# Patient Record
Sex: FEMALE | Race: BLACK OR AFRICAN AMERICAN | NOT HISPANIC OR LATINO | Employment: OTHER | URBAN - METROPOLITAN AREA
[De-identification: names, ages, dates, MRNs, and addresses within clinical notes are randomized per-mention and may not be internally consistent; named-entity substitution may affect disease eponyms.]

---

## 2020-11-18 ENCOUNTER — APPOINTMENT (OUTPATIENT)
Dept: GENERAL RADIOLOGY | Facility: HOSPITAL | Age: 85
End: 2020-11-18

## 2020-11-18 ENCOUNTER — HOSPITAL ENCOUNTER (INPATIENT)
Facility: HOSPITAL | Age: 85
LOS: 6 days | Discharge: NURSING, REHAB OR LONG-TERM CARE | End: 2020-11-24
Attending: EMERGENCY MEDICINE | Admitting: INTERNAL MEDICINE

## 2020-11-18 DIAGNOSIS — G93.40 ACUTE ENCEPHALOPATHY: ICD-10-CM

## 2020-11-18 DIAGNOSIS — E11.10 DIABETIC KETOACIDOSIS WITHOUT COMA ASSOCIATED WITH TYPE 2 DIABETES MELLITUS (HCC): Primary | ICD-10-CM

## 2020-11-18 DIAGNOSIS — R09.02 HYPOXIA: ICD-10-CM

## 2020-11-18 DIAGNOSIS — N17.9 ACUTE KIDNEY INJURY (HCC): ICD-10-CM

## 2020-11-18 DIAGNOSIS — U07.1 COVID-19 VIRUS INFECTION: ICD-10-CM

## 2020-11-18 DIAGNOSIS — E87.5 HYPERKALEMIA: ICD-10-CM

## 2020-11-18 LAB
ALBUMIN SERPL-MCNC: 4 G/DL (ref 3.5–5.2)
ALBUMIN/GLOB SERPL: 0.9 G/DL
ALP SERPL-CCNC: 85 U/L (ref 39–117)
ALT SERPL W P-5'-P-CCNC: 16 U/L (ref 1–33)
ANION GAP SERPL CALCULATED.3IONS-SCNC: 20.3 MMOL/L (ref 5–15)
ARTERIAL PATENCY WRIST A: POSITIVE
AST SERPL-CCNC: 15 U/L (ref 1–32)
ATMOSPHERIC PRESS: 763 MMHG
B PARAPERT DNA SPEC QL NAA+PROBE: NOT DETECTED
B PERT DNA SPEC QL NAA+PROBE: NOT DETECTED
B-OH-BUTYR SERPL-SCNC: 2.07 MMOL/L (ref 0.02–0.27)
BASE EXCESS BLDA CALC-SCNC: -8 MMOL/L (ref 0–2)
BASOPHILS # BLD AUTO: 0.02 10*3/MM3 (ref 0–0.2)
BASOPHILS NFR BLD AUTO: 0.2 % (ref 0–1.5)
BDY SITE: ABNORMAL
BILIRUB SERPL-MCNC: 0.5 MG/DL (ref 0–1.2)
BUN SERPL-MCNC: 83 MG/DL (ref 8–23)
BUN/CREAT SERPL: 50.6 (ref 7–25)
C PNEUM DNA NPH QL NAA+NON-PROBE: NOT DETECTED
CALCIUM SPEC-SCNC: 10.1 MG/DL (ref 8.6–10.5)
CHLORIDE SERPL-SCNC: 109 MMOL/L (ref 98–107)
CO2 SERPL-SCNC: 13.7 MMOL/L (ref 22–29)
CREAT SERPL-MCNC: 1.64 MG/DL (ref 0.57–1)
CRP SERPL-MCNC: 1.95 MG/DL (ref 0–0.5)
D DIMER PPP FEU-MCNC: 2.36 MCGFEU/ML (ref 0–0.49)
D-LACTATE SERPL-SCNC: 1.7 MMOL/L (ref 0.5–2)
D-LACTATE SERPL-SCNC: 2.6 MMOL/L (ref 0.5–2)
DEPRECATED RDW RBC AUTO: 45.3 FL (ref 37–54)
EOSINOPHIL # BLD AUTO: 0 10*3/MM3 (ref 0–0.4)
EOSINOPHIL NFR BLD AUTO: 0 % (ref 0.3–6.2)
ERYTHROCYTE [DISTWIDTH] IN BLOOD BY AUTOMATED COUNT: 13.4 % (ref 12.3–15.4)
FERRITIN SERPL-MCNC: 706 NG/ML (ref 13–150)
FLUAV SUBTYP SPEC NAA+PROBE: NOT DETECTED
FLUBV RNA ISLT QL NAA+PROBE: NOT DETECTED
GAS FLOW AIRWAY: 3 LPM
GFR SERPL CREATININE-BSD FRML MDRD: 36 ML/MIN/1.73
GLOBULIN UR ELPH-MCNC: 4.7 GM/DL
GLUCOSE BLDC GLUCOMTR-MCNC: 488 MG/DL (ref 70–130)
GLUCOSE BLDC GLUCOMTR-MCNC: >599 MG/DL (ref 70–130)
GLUCOSE SERPL-MCNC: 601 MG/DL (ref 65–99)
HADV DNA SPEC NAA+PROBE: NOT DETECTED
HBA1C MFR BLD: 7.4 % (ref 4.8–5.6)
HCO3 BLDA-SCNC: 14.8 MMOL/L (ref 22–28)
HCOV 229E RNA SPEC QL NAA+PROBE: NOT DETECTED
HCOV HKU1 RNA SPEC QL NAA+PROBE: NOT DETECTED
HCOV NL63 RNA SPEC QL NAA+PROBE: NOT DETECTED
HCOV OC43 RNA SPEC QL NAA+PROBE: NOT DETECTED
HCT VFR BLD AUTO: 42.1 % (ref 34–46.6)
HGB BLD-MCNC: 13.6 G/DL (ref 12–15.9)
HMPV RNA NPH QL NAA+NON-PROBE: NOT DETECTED
HPIV1 RNA SPEC QL NAA+PROBE: NOT DETECTED
HPIV2 RNA SPEC QL NAA+PROBE: NOT DETECTED
HPIV3 RNA NPH QL NAA+PROBE: NOT DETECTED
HPIV4 P GENE NPH QL NAA+PROBE: NOT DETECTED
IMM GRANULOCYTES # BLD AUTO: 0.07 10*3/MM3 (ref 0–0.05)
IMM GRANULOCYTES NFR BLD AUTO: 0.5 % (ref 0–0.5)
LACTATE HOLD SPECIMEN: NORMAL
LDH SERPL-CCNC: 217 U/L (ref 135–214)
LYMPHOCYTES # BLD AUTO: 1.18 10*3/MM3 (ref 0.7–3.1)
LYMPHOCYTES NFR BLD AUTO: 9.1 % (ref 19.6–45.3)
M PNEUMO IGG SER IA-ACNC: NOT DETECTED
MAGNESIUM SERPL-MCNC: 1.1 MG/DL (ref 1.6–2.4)
MCH RBC QN AUTO: 30.4 PG (ref 26.6–33)
MCHC RBC AUTO-ENTMCNC: 32.3 G/DL (ref 31.5–35.7)
MCV RBC AUTO: 94 FL (ref 79–97)
MODALITY: ABNORMAL
MONOCYTES # BLD AUTO: 1.39 10*3/MM3 (ref 0.1–0.9)
MONOCYTES NFR BLD AUTO: 10.7 % (ref 5–12)
NEUTROPHILS NFR BLD AUTO: 10.28 10*3/MM3 (ref 1.7–7)
NEUTROPHILS NFR BLD AUTO: 79.5 % (ref 42.7–76)
NRBC BLD AUTO-RTO: 0.1 /100 WBC (ref 0–0.2)
OSMOLALITY SERPL: 305 MOSM/KG (ref 280–301)
PCO2 BLDA: 23.7 MM HG (ref 35–45)
PH BLDA: 7.4 PH UNITS (ref 7.35–7.45)
PHOSPHATE SERPL-MCNC: 4.7 MG/DL (ref 2.5–4.5)
PLATELET # BLD AUTO: 610 10*3/MM3 (ref 140–450)
PMV BLD AUTO: 10.1 FL (ref 6–12)
PO2 BLDA: 79.7 MM HG (ref 80–100)
POTASSIUM SERPL-SCNC: 6.8 MMOL/L (ref 3.5–5.2)
PROCALCITONIN SERPL-MCNC: 0.11 NG/ML (ref 0–0.25)
PROT SERPL-MCNC: 8.7 G/DL (ref 6–8.5)
RBC # BLD AUTO: 4.48 10*6/MM3 (ref 3.77–5.28)
RHINOVIRUS RNA SPEC NAA+PROBE: NOT DETECTED
RSV RNA NPH QL NAA+NON-PROBE: NOT DETECTED
SAO2 % BLDCOA: 96.1 % (ref 92–99)
SARS-COV-2 RNA NPH QL NAA+NON-PROBE: DETECTED
SET MECH RESP RATE: 28
SODIUM SERPL-SCNC: 143 MMOL/L (ref 136–145)
TROPONIN T SERPL-MCNC: 0.17 NG/ML (ref 0–0.03)
WBC # BLD AUTO: 12.94 10*3/MM3 (ref 3.4–10.8)

## 2020-11-18 PROCEDURE — 82962 GLUCOSE BLOOD TEST: CPT

## 2020-11-18 PROCEDURE — 0202U NFCT DS 22 TRGT SARS-COV-2: CPT | Performed by: EMERGENCY MEDICINE

## 2020-11-18 PROCEDURE — 99285 EMERGENCY DEPT VISIT HI MDM: CPT

## 2020-11-18 PROCEDURE — 80053 COMPREHEN METABOLIC PANEL: CPT | Performed by: EMERGENCY MEDICINE

## 2020-11-18 PROCEDURE — 85379 FIBRIN DEGRADATION QUANT: CPT | Performed by: EMERGENCY MEDICINE

## 2020-11-18 PROCEDURE — 25010000002 ENOXAPARIN PER 10 MG: Performed by: INTERNAL MEDICINE

## 2020-11-18 PROCEDURE — 82010 KETONE BODYS QUAN: CPT | Performed by: EMERGENCY MEDICINE

## 2020-11-18 PROCEDURE — 83930 ASSAY OF BLOOD OSMOLALITY: CPT | Performed by: EMERGENCY MEDICINE

## 2020-11-18 PROCEDURE — 84484 ASSAY OF TROPONIN QUANT: CPT | Performed by: EMERGENCY MEDICINE

## 2020-11-18 PROCEDURE — 25010000002 CALCIUM GLUCONATE PER 10 ML: Performed by: EMERGENCY MEDICINE

## 2020-11-18 PROCEDURE — 85025 COMPLETE CBC W/AUTO DIFF WBC: CPT | Performed by: EMERGENCY MEDICINE

## 2020-11-18 PROCEDURE — 82803 BLOOD GASES ANY COMBINATION: CPT

## 2020-11-18 PROCEDURE — 84145 PROCALCITONIN (PCT): CPT | Performed by: EMERGENCY MEDICINE

## 2020-11-18 PROCEDURE — 71045 X-RAY EXAM CHEST 1 VIEW: CPT

## 2020-11-18 PROCEDURE — 93005 ELECTROCARDIOGRAM TRACING: CPT | Performed by: EMERGENCY MEDICINE

## 2020-11-18 PROCEDURE — 25010000002 MAGNESIUM SULFATE 2 GM/50ML SOLUTION: Performed by: INTERNAL MEDICINE

## 2020-11-18 PROCEDURE — 82728 ASSAY OF FERRITIN: CPT | Performed by: EMERGENCY MEDICINE

## 2020-11-18 PROCEDURE — 84100 ASSAY OF PHOSPHORUS: CPT | Performed by: EMERGENCY MEDICINE

## 2020-11-18 PROCEDURE — 36600 WITHDRAWAL OF ARTERIAL BLOOD: CPT

## 2020-11-18 PROCEDURE — 83735 ASSAY OF MAGNESIUM: CPT | Performed by: EMERGENCY MEDICINE

## 2020-11-18 PROCEDURE — 83605 ASSAY OF LACTIC ACID: CPT | Performed by: EMERGENCY MEDICINE

## 2020-11-18 PROCEDURE — 25010000002 CEFTRIAXONE PER 250 MG: Performed by: EMERGENCY MEDICINE

## 2020-11-18 PROCEDURE — 25010000003 INSULIN REGULAR HUMAN PER 5 UNITS: Performed by: EMERGENCY MEDICINE

## 2020-11-18 PROCEDURE — 93010 ELECTROCARDIOGRAM REPORT: CPT | Performed by: INTERNAL MEDICINE

## 2020-11-18 PROCEDURE — 86140 C-REACTIVE PROTEIN: CPT | Performed by: EMERGENCY MEDICINE

## 2020-11-18 PROCEDURE — 83615 LACTATE (LD) (LDH) ENZYME: CPT | Performed by: EMERGENCY MEDICINE

## 2020-11-18 PROCEDURE — 83036 HEMOGLOBIN GLYCOSYLATED A1C: CPT | Performed by: EMERGENCY MEDICINE

## 2020-11-18 RX ORDER — SODIUM CHLORIDE 450 MG/100ML
250 INJECTION, SOLUTION INTRAVENOUS CONTINUOUS PRN
Status: DISCONTINUED | OUTPATIENT
Start: 2020-11-18 | End: 2020-11-20

## 2020-11-18 RX ORDER — CEFTRIAXONE SODIUM 1 G/50ML
1 INJECTION, SOLUTION INTRAVENOUS ONCE
Status: COMPLETED | OUTPATIENT
Start: 2020-11-18 | End: 2020-11-18

## 2020-11-18 RX ORDER — PRAVASTATIN SODIUM 40 MG
40 TABLET ORAL DAILY
COMMUNITY
End: 2022-01-01 | Stop reason: HOSPADM

## 2020-11-18 RX ORDER — VALSARTAN AND HYDROCHLOROTHIAZIDE 320; 12.5 MG/1; MG/1
1 TABLET, FILM COATED ORAL DAILY
COMMUNITY
End: 2022-01-01 | Stop reason: HOSPADM

## 2020-11-18 RX ORDER — SODIUM CHLORIDE 0.9 % (FLUSH) 0.9 %
10 SYRINGE (ML) INJECTION AS NEEDED
Status: DISCONTINUED | OUTPATIENT
Start: 2020-11-18 | End: 2020-11-24 | Stop reason: HOSPADM

## 2020-11-18 RX ORDER — POTASSIUM CHLORIDE 750 MG/1
40 TABLET, FILM COATED, EXTENDED RELEASE ORAL AS NEEDED
Status: DISCONTINUED | OUTPATIENT
Start: 2020-11-18 | End: 2020-11-24 | Stop reason: HOSPADM

## 2020-11-18 RX ORDER — SODIUM CHLORIDE 0.9 % (FLUSH) 0.9 %
10 SYRINGE (ML) INJECTION ONCE AS NEEDED
Status: DISCONTINUED | OUTPATIENT
Start: 2020-11-18 | End: 2020-11-24 | Stop reason: HOSPADM

## 2020-11-18 RX ORDER — SODIUM CHLORIDE AND POTASSIUM CHLORIDE 300; 900 MG/100ML; MG/100ML
250 INJECTION, SOLUTION INTRAVENOUS CONTINUOUS PRN
Status: DISCONTINUED | OUTPATIENT
Start: 2020-11-18 | End: 2020-11-20

## 2020-11-18 RX ORDER — DEXTROSE AND SODIUM CHLORIDE 5; .45 G/100ML; G/100ML
150 INJECTION, SOLUTION INTRAVENOUS CONTINUOUS PRN
Status: DISCONTINUED | OUTPATIENT
Start: 2020-11-18 | End: 2020-11-20

## 2020-11-18 RX ORDER — MAGNESIUM SULFATE HEPTAHYDRATE 40 MG/ML
2 INJECTION, SOLUTION INTRAVENOUS AS NEEDED
Status: DISCONTINUED | OUTPATIENT
Start: 2020-11-18 | End: 2020-11-24 | Stop reason: HOSPADM

## 2020-11-18 RX ORDER — DEXTROSE MONOHYDRATE 25 G/50ML
50 INJECTION, SOLUTION INTRAVENOUS ONCE
Status: COMPLETED | OUTPATIENT
Start: 2020-11-18 | End: 2020-11-18

## 2020-11-18 RX ORDER — DEXTROSE, SODIUM CHLORIDE, AND POTASSIUM CHLORIDE 5; .45; .15 G/100ML; G/100ML; G/100ML
150 INJECTION INTRAVENOUS CONTINUOUS PRN
Status: DISCONTINUED | OUTPATIENT
Start: 2020-11-18 | End: 2020-11-20

## 2020-11-18 RX ORDER — GLIPIZIDE 5 MG/1
5 TABLET ORAL DAILY
COMMUNITY
End: 2020-11-24 | Stop reason: HOSPADM

## 2020-11-18 RX ORDER — GABAPENTIN 100 MG/1
100 CAPSULE ORAL NIGHTLY
COMMUNITY

## 2020-11-18 RX ORDER — DEXTROSE, SODIUM CHLORIDE, AND POTASSIUM CHLORIDE 5; .9; .15 G/100ML; G/100ML; G/100ML
150 INJECTION INTRAVENOUS CONTINUOUS PRN
Status: DISCONTINUED | OUTPATIENT
Start: 2020-11-18 | End: 2020-11-20

## 2020-11-18 RX ORDER — SPIRONOLACTONE 25 MG/1
25 TABLET ORAL DAILY
COMMUNITY
End: 2022-01-01 | Stop reason: HOSPADM

## 2020-11-18 RX ORDER — IPRATROPIUM BROMIDE AND ALBUTEROL SULFATE 2.5; .5 MG/3ML; MG/3ML
3 SOLUTION RESPIRATORY (INHALATION)
Status: DISCONTINUED | OUTPATIENT
Start: 2020-11-18 | End: 2020-11-24 | Stop reason: HOSPADM

## 2020-11-18 RX ORDER — ACETAMINOPHEN 650 MG/1
650 SUPPOSITORY RECTAL EVERY 4 HOURS PRN
Status: DISCONTINUED | OUTPATIENT
Start: 2020-11-18 | End: 2020-11-24 | Stop reason: HOSPADM

## 2020-11-18 RX ORDER — CHOLECALCIFEROL (VITAMIN D3) 125 MCG
500 CAPSULE ORAL DAILY
COMMUNITY
End: 2022-01-01 | Stop reason: HOSPADM

## 2020-11-18 RX ORDER — CLONIDINE HYDROCHLORIDE 0.1 MG/1
0.1 TABLET ORAL 2 TIMES DAILY
COMMUNITY
End: 2022-01-01 | Stop reason: HOSPADM

## 2020-11-18 RX ORDER — POTASSIUM CHLORIDE 7.45 MG/ML
10 INJECTION INTRAVENOUS
Status: DISCONTINUED | OUTPATIENT
Start: 2020-11-18 | End: 2020-11-24 | Stop reason: HOSPADM

## 2020-11-18 RX ORDER — MULTIPLE VITAMINS W/ MINERALS TAB 9MG-400MCG
1 TAB ORAL DAILY
COMMUNITY
End: 2022-01-01 | Stop reason: HOSPADM

## 2020-11-18 RX ORDER — DEXAMETHASONE 6 MG/1
6 TABLET ORAL
COMMUNITY
End: 2020-11-24 | Stop reason: HOSPADM

## 2020-11-18 RX ORDER — POTASSIUM CHLORIDE, DEXTROSE MONOHYDRATE AND SODIUM CHLORIDE 300; 5; 900 MG/100ML; G/100ML; MG/100ML
150 INJECTION, SOLUTION INTRAVENOUS CONTINUOUS PRN
Status: DISCONTINUED | OUTPATIENT
Start: 2020-11-18 | End: 2020-11-20

## 2020-11-18 RX ORDER — SODIUM CHLORIDE 0.9 % (FLUSH) 0.9 %
10 SYRINGE (ML) INJECTION EVERY 12 HOURS SCHEDULED
Status: DISCONTINUED | OUTPATIENT
Start: 2020-11-18 | End: 2020-11-24 | Stop reason: HOSPADM

## 2020-11-18 RX ORDER — SODIUM CHLORIDE AND POTASSIUM CHLORIDE 150; 900 MG/100ML; MG/100ML
250 INJECTION, SOLUTION INTRAVENOUS CONTINUOUS PRN
Status: DISCONTINUED | OUTPATIENT
Start: 2020-11-18 | End: 2020-11-20

## 2020-11-18 RX ORDER — METOPROLOL SUCCINATE 100 MG/1
100 TABLET, EXTENDED RELEASE ORAL DAILY
COMMUNITY
End: 2022-01-01 | Stop reason: HOSPADM

## 2020-11-18 RX ORDER — SODIUM CHLORIDE 0.9 % (FLUSH) 0.9 %
3 SYRINGE (ML) INJECTION EVERY 12 HOURS SCHEDULED
Status: DISCONTINUED | OUTPATIENT
Start: 2020-11-18 | End: 2020-11-24 | Stop reason: HOSPADM

## 2020-11-18 RX ORDER — AZITHROMYCIN 250 MG/1
250 TABLET, FILM COATED ORAL DAILY
COMMUNITY
End: 2022-01-01 | Stop reason: HOSPADM

## 2020-11-18 RX ORDER — MAGNESIUM SULFATE HEPTAHYDRATE 40 MG/ML
4 INJECTION, SOLUTION INTRAVENOUS AS NEEDED
Status: DISCONTINUED | OUTPATIENT
Start: 2020-11-18 | End: 2020-11-24 | Stop reason: HOSPADM

## 2020-11-18 RX ORDER — ONDANSETRON 2 MG/ML
4 INJECTION INTRAMUSCULAR; INTRAVENOUS EVERY 6 HOURS PRN
Status: DISCONTINUED | OUTPATIENT
Start: 2020-11-18 | End: 2020-11-24 | Stop reason: HOSPADM

## 2020-11-18 RX ORDER — DILTIAZEM HYDROCHLORIDE 240 MG/1
300 CAPSULE, COATED, EXTENDED RELEASE ORAL DAILY
COMMUNITY
End: 2022-01-01 | Stop reason: HOSPADM

## 2020-11-18 RX ORDER — DEXTROSE AND SODIUM CHLORIDE 5; .9 G/100ML; G/100ML
150 INJECTION, SOLUTION INTRAVENOUS CONTINUOUS PRN
Status: DISCONTINUED | OUTPATIENT
Start: 2020-11-18 | End: 2020-11-20

## 2020-11-18 RX ORDER — POTASSIUM CHLORIDE 1.5 G/1.77G
40 POWDER, FOR SOLUTION ORAL AS NEEDED
Status: DISCONTINUED | OUTPATIENT
Start: 2020-11-18 | End: 2020-11-24 | Stop reason: HOSPADM

## 2020-11-18 RX ORDER — SODIUM CHLORIDE 9 MG/ML
10 INJECTION, SOLUTION INTRAVENOUS CONTINUOUS PRN
Status: DISCONTINUED | OUTPATIENT
Start: 2020-11-18 | End: 2020-11-24 | Stop reason: HOSPADM

## 2020-11-18 RX ORDER — ACETAMINOPHEN 500 MG
500 TABLET ORAL EVERY 6 HOURS PRN
COMMUNITY

## 2020-11-18 RX ORDER — CETIRIZINE HYDROCHLORIDE 10 MG/1
10 TABLET ORAL DAILY
COMMUNITY
End: 2022-01-01 | Stop reason: HOSPADM

## 2020-11-18 RX ORDER — POLYETHYLENE GLYCOL 3350 17 G/17G
17 POWDER, FOR SOLUTION ORAL DAILY PRN
COMMUNITY
End: 2022-01-01 | Stop reason: HOSPADM

## 2020-11-18 RX ORDER — ONDANSETRON 4 MG/1
4 TABLET, FILM COATED ORAL EVERY 6 HOURS PRN
Status: DISCONTINUED | OUTPATIENT
Start: 2020-11-18 | End: 2020-11-24 | Stop reason: HOSPADM

## 2020-11-18 RX ORDER — HYDROCODONE BITARTRATE AND ACETAMINOPHEN 5; 325 MG/1; MG/1
1 TABLET ORAL EVERY 12 HOURS PRN
COMMUNITY
End: 2020-11-24 | Stop reason: HOSPADM

## 2020-11-18 RX ORDER — ASPIRIN 81 MG/1
81 TABLET ORAL DAILY
COMMUNITY

## 2020-11-18 RX ORDER — DEXTROSE, SODIUM CHLORIDE, AND POTASSIUM CHLORIDE 5; .45; .3 G/100ML; G/100ML; G/100ML
150 INJECTION INTRAVENOUS CONTINUOUS PRN
Status: DISCONTINUED | OUTPATIENT
Start: 2020-11-18 | End: 2020-11-20

## 2020-11-18 RX ORDER — SODIUM CHLORIDE 9 MG/ML
250 INJECTION, SOLUTION INTRAVENOUS CONTINUOUS PRN
Status: DISCONTINUED | OUTPATIENT
Start: 2020-11-18 | End: 2020-11-20

## 2020-11-18 RX ORDER — ACETAMINOPHEN 325 MG/1
650 TABLET ORAL EVERY 4 HOURS PRN
Status: DISCONTINUED | OUTPATIENT
Start: 2020-11-18 | End: 2020-11-24 | Stop reason: HOSPADM

## 2020-11-18 RX ORDER — DEXTROSE MONOHYDRATE 25 G/50ML
12.5 INJECTION, SOLUTION INTRAVENOUS AS NEEDED
Status: DISCONTINUED | OUTPATIENT
Start: 2020-11-18 | End: 2020-11-24 | Stop reason: HOSPADM

## 2020-11-18 RX ORDER — LANOLIN ALCOHOL/MO/W.PET/CERES
3 CREAM (GRAM) TOPICAL NIGHTLY
COMMUNITY

## 2020-11-18 RX ORDER — SODIUM CHLORIDE AND POTASSIUM CHLORIDE 150; 450 MG/100ML; MG/100ML
250 INJECTION, SOLUTION INTRAVENOUS CONTINUOUS PRN
Status: DISCONTINUED | OUTPATIENT
Start: 2020-11-18 | End: 2020-11-20

## 2020-11-18 RX ADMIN — SODIUM CHLORIDE 2000 ML: 9 INJECTION, SOLUTION INTRAVENOUS at 23:12

## 2020-11-18 RX ADMIN — SODIUM BICARBONATE 50 MEQ: 84 INJECTION INTRAVENOUS at 20:31

## 2020-11-18 RX ADMIN — CALCIUM GLUCONATE 1 G: 98 INJECTION, SOLUTION INTRAVENOUS at 20:32

## 2020-11-18 RX ADMIN — ENOXAPARIN SODIUM 40 MG: 40 INJECTION SUBCUTANEOUS at 23:07

## 2020-11-18 RX ADMIN — SODIUM CHLORIDE 7 UNITS/HR: 9 INJECTION, SOLUTION INTRAVENOUS at 20:35

## 2020-11-18 RX ADMIN — DEXTROSE MONOHYDRATE 50 ML: 500 INJECTION PARENTERAL at 20:19

## 2020-11-18 RX ADMIN — CEFTRIAXONE SODIUM 1 G: 1 INJECTION, SOLUTION INTRAVENOUS at 19:49

## 2020-11-18 RX ADMIN — SODIUM CHLORIDE 250 ML/HR: 4.5 INJECTION, SOLUTION INTRAVENOUS at 22:26

## 2020-11-18 RX ADMIN — MAGNESIUM SULFATE HEPTAHYDRATE 2 G: 40 INJECTION, SOLUTION INTRAVENOUS at 23:55

## 2020-11-18 NOTE — ED NOTES
Nursing home report pt is Covid positive and cannot keep her SaO2 above 85%. Pt is normally disoriented, but is more confused than normal today.    Pt arrived to ER wearing a face mask.     Milli Rossi RN  11/18/20 8914

## 2020-11-18 NOTE — ED PROVIDER NOTES
EMERGENCY DEPARTMENT ENCOUNTER    Room Number:  16/16  Date of encounter:  11/18/2020  PCP: Rigoberto Law MD  Historian: EMS      HPI:  Chief Complaint: Disorientation, hypoxia  A complete HPI/ROS/PMH/PSH/SH/FH are unobtainable due to: Patient condition    Context: Gsiela Gandara is a 88 y.o. female who presents to the ED via [facility] from [facility] with reports of hypoxia and disorientation. Patient reportedly recently tested positive for Covid, however, patient unable to keep oxygen saturations above 85% despite supplemental O2. Patient with baseline disorientation but more confused than normal per facility. There is a DNR listed on her accompanying paperwork.       MEDICAL RECORD REVIEW    Accompanying paperwork from facility shows a DNR    PAST MEDICAL HISTORY  Active Ambulatory Problems     Diagnosis Date Noted   • No Active Ambulatory Problems     Resolved Ambulatory Problems     Diagnosis Date Noted   • No Resolved Ambulatory Problems     No Additional Past Medical History         PAST SURGICAL HISTORY  No past surgical history on file.      FAMILY HISTORY  No family history on file.      SOCIAL HISTORY  Social History     Socioeconomic History   • Marital status:      Spouse name: Not on file   • Number of children: Not on file   • Years of education: Not on file   • Highest education level: Not on file         ALLERGIES  Patient has no known allergies.        REVIEW OF SYSTEMS  Review of Systems     Unable to obtain further history due to patient condition      PHYSICAL EXAM    I have reviewed the triage vital signs and nursing notes.    ED Triage Vitals [11/18/20 1658]   Temp Heart Rate Resp BP SpO2   98.9 °F (37.2 °C) 85 28 113/85 (!) 85 %      Temp src Heart Rate Source Patient Position BP Location FiO2 (%)   -- -- -- -- --       Physical Exam  General: Awake, ill-appearing  HEENT: Mucous membranes dry, atraumatic, normocephalic, EOMI  Neck: Full ROM  Pulm:  Symmetric chest rise, tachypnea, no overt wheezes/rales/rhonchi with diminished breath sounds in the bases bilaterally  Cardiovascular: Regular rate and rhythm, normal S1/S2, intact distal pulses  GI: Soft, nontender, nondistended, no rebound, no guarding, bowel sounds present  MSK: Full ROM, no deformity  Skin: Warm, dry  Neuro: Awake, GCS 11, moving all extremities  Psych: Mildly agitated, uncooperative      Gown, surgical mask, protective eye goggles, and gloves used during this encounter. Patient in surgical mask.      LAB RESULTS  Recent Results (from the past 24 hour(s))   D-dimer, Quantitative    Collection Time: 11/18/20  5:38 PM    Specimen: Blood   Result Value Ref Range    D-Dimer, Quantitative 2.36 (H) 0.00 - 0.49 MCGFEU/mL   C-reactive Protein    Collection Time: 11/18/20  5:38 PM    Specimen: Blood   Result Value Ref Range    C-Reactive Protein 1.95 (H) 0.00 - 0.50 mg/dL   Lactic Acid, Plasma    Collection Time: 11/18/20  5:38 PM    Specimen: Blood   Result Value Ref Range    Lactate 2.6 (C) 0.5 - 2.0 mmol/L   CBC Auto Differential    Collection Time: 11/18/20  5:38 PM    Specimen: Blood   Result Value Ref Range    WBC 12.94 (H) 3.40 - 10.80 10*3/mm3    RBC 4.48 3.77 - 5.28 10*6/mm3    Hemoglobin 13.6 12.0 - 15.9 g/dL    Hematocrit 42.1 34.0 - 46.6 %    MCV 94.0 79.0 - 97.0 fL    MCH 30.4 26.6 - 33.0 pg    MCHC 32.3 31.5 - 35.7 g/dL    RDW 13.4 12.3 - 15.4 %    RDW-SD 45.3 37.0 - 54.0 fl    MPV 10.1 6.0 - 12.0 fL    Platelets 610 (H) 140 - 450 10*3/mm3    Neutrophil % 79.5 (H) 42.7 - 76.0 %    Lymphocyte % 9.1 (L) 19.6 - 45.3 %    Monocyte % 10.7 5.0 - 12.0 %    Eosinophil % 0.0 (L) 0.3 - 6.2 %    Basophil % 0.2 0.0 - 1.5 %    Immature Grans % 0.5 0.0 - 0.5 %    Neutrophils, Absolute 10.28 (H) 1.70 - 7.00 10*3/mm3    Lymphocytes, Absolute 1.18 0.70 - 3.10 10*3/mm3    Monocytes, Absolute 1.39 (H) 0.10 - 0.90 10*3/mm3    Eosinophils, Absolute 0.00 0.00 - 0.40 10*3/mm3    Basophils, Absolute 0.02 0.00 -  0.20 10*3/mm3    Immature Grans, Absolute 0.07 (H) 0.00 - 0.05 10*3/mm3    nRBC 0.1 0.0 - 0.2 /100 WBC   Lactic Acid, Reflex Timer (This will reflex a repeat order 3-3:15 hours after ordered.)    Collection Time: 11/18/20  5:38 PM    Specimen: Blood   Result Value Ref Range    Hold Tube Hold for add-ons.    Hemoglobin A1c    Collection Time: 11/18/20  5:38 PM    Specimen: Blood   Result Value Ref Range    Hemoglobin A1C 7.40 (H) 4.80 - 5.60 %   ECG 12 Lead    Collection Time: 11/18/20  5:42 PM   Result Value Ref Range    QT Interval 345 ms   Respiratory Panel PCR w/COVID-19(SARS-CoV-2) [facility] In-House, NP Swab in UTM/VTM, 3-4 HR TAT - Swab, Nasopharynx    Collection Time: 11/18/20  5:43 PM    Specimen: Nasopharynx; Swab   Result Value Ref Range    ADENOVIRUS, PCR Not Detected Not Detected    Coronavirus 229E Not Detected Not Detected    Coronavirus HKU1 Not Detected Not Detected    Coronavirus NL63 Not Detected Not Detected    Coronavirus OC43 Not Detected Not Detected    COVID19 Detected (C) Not Detected - Ref. Range    Human Metapneumovirus Not Detected Not Detected    Human Rhinovirus/Enterovirus Not Detected Not Detected    Influenza A PCR Not Detected Not Detected    Influenza B PCR Not Detected Not Detected    Parainfluenza Virus 1 Not Detected Not Detected    Parainfluenza Virus 2 Not Detected Not Detected    Parainfluenza Virus 3 Not Detected Not Detected    Parainfluenza Virus 4 Not Detected Not Detected    RSV, PCR Not Detected Not Detected    Bordetella pertussis pcr Not Detected Not Detected    Bordetella parapertussis PCR Not Detected Not Detected    Chlamydophila pneumoniae PCR Not Detected Not Detected    Mycoplasma pneumo by PCR Not Detected Not Detected   Blood Gas, Arterial -    Collection Time: 11/18/20  6:13 PM    Specimen: Arterial Blood   Result Value Ref Range    Site Arterial: left radial     Rigo's Test Positive     pH, Arterial 7.403 7.350 - 7.450 pH units    pCO2,  Arterial 23.7 (L) 35.0 - 45.0 mm Hg    pO2, Arterial 79.7 (L) 80.0 - 100.0 mm Hg    HCO3, Arterial 14.8 (L) 22.0 - 28.0 mmol/L    Base Excess, Arterial -8.0 (L) 0.0 - 2.0 mmol/L    O2 Saturation Calculated 96.1 92.0 - 99.0 %    Barometric Pressure for Blood Gas 763.0 mmHg    Modality Cannula     Flow Rate 3 lpm    Set Mech Resp Rate 28    Comprehensive Metabolic Panel    Collection Time: 11/18/20  6:44 PM    Specimen: Blood   Result Value Ref Range    Glucose 601 (C) 65 - 99 mg/dL    BUN 83 (H) 8 - 23 mg/dL    Creatinine 1.64 (H) 0.57 - 1.00 mg/dL    Sodium 143 136 - 145 mmol/L    Potassium 6.8 (C) 3.5 - 5.2 mmol/L    Chloride 109 (H) 98 - 107 mmol/L    CO2 13.7 (L) 22.0 - 29.0 mmol/L    Calcium 10.1 8.6 - 10.5 mg/dL    Total Protein 8.7 (H) 6.0 - 8.5 g/dL    Albumin 4.00 3.50 - 5.20 g/dL    ALT (SGPT) 16 1 - 33 U/L    AST (SGOT) 15 1 - 32 U/L    Alkaline Phosphatase 85 39 - 117 U/L    Total Bilirubin 0.5 0.0 - 1.2 mg/dL    eGFR  African Amer 36 (L) >60 mL/min/1.73    Globulin 4.7 gm/dL    A/G Ratio 0.9 g/dL    BUN/Creatinine Ratio 50.6 (H) 7.0 - 25.0    Anion Gap 20.3 (H) 5.0 - 15.0 mmol/L   Lactate Dehydrogenase    Collection Time: 11/18/20  6:44 PM    Specimen: Blood   Result Value Ref Range     (H) 135 - 214 U/L   Procalcitonin    Collection Time: 11/18/20  6:44 PM    Specimen: Blood   Result Value Ref Range    Procalcitonin 0.11 0.00 - 0.25 ng/mL   Ferritin    Collection Time: 11/18/20  6:44 PM    Specimen: Blood   Result Value Ref Range    Ferritin 706.00 (H) 13.00 - 150.00 ng/mL   Phosphorus    Collection Time: 11/18/20  6:44 PM    Specimen: Blood   Result Value Ref Range    Phosphorus 4.7 (H) 2.5 - 4.5 mg/dL   Troponin    Collection Time: 11/18/20  6:44 PM    Specimen: Blood   Result Value Ref Range    Troponin T 0.173 (C) 0.000 - 0.030 ng/mL   Beta Hydroxybutyrate Quantitative    Collection Time: 11/18/20  6:44 PM    Specimen: Blood   Result Value Ref Range    Beta-Hydroxybutyrate Quant 2.066 (H)  0.020 - 0.270 mmol/L       Ordered the above labs and independently reviewed the results.        RADIOLOGY  Xr Chest Ap    Result Date: 11/18/2020  XR CHEST AP-  Clinical: Hypoxia, Covid evaluation 80-year-old female  FINDINGS: The patient is leaning towards the left and there appears to be some increased left basilar opacity which probably represents atelectasis. No effusion or edema or consolidation is demonstrated and the right lung is clear. Heart size within normal limits. There is atherosclerotic calcification of the aorta. The remainder of examination is unremarkable.  This report was finalized on 11/18/2020 6:55 PM by Dr. Nehemiah Boone M.D.        I ordered the above noted radiological studies. Reviewed by me.  See dictation for official radiology interpretation.      PROCEDURES    Critical Care  Performed by: Delonte Martínez MD  Authorized by: Karthik Hoffman MD     Critical care provider statement:     Critical care time (minutes):  37    Critical care time was exclusive of:  Separately billable procedures and treating other patients    Critical care was necessary to treat or prevent imminent or life-threatening deterioration of the following conditions:  CNS failure or compromise, metabolic crisis, endocrine crisis and renal failure    Critical care was time spent personally by me on the following activities:  Development of treatment plan with patient or surrogate, discussions with consultants, evaluation of patient's response to treatment, examination of patient, obtaining history from patient or surrogate, ordering and performing treatments and interventions, ordering and review of laboratory studies, ordering and review of radiographic studies, pulse oximetry, re-evaluation of patient's condition and review of old charts      EKG    EKG Time: 1742  Rhythm/Rate: Sinus rhythm with a rate of 99  Left axis deviation with nonspecific IVCD  Artifact but no evidence of any acute STEMI  No STEMI      Interpreted Contemporaneously by me, independently viewed  No prior for comparison      MEDICATIONS GIVEN IN ER    Medications   sodium chloride 0.9 % flush 3 mL (has no administration in time range)   sodium chloride 0.9 % flush 10 mL (has no administration in time range)   sodium chloride 0.9 % infusion (has no administration in time range)   sodium chloride 0.9 % with KCl 20 mEq/L infusion (has no administration in time range)   sodium chloride 0.9 % with KCl 40 mEq/L infusion (has no administration in time range)   dextrose 5 % and sodium chloride 0.9 % infusion (has no administration in time range)   dextrose 5 % and sodium chloride 0.9 % with KCl 40 mEq/L infusion (has no administration in time range)   dextrose 5 % and sodium chloride 0.9 % with KCl 20 mEq/L infusion (has no administration in time range)   sodium chloride 0.45 % infusion (has no administration in time range)   sodium chloride 0.45 % with KCl 20 mEq/L infusion (has no administration in time range)   sodium chloride 0.45 % 1,000 mL with potassium chloride 40 mEq infusion (has no administration in time range)   dextrose 5 % and sodium chloride 0.45 % infusion (has no administration in time range)   dextrose 5 % and sodium chloride 0.45 % with KCl 20 mEq/L infusion (has no administration in time range)   dextrose 5 % and sodium chloride 0.45 % with KCl 40 mEq/L infusion (has no administration in time range)   insulin regular (HumuLIN R,NovoLIN R) 100 Units in sodium chloride 0.9 % 100 mL (1 Units/mL) infusion (7 Units/hr Intravenous Restarted 11/18/20 2101)   dextrose (D50W) 25 g/ 50mL Intravenous Solution 12.5 g (has no administration in time range)   sodium chloride 0.9 % flush 10 mL (has no administration in time range)   sodium chloride 0.9 % infusion (has no administration in time range)   sodium chloride 0.9 % flush 10 mL (has no administration in time range)   sodium chloride 0.9 % flush 10 mL (has no administration in time range)    ipratropium-albuterol (DUO-NEB) nebulizer solution 3 mL (has no administration in time range)   enoxaparin (LOVENOX) syringe 40 mg (has no administration in time range)   acetaminophen (TYLENOL) tablet 650 mg (has no administration in time range)     Or   acetaminophen (TYLENOL) suppository 650 mg (has no administration in time range)   potassium chloride (K-DUR,KLOR-CON) ER tablet 40 mEq (has no administration in time range)     Or   potassium chloride (KLOR-CON) packet 40 mEq (has no administration in time range)     Or   potassium chloride 10 mEq in 100 mL IVPB (has no administration in time range)   Magnesium Sulfate 2 gram Bolus, followed by 8 gram infusion (total Mg dose 10 grams)- Mg less than or equal to 1mg/dL (has no administration in time range)     Or   Magnesium Sulfate 2 gram / 50mL Infusion (GIVE X 3 BAGS TO EQUAL 6GM TOTAL DOSE) - Mg 1.1 - 1.5 mg/dl (has no administration in time range)     Or   Magnesium Sulfate 4 gram infusion- Mg 1.6-1.9 mg/dL (has no administration in time range)   potassium phosphate 45 mmol in sodium chloride 0.9 % 500 mL infusion (has no administration in time range)     Or   potassium phosphate 30 mmol in sodium chloride 0.9 % 250 mL infusion (has no administration in time range)     Or   potassium phosphate 15 mmol in sodium chloride 0.9 % 100 mL infusion (has no administration in time range)     Or   sodium phosphates 40 mmol in sodium chloride 0.9 % 500 mL IVPB (has no administration in time range)     Or   sodium phosphates 20 mmol in sodium chloride 0.9 % 250 mL IVPB (has no administration in time range)   calcium gluconate 1 g/100 mL (10 mg/mL) NS IVPB (VTB) (has no administration in time range)     And   calcium gluconate 6 g in sodium chloride 0.9 % 500 mL IVPB (has no administration in time range)   ondansetron (ZOFRAN) tablet 4 mg (has no administration in time range)     Or   ondansetron (ZOFRAN) injection 4 mg (has no administration in time range)   cefTRIAXone  (ROCEPHIN) IVPB 1 g (0 g Intravenous Stopped 11/18/20 2031)   dextrose (D50W) 25 g/ 50mL Intravenous Solution 50 mL (50 mL Intravenous Given 11/18/20 2019)   sodium bicarbonate injection 8.4% 50 mEq (50 mEq Intravenous Given 11/18/20 2031)   calcium gluconate 1 g/100 mL (10 mg/mL) NS IVPB (VTB) (0 g Intravenous Stopped 11/18/20 2138)         PROGRESS, DATA ANALYSIS, CONSULTS, AND MEDICAL DECISION MAKING    All labs have been independently reviewed by me.  All radiology studies have been reviewed by me and discussed with radiologist dictating the report.   EKG's independently viewed and interpreted by me.  Discussion below represents my analysis of pertinent findings related to patient's condition, differential diagnosis, treatment plan and final disposition.    Initial concerns for decreasing mental status and hypoxia due to recent Covid diagnosis, however, will also evaluate for any other sources of renal failure, UTI, electrolyte abnormalities, cardiac issues, among others.    Troponin came back elevated today, defect this is related to acute kidney injury and critical illness, do not feel this represents NSTEMI.    ED Course as of Nov 18 2209   Wed Nov 18, 2020   1715 Nurse was able to confirm with facility that she is a DNR/DNI, recently started on dexamethasone. Conversational at baseline with some mild dementia. Facility reported left lower lobe infiltrate on chest x-ray on the 14th.    [DC]   1942 I spoke with the patient's daughter, she has been updated on her findings today and evidence of DKA in addition to Covid.  She does want aggressive measures towards the DKA, she does understand that this will land her in the ICU tonight.  The DKA order set has been initiated with treatments for the hyperkalemia as well.  She will remain a DNR/DNI.    [DC]   1947 Will start with initial 1L bolus of IV fluids, COVID 19 with hypoxia complicating factors for fluids, may need to limit/slow down fluid administration to  prevent worsening respiratory status.    [DC]   1948 Discussed with Dr. Karthik Hoffman, ICU, discussed patient's clinical course and findings today, treatment modalities, and need for ICU stay.    [DC]      ED Course User Index  [DC] Delonte Martínez MD       AS OF 22:09 EST VITALS:    BP - 146/91  HR - 100  TEMP - 98.6 °F (37 °C) (Tympanic)  02 SATS - 99%        DIAGNOSIS  Final diagnoses:   Diabetic ketoacidosis without coma associated with type 2 diabetes mellitus (CMS/HCC)   Hyperkalemia   Acute kidney injury (CMS/HCC)   Acute encephalopathy   COVID-19 virus infection   Hypoxia         DISPOSITION  HOSPITALIZATION    Discussed treatment plan and reason for hospitalization with pt/family and hospitalizing physician.  Pt/family voiced understanding of the plan for hospitalization for further testing/treatment as needed.                  Delonte Martínez MD  11/18/20 5625

## 2020-11-18 NOTE — ED NOTES
Cathryn Hussein, contacted for redraw of hemolyzed specimen.     Zachery Roberts RN  11/18/20 3451

## 2020-11-19 LAB
ALBUMIN SERPL-MCNC: 3.2 G/DL (ref 3.5–5.2)
ALBUMIN/GLOB SERPL: 0.9 G/DL
ALP SERPL-CCNC: 65 U/L (ref 39–117)
ALT SERPL W P-5'-P-CCNC: 13 U/L (ref 1–33)
ANION GAP SERPL CALCULATED.3IONS-SCNC: 12.3 MMOL/L (ref 5–15)
ANION GAP SERPL CALCULATED.3IONS-SCNC: 14.9 MMOL/L (ref 5–15)
ANION GAP SERPL CALCULATED.3IONS-SCNC: 15.6 MMOL/L (ref 5–15)
ANION GAP SERPL CALCULATED.3IONS-SCNC: 16.1 MMOL/L (ref 5–15)
ANION GAP SERPL CALCULATED.3IONS-SCNC: 16.5 MMOL/L (ref 5–15)
AST SERPL-CCNC: 16 U/L (ref 1–32)
BASOPHILS # BLD AUTO: 0.02 10*3/MM3 (ref 0–0.2)
BASOPHILS NFR BLD AUTO: 0.1 % (ref 0–1.5)
BILIRUB SERPL-MCNC: 0.4 MG/DL (ref 0–1.2)
BUN SERPL-MCNC: 56 MG/DL (ref 8–23)
BUN SERPL-MCNC: 60 MG/DL (ref 8–23)
BUN SERPL-MCNC: 66 MG/DL (ref 8–23)
BUN SERPL-MCNC: 68 MG/DL (ref 8–23)
BUN SERPL-MCNC: 70 MG/DL (ref 8–23)
BUN/CREAT SERPL: 47.1 (ref 7–25)
BUN/CREAT SERPL: 49.3 (ref 7–25)
BUN/CREAT SERPL: 50.4 (ref 7–25)
BUN/CREAT SERPL: 50.7 (ref 7–25)
BUN/CREAT SERPL: 51.5 (ref 7–25)
CALCIUM SPEC-SCNC: 7.6 MG/DL (ref 8.6–10.5)
CALCIUM SPEC-SCNC: 8.7 MG/DL (ref 8.6–10.5)
CALCIUM SPEC-SCNC: 9.2 MG/DL (ref 8.6–10.5)
CALCIUM SPEC-SCNC: 9.4 MG/DL (ref 8.6–10.5)
CALCIUM SPEC-SCNC: 9.4 MG/DL (ref 8.6–10.5)
CHLORIDE SERPL-SCNC: 118 MMOL/L (ref 98–107)
CHLORIDE SERPL-SCNC: 118 MMOL/L (ref 98–107)
CHLORIDE SERPL-SCNC: 121 MMOL/L (ref 98–107)
CHLORIDE SERPL-SCNC: 122 MMOL/L (ref 98–107)
CHLORIDE SERPL-SCNC: 122 MMOL/L (ref 98–107)
CO2 SERPL-SCNC: 12.9 MMOL/L (ref 22–29)
CO2 SERPL-SCNC: 15.1 MMOL/L (ref 22–29)
CO2 SERPL-SCNC: 15.4 MMOL/L (ref 22–29)
CO2 SERPL-SCNC: 15.7 MMOL/L (ref 22–29)
CO2 SERPL-SCNC: 16.5 MMOL/L (ref 22–29)
CREAT SERPL-MCNC: 1.19 MG/DL (ref 0.57–1)
CREAT SERPL-MCNC: 1.19 MG/DL (ref 0.57–1)
CREAT SERPL-MCNC: 1.34 MG/DL (ref 0.57–1)
CREAT SERPL-MCNC: 1.34 MG/DL (ref 0.57–1)
CREAT SERPL-MCNC: 1.36 MG/DL (ref 0.57–1)
DEPRECATED RDW RBC AUTO: 42.3 FL (ref 37–54)
EOSINOPHIL # BLD AUTO: 0 10*3/MM3 (ref 0–0.4)
EOSINOPHIL NFR BLD AUTO: 0 % (ref 0.3–6.2)
ERYTHROCYTE [DISTWIDTH] IN BLOOD BY AUTOMATED COUNT: 12.9 % (ref 12.3–15.4)
GFR SERPL CREATININE-BSD FRML MDRD: 44 ML/MIN/1.73
GFR SERPL CREATININE-BSD FRML MDRD: 45 ML/MIN/1.73
GFR SERPL CREATININE-BSD FRML MDRD: 45 ML/MIN/1.73
GFR SERPL CREATININE-BSD FRML MDRD: 52 ML/MIN/1.73
GFR SERPL CREATININE-BSD FRML MDRD: 52 ML/MIN/1.73
GLOBULIN UR ELPH-MCNC: 3.6 GM/DL
GLUCOSE BLDC GLUCOMTR-MCNC: 125 MG/DL (ref 70–130)
GLUCOSE BLDC GLUCOMTR-MCNC: 136 MG/DL (ref 70–130)
GLUCOSE BLDC GLUCOMTR-MCNC: 139 MG/DL (ref 70–130)
GLUCOSE BLDC GLUCOMTR-MCNC: 141 MG/DL (ref 70–130)
GLUCOSE BLDC GLUCOMTR-MCNC: 145 MG/DL (ref 70–130)
GLUCOSE BLDC GLUCOMTR-MCNC: 158 MG/DL (ref 70–130)
GLUCOSE BLDC GLUCOMTR-MCNC: 203 MG/DL (ref 70–130)
GLUCOSE BLDC GLUCOMTR-MCNC: 211 MG/DL (ref 70–130)
GLUCOSE BLDC GLUCOMTR-MCNC: 216 MG/DL (ref 70–130)
GLUCOSE BLDC GLUCOMTR-MCNC: 232 MG/DL (ref 70–130)
GLUCOSE BLDC GLUCOMTR-MCNC: 242 MG/DL (ref 70–130)
GLUCOSE BLDC GLUCOMTR-MCNC: 261 MG/DL (ref 70–130)
GLUCOSE BLDC GLUCOMTR-MCNC: 274 MG/DL (ref 70–130)
GLUCOSE BLDC GLUCOMTR-MCNC: 283 MG/DL (ref 70–130)
GLUCOSE BLDC GLUCOMTR-MCNC: 291 MG/DL (ref 70–130)
GLUCOSE BLDC GLUCOMTR-MCNC: 308 MG/DL (ref 70–130)
GLUCOSE BLDC GLUCOMTR-MCNC: 330 MG/DL (ref 70–130)
GLUCOSE BLDC GLUCOMTR-MCNC: 354 MG/DL (ref 70–130)
GLUCOSE BLDC GLUCOMTR-MCNC: 364 MG/DL (ref 70–130)
GLUCOSE SERPL-MCNC: 153 MG/DL (ref 65–99)
GLUCOSE SERPL-MCNC: 227 MG/DL (ref 65–99)
GLUCOSE SERPL-MCNC: 241 MG/DL (ref 65–99)
GLUCOSE SERPL-MCNC: 326 MG/DL (ref 65–99)
GLUCOSE SERPL-MCNC: 369 MG/DL (ref 65–99)
HCT VFR BLD AUTO: 37 % (ref 34–46.6)
HGB BLD-MCNC: 12 G/DL (ref 12–15.9)
IMM GRANULOCYTES # BLD AUTO: 0.14 10*3/MM3 (ref 0–0.05)
IMM GRANULOCYTES NFR BLD AUTO: 0.9 % (ref 0–0.5)
LYMPHOCYTES # BLD AUTO: 1.11 10*3/MM3 (ref 0.7–3.1)
LYMPHOCYTES NFR BLD AUTO: 7.5 % (ref 19.6–45.3)
MAGNESIUM SERPL-MCNC: 3.9 MG/DL (ref 1.6–2.4)
MAGNESIUM SERPL-MCNC: 4.1 MG/DL (ref 1.6–2.4)
MAGNESIUM SERPL-MCNC: 4.5 MG/DL (ref 1.6–2.4)
MAGNESIUM SERPL-MCNC: 4.6 MG/DL (ref 1.6–2.4)
MAGNESIUM SERPL-MCNC: 4.8 MG/DL (ref 1.6–2.4)
MCH RBC QN AUTO: 29.3 PG (ref 26.6–33)
MCHC RBC AUTO-ENTMCNC: 32.4 G/DL (ref 31.5–35.7)
MCV RBC AUTO: 90.5 FL (ref 79–97)
MONOCYTES # BLD AUTO: 1.63 10*3/MM3 (ref 0.1–0.9)
MONOCYTES NFR BLD AUTO: 11 % (ref 5–12)
NEUTROPHILS NFR BLD AUTO: 11.87 10*3/MM3 (ref 1.7–7)
NEUTROPHILS NFR BLD AUTO: 80.5 % (ref 42.7–76)
NRBC BLD AUTO-RTO: 0 /100 WBC (ref 0–0.2)
PHOSPHATE SERPL-MCNC: 2.3 MG/DL (ref 2.5–4.5)
PHOSPHATE SERPL-MCNC: 2.3 MG/DL (ref 2.5–4.5)
PHOSPHATE SERPL-MCNC: 2.7 MG/DL (ref 2.5–4.5)
PHOSPHATE SERPL-MCNC: 3 MG/DL (ref 2.5–4.5)
PHOSPHATE SERPL-MCNC: 3.6 MG/DL (ref 2.5–4.5)
PLATELET # BLD AUTO: 576 10*3/MM3 (ref 140–450)
PMV BLD AUTO: 9.8 FL (ref 6–12)
POTASSIUM SERPL-SCNC: 3.8 MMOL/L (ref 3.5–5.2)
POTASSIUM SERPL-SCNC: 4.9 MMOL/L (ref 3.5–5.2)
POTASSIUM SERPL-SCNC: 5 MMOL/L (ref 3.5–5.2)
POTASSIUM SERPL-SCNC: 5.1 MMOL/L (ref 3.5–5.2)
POTASSIUM SERPL-SCNC: 5.3 MMOL/L (ref 3.5–5.2)
PROT SERPL-MCNC: 6.8 G/DL (ref 6–8.5)
QT INTERVAL: 345 MS
RBC # BLD AUTO: 4.09 10*6/MM3 (ref 3.77–5.28)
SODIUM SERPL-SCNC: 148 MMOL/L (ref 136–145)
SODIUM SERPL-SCNC: 150 MMOL/L (ref 136–145)
SODIUM SERPL-SCNC: 150 MMOL/L (ref 136–145)
SODIUM SERPL-SCNC: 151 MMOL/L (ref 136–145)
SODIUM SERPL-SCNC: 153 MMOL/L (ref 136–145)
WBC # BLD AUTO: 14.77 10*3/MM3 (ref 3.4–10.8)

## 2020-11-19 PROCEDURE — 82962 GLUCOSE BLOOD TEST: CPT

## 2020-11-19 PROCEDURE — 25010000003 INSULIN REGULAR HUMAN PER 5 UNITS: Performed by: EMERGENCY MEDICINE

## 2020-11-19 PROCEDURE — 84100 ASSAY OF PHOSPHORUS: CPT | Performed by: EMERGENCY MEDICINE

## 2020-11-19 PROCEDURE — 25010000003 POTASSIUM CHLORIDE PER 2 MEQ: Performed by: EMERGENCY MEDICINE

## 2020-11-19 PROCEDURE — 25010000002 MAGNESIUM SULFATE 2 GM/50ML SOLUTION: Performed by: INTERNAL MEDICINE

## 2020-11-19 PROCEDURE — 83735 ASSAY OF MAGNESIUM: CPT | Performed by: EMERGENCY MEDICINE

## 2020-11-19 PROCEDURE — 36415 COLL VENOUS BLD VENIPUNCTURE: CPT | Performed by: EMERGENCY MEDICINE

## 2020-11-19 PROCEDURE — 80053 COMPREHEN METABOLIC PANEL: CPT | Performed by: INTERNAL MEDICINE

## 2020-11-19 PROCEDURE — 83735 ASSAY OF MAGNESIUM: CPT | Performed by: INTERNAL MEDICINE

## 2020-11-19 PROCEDURE — 85025 COMPLETE CBC W/AUTO DIFF WBC: CPT | Performed by: INTERNAL MEDICINE

## 2020-11-19 PROCEDURE — 25010000002 ENOXAPARIN PER 10 MG: Performed by: INTERNAL MEDICINE

## 2020-11-19 RX ORDER — METOPROLOL TARTRATE 50 MG/1
50 TABLET, FILM COATED ORAL EVERY 12 HOURS SCHEDULED
Status: DISCONTINUED | OUTPATIENT
Start: 2020-11-19 | End: 2020-11-24 | Stop reason: HOSPADM

## 2020-11-19 RX ORDER — CLONIDINE HYDROCHLORIDE 0.1 MG/1
0.1 TABLET ORAL EVERY 12 HOURS SCHEDULED
Status: DISCONTINUED | OUTPATIENT
Start: 2020-11-19 | End: 2020-11-24 | Stop reason: HOSPADM

## 2020-11-19 RX ADMIN — SODIUM CHLORIDE, PRESERVATIVE FREE 10 ML: 5 INJECTION INTRAVENOUS at 08:06

## 2020-11-19 RX ADMIN — SODIUM PHOSPHATE, MONOBASIC, MONOHYDRATE 20 MMOL: 276; 142 INJECTION, SOLUTION INTRAVENOUS at 09:26

## 2020-11-19 RX ADMIN — POTASSIUM CHLORIDE, DEXTROSE MONOHYDRATE AND SODIUM CHLORIDE 150 ML/HR: 150; 5; 450 INJECTION, SOLUTION INTRAVENOUS at 22:49

## 2020-11-19 RX ADMIN — POTASSIUM CHLORIDE AND SODIUM CHLORIDE 250 ML/HR: 450; 150 INJECTION, SOLUTION INTRAVENOUS at 21:46

## 2020-11-19 RX ADMIN — SODIUM CHLORIDE, PRESERVATIVE FREE 3 ML: 5 INJECTION INTRAVENOUS at 08:07

## 2020-11-19 RX ADMIN — POTASSIUM CHLORIDE, DEXTROSE MONOHYDRATE AND SODIUM CHLORIDE 150 ML/HR: 150; 5; 450 INJECTION, SOLUTION INTRAVENOUS at 05:26

## 2020-11-19 RX ADMIN — SODIUM CHLORIDE 1 UNITS/HR: 9 INJECTION, SOLUTION INTRAVENOUS at 11:06

## 2020-11-19 RX ADMIN — DEXTROSE AND SODIUM CHLORIDE 150 ML/HR: 5; 450 INJECTION, SOLUTION INTRAVENOUS at 15:14

## 2020-11-19 RX ADMIN — SODIUM CHLORIDE, PRESERVATIVE FREE 3 ML: 5 INJECTION INTRAVENOUS at 20:06

## 2020-11-19 RX ADMIN — POTASSIUM CHLORIDE AND SODIUM CHLORIDE 250 ML/HR: 450; 150 INJECTION, SOLUTION INTRAVENOUS at 02:00

## 2020-11-19 RX ADMIN — MAGNESIUM SULFATE HEPTAHYDRATE 2 G: 40 INJECTION, SOLUTION INTRAVENOUS at 04:10

## 2020-11-19 RX ADMIN — SODIUM CHLORIDE, PRESERVATIVE FREE 10 ML: 5 INJECTION INTRAVENOUS at 20:06

## 2020-11-19 RX ADMIN — MAGNESIUM SULFATE HEPTAHYDRATE 2 G: 40 INJECTION, SOLUTION INTRAVENOUS at 01:52

## 2020-11-19 RX ADMIN — ENOXAPARIN SODIUM 30 MG: 30 INJECTION SUBCUTANEOUS at 20:07

## 2020-11-19 NOTE — PROGRESS NOTES
"Adult Nutrition  Assessment/PES    Patient Name:  Gisela Gandara  YOB: 1932  MRN: [id number]  Admit Date:  11/18/2020    Assessment Date:  11/19/2020    Comments:  Nutrition screen per DKA protocol. Pt with hx of DM, diagnosed with COVID 19. Pt is confused and does not follow commands at this time. Diet Rx: NPO. Will follow for some form of nutrition as medical conditions allow.     Reason for Assessment     Row Name 11/19/20 0906          Reason for Assessment    Reason For Assessment  diagnosis/disease state     Diagnosis  -- DKA, covid 19, AKF, dementia, HTN, DM, amputation         Nutrition/Diet History     Row Name 11/19/20 0907          Nutrition/Diet History    Typical Food/Fluid Intake  confused at this time         Anthropometrics     Row Name 11/19/20 0907 11/19/20 0340       Anthropometrics    Height  157.5 cm (62.01\")  --    Weight  69.3 kg (152 lb 12.5 oz) not weighed by RD  69.3 kg (152 lb 12.5 oz)       Ideal Body Weight (IBW)    Ideal Body Weight (IBW) (kg)  50.45  --    % Ideal Body Weight  137.36  --       Body Mass Index (BMI)    BMI (kg/m2)  27.99  --    BMI Assessment  BMI 25-29.9: overweight  --       Ideal Body Weight (IBW), Amputee    Amputee Ideal Body Weight Assessment  5.9-->lower leg and foot, right 5.9%  --    Amputee Ideal Body Weight (IBW) Est (kg)  47.5  --        Labs/Tests/Procedures/Meds     Row Name 11/19/20 0908          Labs/Procedures/Meds    Lab Results Reviewed  reviewed     Lab Results Comments  na, glu, bun, cr, phos, mg, alb        Medications    Pertinent Medications Reviewed  reviewed     Pertinent Medications Comments  lovenox, insulin         Physical Findings     Row Name 11/19/20 0909          Physical Findings    Overall Physical Appearance  amputee     Skin  pressure injury;poor skin integrity/turgor bilateral sacral spine wound         Estimated/Assessed Needs     Row Name 11/19/20 0911 11/19/20 0907       Calculation Measurements    Weight Used " "For Calculations  47.5 kg (104 lb 11.5 oz)  --    Height  --  157.5 cm (62.01\")       Estimated/Assessed Needs    Additional Documentation  KCAL/KG (Group);Protein Requirements (Group);Fluid Requirements (Group)  --       KCAL/KG    KCAL/KG  30 Kcal/Kg (kcal)  --    25 Kcal/Kg (kcal)  --  --    30 Kcal/Kg (kcal)  1425  --       Protein Requirements    Weight Used For Protein Calculations  47.5 kg (104 lb 11.5 oz)  --    Est Protein Requirement Amount (gms/kg)  1.2 gm protein  --    Estimated Protein Requirements (gms/day)  57  --       Fluid Requirements    Fluid Requirements (mL/day)  1425  --    RDA Method (mL)  1425  --        Nutrition Prescription Ordered     Row Name 11/19/20 0911          Nutrition Prescription PO    Current PO Diet  NPO                 Problem/Interventions:                    Electronically signed by:  Diann Marie RD  11/19/20 09:18 EST  "

## 2020-11-19 NOTE — PLAN OF CARE
Pt remained in the CCU tonight. Pt is confused and does not follow commands or answers my questions. Pt does move all extremities. Pt is on room air and VSS stable. Pt is still in DKA on insulin drip and fluids. Will continue to monitor.

## 2020-11-19 NOTE — NURSING NOTE
[facility] dept - consult received for skin concerns to coccyx/sacrum. Patient has about 10x10 area of pink healed skin from previous wounds but there are no current open wounds. Due to previous damage skin is at risk for breakdown and use of Z guard and routine position changes appropriate treatment.

## 2020-11-19 NOTE — PROGRESS NOTES
"Dr. MARCIN Castellanos    [facility] CORONARY [facility]    11/19/2020    Patient ID:  Name:  Gisela Gandara  MRN:  [id number]  1/28/1932  88 y.o.  female            CC/Reason for visit: COVID-19 pneumonia, diabetic ketoacidosis, altered mental status    Interval hx: Patient is new to me.  She is confused, has baseline dementia.  She is receiving IV fluids and IV insulin with electrolyte replacement for her diabetic ketoacidosis.  I have reviewed the chart.  I reviewed H&P dictated by Dr. Karthik Hoffman.  Patient is requiring high FiO2 supplemental oxygen due to respiratory failure.    ROS: Unobtainable, patient has dementia    Past medical history, social history and family history: Reviewed and unchanged from admission H&P.    Vitals:  Vitals:    11/19/20 0700 11/19/20 0800 11/19/20 0859 11/19/20 0907   BP: 147/86 164/85     Pulse: 103 106     Resp:       Temp:   97.2 °F (36.2 °C)    TempSrc:       SpO2: 95% 93%     Weight:    69.3 kg (152 lb 12.5 oz)   Height:    157.5 cm (62.01\")           Body mass index is 27.94 kg/m².    Intake/Output Summary (Last 24 hours) at 11/19/2020 1031  Last data filed at 11/19/2020 0340  Gross per 24 hour   Intake 3045.09 ml   Output 200 ml   Net 2845.09 ml       Exam:  GEN:  Elderly who appears frail and acutely ill  Alert, oriented x 1.   LUNGS: Some coarse breath sounds bilaterally, no use of accessory muscles  CV:  Normal S1S2, without murmur, trace edema in the left leg.  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  ABD:  Non tender, no enlarged liver or masses  Skin: No visible rashes or palpable nodules    Scheduled meds:  enoxaparin, 30 mg, Subcutaneous, Q24H  sodium chloride, 10 mL, Intravenous, Q12H  sodium chloride, 3 mL, Intravenous, Q12H      IV meds:                      dextrose 5 % and sodium chloride 0.45 %, 150 mL/hr  dextrose 5 % and sodium chloride 0.45 % with KCl 20 mEq/L, 150 mL/hr, Last Rate: 150 mL/hr (11/19/20 0526)  dextrose 5 % and sodium chloride 0.45 " % with KCl 40 mEq/L, 150 mL/hr  dextrose 5 % and sodium chloride 0.9 %, 150 mL/hr  dextrose 5 % and sodium chloride 0.9 % with KCl 20 mEq, 150 mL/hr  dextrose 5% and sodium chloride 0.9% with KCl 40 mEq/L, 150 mL/hr  insulin, 7 Units/hr, Last Rate: 1 Units/hr (11/19/20 0930)  custom IV KCl infusion builder, 250 mL/hr  sodium chloride, 250 mL/hr, Last Rate: Stopped (11/19/20 0200)  sodium chloride 0.45 % with KCl 20 mEq, 250 mL/hr, Last Rate: Stopped (11/19/20 0526)  sodium chloride, 250 mL/hr  sodium chloride, 10 mL/hr  sodium chloride 0.9 % with KCl 20 mEq, 250 mL/hr  sodium chloride 0.9 % with KCl 40 mEq/L, 250 mL/hr        Data Review:   I reviewed the patient's medications and new clinical results.    COVID19   Date Value Ref Range Status   11/18/2020 Detected (C) Not Detected - Ref. Range Final         Lab Results   Component Value Date    CALCIUM 9.2 11/19/2020    PHOS 2.3 (L) 11/19/2020    MG 4.6 (H) 11/19/2020    MG 4.1 (H) 11/19/2020    MG 4.8 (H) 11/19/2020     Results from last 7 days   Lab Units 11/19/20  0753 11/19/20  0403 11/19/20  0058 11/18/20  1844  11/18/20  1738   SODIUM mmol/L 148* 153* 150* 143   < >  --    POTASSIUM mmol/L 5.0 4.9 3.8 6.8*   < >  --    CHLORIDE mmol/L 118* 122* 121* 109*   < >  --    CO2 mmol/L 15.1* 15.4* 12.9* 13.7*   < >  --    BUN mg/dL 70* 68* 60* 83*   < >  --    CREATININE mg/dL 1.36* 1.34* 1.19* 1.64*   < >  --    CALCIUM mg/dL 9.2 9.4 7.6* 10.1   < >  --    BILIRUBIN mg/dL  --   --  0.4 0.5  --   --    ALK PHOS U/L  --   --  65 85  --   --    ALT (SGPT) U/L  --   --  13 16  --   --    AST (SGOT) U/L  --   --  16 15  --   --    GLUCOSE mg/dL 153* 227* 369* 601*   < >  --    WBC 10*3/mm3  --  14.77*  --   --   --  12.94*   HEMOGLOBIN g/dL  --  12.0  --   --   --  13.6   PLATELETS 10*3/mm3  --  576*  --   --   --  610*   PROCALCITONIN ng/mL  --   --   --  0.11  --   --     < > = values in this interval not displayed.             Results from last 7 days   Lab Units  11/18/20  1844   TROPONIN T ng/mL 0.173*     Results from last 7 days   Lab Units 11/18/20  1813   PH, ARTERIAL pH units 7.403   PCO2, ARTERIAL mm Hg 23.7*   PO2 ART mm Hg 79.7*   FLOW RATE lpm 3   MODALITY  Cannula   O2 SATURATION CALC % 96.1       Estimated Creatinine Clearance: 26.1 mL/min (A) (by C-G formula based on SCr of 1.36 mg/dL (H)).      ASSESSMENT:   Acute metabolic encephalopathy  Baseline dementia  Diabetic ketoacidosis  Acute kidney injury  Hyperkalemia  Acute hypoxic respiratory failure due to COVID-19 pneumonia  Hypertension  Elevated troponin      PLAN:  Patient and all problems new to me during this admission.  She is critically ill, currently in diabetic ketoacidosis as well as battling acute respiratory failure due to COVID-19 infection.  We will continue with supportive measures.  We are holding off on dexamethasone and remdesivir due to diabetic ketoacidosis.  Patient has poor prognosis overall due to comorbidities and advanced age.  She does have baseline dementia as well.  I will reach out to power of healthcare  to discuss goals of care.  I would recommend palliative care and no resuscitation efforts.  Continue insulin, IV fluids and electrolyte replacement for DKA.    Total critical care time 35 minutes excluding any separately billable procedure time        Ciro Castellanos MD  11/19/2020

## 2020-11-19 NOTE — H&P
[facility]    CC: mental status changes, low oxygen    HPI:  Mrs. Gandara is a 89yo AAF with mutiple medical problems, brought from NH today for change in mental status and decreased oxygen.  Patient is unable to communicate with me at all, doesn't follow commands, doesn't tell me her name. Doesn't seem to react to me much at all. No further history is available. Work up in the ER reveals DKA and COVID +.  Patient is a DNR    PMH  DM  Amputation of the right le  PVD  HTN  Chronic pain  HLD    Social History     Socioeconomic History   • Marital status:      Spouse name: Not on file   • Number of children: Not on file   • Years of education: Not on file   • Highest education level: Not on file     No family history on file.  Meds: reviewed as per chart  ALL: NKDA  ROS: UTO    Vital Sign Min/Max for last 24 hours  Temp  Min: 98.6 °F (37 °C)  Max: 98.9 °F (37.2 °C)   BP  Min: 113/85  Max: 158/87   Pulse  Min: 85  Max: 95   Resp  Min: 19  Max: 28   SpO2  Min: 85 %  Max: 99 %   Flow (L/min)  Min: 3  Max: 5   Weight  Min: 74.8 kg (165 lb)  Max: 74.8 kg (165 lb)     GEN:   appears ill, obese,poorly responsive/interactive  HEENT: PERRL, no icterus, mmm, no jvd, trachea midline, neck supple  CHEST: CTA bilat, no wheezes, no crackles, no use of accessory muscles  CV: tachy, regaulr, no m/g/r  ABD: soft, nt, nd +bs, no hepatosplenomegaly  EXT: no c/c/e --s/p aka right  SKIN: no rashes, no xanthomas, decreased turgor, warm, dry  LYMPH: no palpable cervical or supraclavicular lymphadenopathy  NEURO: CN 2-12 intact and symmetric bilaterally --grossly limited exam given patient cooperation  PSYCH: unable to asses, poorly responsive  MSK: some kyphosis, normal muscular development    Labs: 11/18: reviewed:  Glucose 601  Bun 83  Cr 1.64  k 6.8  Bicarb 13.7  ldh 217  procal 0.11  Ferritin 706  Trop 0.173  bhydroxy 2.06  7.4/23/79  D dimer 2.36  crp 1.95  Lactate 2.6  Wbc 12.9  hgb 13.6  plts 610  CXR: 11/18:  probable bilateral faint ll infiltrates    A/P:  1. DKA -- insulin gtt, fluids, electrolyte replacement as needed  2. PEDRO LUIS -- iv fluids, support bp, avoid nephrotoxins  3. AHRF -- oxygen as needed  4. COVID 19 pneumonia -- hold off on dexamethasone until dka resolved, same for remdesivir.  5. Dementia with enecephalopathy  6. Elevated troponin  7. PVD  8. HTN -- hold bp meds for now    CC 40 mins excluding billable procedures.

## 2020-11-19 NOTE — ED NOTES
Pt in mask, nurse in full PPE. Pt repositioned for comfort and warm blanket applied. MD updating fm via phone.     Gertrudis Lindsey, RN  11/18/20 1946

## 2020-11-19 NOTE — PROGRESS NOTES
Discharge Planning Assessment  [facility]     Patient Name: Gisela Gandara  MRN: [id number]  Today's Date: 11/19/2020    Admit Date: 11/18/2020    Discharge Needs Assessment     Row Name 11/19/20 0959       Living Environment    Lives With  facility resident    Current Living Arrangements  extended care facility    Primary Care Provided by  self    Provides Primary Care For  no one    Quality of Family Relationships  unable to assess    Able to Return to Prior Arrangements  yes    Living Arrangement Comments  LTC IC level  medicaid can return has a bed hold       Transition Planning    Patient/Family Anticipates Transition to  long-term care facility    Patient/Family Anticipated Services at Transition  skilled nursing    Transportation Anticipated  family or friend will provide       Discharge Needs Assessment    Current Outpatient/Agency/Support Group  skilled nursing facility    Equipment Currently Used at Home  wheelchair    Concerns to be Addressed  discharge planning    Equipment Needed After Discharge  none    Outpatient/Agency/Support Group Needs  skilled nursing facility    Discharge Facility/Level of Care Needs  nursing facility, skilled        Discharge Plan     Row Name 11/19/20 1004       Plan    Plan  Return to [place] in LTC bed IC level    Plan Comments  CCP called out to pt daughter Pam, message left and requested return call.  Pt is from [facility].  Kofi will follow  She is in a LTC IC level bed May return  She has a bed hold.  Pt uses a wheel chair to ambulate.  She is dependent with most ADL's.  CCP willowing  Will attempt to reach daughter again to verify pt is to return to [place].        Continued Care and Services - Admitted Since 11/18/2020    Coordination has not been started for this encounter.         Demographic Summary    No documentation.       Functional Status    No documentation.       Psychosocial    No documentation.       Abuse/Neglect    No documentation.       Legal     No documentation.       Substance Abuse    No documentation.       Patient Forms    No documentation.           Ivone Reyes RN

## 2020-11-19 NOTE — PROGRESS NOTES
Clinical Pharmacy Services: Medication History    Gisela Gandara is a 88 y.o. female presenting to [facility] for   Chief Complaint   Patient presents with   • Shortness of Breath   • Cough   • Fever       She  has no past medical history on file.    Allergies as of 11/18/2020   • (No Known Allergies)       Medication information was obtained from: skilled nursing paperwork  Pharmacy and Phone Number:     Prior to Admission Medications     Prescriptions Last Dose Informant Patient Reported? Taking?    acetaminophen (TYLENOL) 500 MG tablet  Nursing Home Yes Yes    Take 500 mg by mouth Every 6 (Six) Hours As Needed for Mild Pain .    aspirin 81 MG EC tablet  Nursing Home Yes Yes    Take 81 mg by mouth Daily.    azithromycin (ZITHROMAX) 250 MG tablet  Nursing Home Yes Yes    Take 250 mg by mouth Daily.    cetirizine (zyrTEC) 10 MG tablet  Nursing Home Yes Yes    Take 10 mg by mouth Daily.    cloNIDine (CATAPRES) 0.1 MG tablet  Nursing Home Yes Yes    Take 0.1 mg by mouth 2 (Two) Times a Day.    dexamethasone (DECADRON) 6 MG tablet  Nursing Home Yes Yes    Take 6 mg by mouth Daily With Breakfast.    Diclofenac Sodium (VOLTAREN) 1 % gel gel  Nursing Home Yes Yes    Apply 4 g topically to the appropriate area as directed 2 (Two) Times a Day.    dilTIAZem CD (CARDIZEM CD) 240 MG 24 hr capsule  Nursing Home Yes Yes    Take 240 mg by mouth Daily.    gabapentin (NEURONTIN) 100 MG capsule  Nursing Home Yes Yes    Take 100 mg by mouth Daily.    glipizide (GLUCOTROL) 5 MG tablet  Nursing Home Yes Yes    Take 5 mg by mouth Daily.    guaiFENesin (ROBITUSSIN) 100 MG/5ML solution oral solution  Nursing Home Yes Yes    Take 200 mg by mouth Every 4 (Four) Hours As Needed.    HYDROcodone-acetaminophen (NORCO) 5-325 MG per tablet  Nursing Home Yes Yes    Take 1 tablet by mouth Every 12 (Twelve) Hours As Needed.    insulin detemir (LEVEMIR) 100 UNIT/ML injection  Nursing Home Yes Yes    Inject 12 Units under the skin into  the appropriate area as directed Daily.    melatonin 3 MG tablet  Nursing Home Yes Yes    Take 3 mg by mouth Every Night.    metFORMIN (GLUCOPHAGE) 500 MG tablet  Nursing Home Yes Yes    Take 1,000 mg by mouth 2 (Two) Times a Day With Meals.    metoprolol succinate XL (TOPROL-XL) 100 MG 24 hr tablet  Nursing Home Yes Yes    Take 100 mg by mouth Daily.    multivitamin with minerals (Centrum Silver Ultra Womens) tablet tablet  Nursing Home Yes Yes    Take 1 tablet by mouth Daily.    polyethylene glycol (MiraLax) 17 g packet  Nursing Home Yes Yes    Take 17 g by mouth Daily.    pravastatin (PRAVACHOL) 40 MG tablet  Nursing Home Yes Yes    Take 40 mg by mouth Daily.    spironolactone (ALDACTONE) 25 MG tablet  Nursing Home Yes Yes    Take 25 mg by mouth Daily.    valsartan-hydrochlorothiazide (DIOVAN-HCT) 320-12.5 MG per tablet  Nursing Home Yes Yes    Take 1 tablet by mouth Daily.    vitamin B-12 (CYANOCOBALAMIN) 500 MCG tablet  Nursing Home Yes Yes    Take 500 mcg by mouth Daily.            Medication notes:     This medication list is complete to the best of my knowledge as of 11/18/2020    Please call if questions.    Gabriella Nobles [facility]  Medication History Technician  [phone]    11/18/2020 19:28 EST

## 2020-11-20 LAB
ALBUMIN SERPL-MCNC: 2.9 G/DL (ref 3.5–5.2)
ANION GAP SERPL CALCULATED.3IONS-SCNC: 10.2 MMOL/L (ref 5–15)
ANION GAP SERPL CALCULATED.3IONS-SCNC: 13.4 MMOL/L (ref 5–15)
ANION GAP SERPL CALCULATED.3IONS-SCNC: 8.9 MMOL/L (ref 5–15)
BUN SERPL-MCNC: 34 MG/DL (ref 8–23)
BUN SERPL-MCNC: 48 MG/DL (ref 8–23)
BUN SERPL-MCNC: 51 MG/DL (ref 8–23)
BUN/CREAT SERPL: 41.5 (ref 7–25)
BUN/CREAT SERPL: 46.6 (ref 7–25)
BUN/CREAT SERPL: 46.8 (ref 7–25)
CALCIUM SPEC-SCNC: 7.7 MG/DL (ref 8.6–10.5)
CALCIUM SPEC-SCNC: 8.4 MG/DL (ref 8.6–10.5)
CALCIUM SPEC-SCNC: 8.8 MG/DL (ref 8.6–10.5)
CHLORIDE SERPL-SCNC: 121 MMOL/L (ref 98–107)
CHLORIDE SERPL-SCNC: 124 MMOL/L (ref 98–107)
CHLORIDE SERPL-SCNC: 125 MMOL/L (ref 98–107)
CO2 SERPL-SCNC: 15.8 MMOL/L (ref 22–29)
CO2 SERPL-SCNC: 16.6 MMOL/L (ref 22–29)
CO2 SERPL-SCNC: 18.1 MMOL/L (ref 22–29)
CREAT SERPL-MCNC: 0.82 MG/DL (ref 0.57–1)
CREAT SERPL-MCNC: 1.03 MG/DL (ref 0.57–1)
CREAT SERPL-MCNC: 1.09 MG/DL (ref 0.57–1)
GFR SERPL CREATININE-BSD FRML MDRD: 57 ML/MIN/1.73
GFR SERPL CREATININE-BSD FRML MDRD: 61 ML/MIN/1.73
GFR SERPL CREATININE-BSD FRML MDRD: 80 ML/MIN/1.73
GLUCOSE BLDC GLUCOMTR-MCNC: 103 MG/DL (ref 70–130)
GLUCOSE BLDC GLUCOMTR-MCNC: 110 MG/DL (ref 70–130)
GLUCOSE BLDC GLUCOMTR-MCNC: 111 MG/DL (ref 70–130)
GLUCOSE BLDC GLUCOMTR-MCNC: 113 MG/DL (ref 70–130)
GLUCOSE BLDC GLUCOMTR-MCNC: 115 MG/DL (ref 70–130)
GLUCOSE BLDC GLUCOMTR-MCNC: 145 MG/DL (ref 70–130)
GLUCOSE BLDC GLUCOMTR-MCNC: 158 MG/DL (ref 70–130)
GLUCOSE BLDC GLUCOMTR-MCNC: 169 MG/DL (ref 70–130)
GLUCOSE BLDC GLUCOMTR-MCNC: 199 MG/DL (ref 70–130)
GLUCOSE BLDC GLUCOMTR-MCNC: 202 MG/DL (ref 70–130)
GLUCOSE BLDC GLUCOMTR-MCNC: 91 MG/DL (ref 70–130)
GLUCOSE BLDC GLUCOMTR-MCNC: 99 MG/DL (ref 70–130)
GLUCOSE SERPL-MCNC: 117 MG/DL (ref 65–99)
GLUCOSE SERPL-MCNC: 121 MG/DL (ref 65–99)
GLUCOSE SERPL-MCNC: 191 MG/DL (ref 65–99)
MAGNESIUM SERPL-MCNC: 3.4 MG/DL (ref 1.6–2.4)
MAGNESIUM SERPL-MCNC: 3.6 MG/DL (ref 1.6–2.4)
PHOSPHATE SERPL-MCNC: 1.8 MG/DL (ref 2.5–4.5)
PHOSPHATE SERPL-MCNC: 2.8 MG/DL (ref 2.5–4.5)
PHOSPHATE SERPL-MCNC: 2.9 MG/DL (ref 2.5–4.5)
POTASSIUM SERPL-SCNC: 4.5 MMOL/L (ref 3.5–5.2)
POTASSIUM SERPL-SCNC: 4.5 MMOL/L (ref 3.5–5.2)
POTASSIUM SERPL-SCNC: 4.8 MMOL/L (ref 3.5–5.2)
SODIUM SERPL-SCNC: 147 MMOL/L (ref 136–145)
SODIUM SERPL-SCNC: 151 MMOL/L (ref 136–145)
SODIUM SERPL-SCNC: 155 MMOL/L (ref 136–145)

## 2020-11-20 PROCEDURE — 92610 EVALUATE SWALLOWING FUNCTION: CPT

## 2020-11-20 PROCEDURE — 82962 GLUCOSE BLOOD TEST: CPT

## 2020-11-20 PROCEDURE — 25010000002 ENOXAPARIN PER 10 MG: Performed by: INTERNAL MEDICINE

## 2020-11-20 PROCEDURE — 80069 RENAL FUNCTION PANEL: CPT | Performed by: INTERNAL MEDICINE

## 2020-11-20 PROCEDURE — 80048 BASIC METABOLIC PNL TOTAL CA: CPT | Performed by: INTERNAL MEDICINE

## 2020-11-20 PROCEDURE — 63710000001 INSULIN LISPRO (HUMAN) PER 5 UNITS: Performed by: INTERNAL MEDICINE

## 2020-11-20 PROCEDURE — 80048 BASIC METABOLIC PNL TOTAL CA: CPT | Performed by: EMERGENCY MEDICINE

## 2020-11-20 PROCEDURE — 83735 ASSAY OF MAGNESIUM: CPT | Performed by: INTERNAL MEDICINE

## 2020-11-20 PROCEDURE — 84100 ASSAY OF PHOSPHORUS: CPT | Performed by: EMERGENCY MEDICINE

## 2020-11-20 PROCEDURE — 63710000001 INSULIN GLARGINE PER 5 UNITS: Performed by: INTERNAL MEDICINE

## 2020-11-20 PROCEDURE — 36415 COLL VENOUS BLD VENIPUNCTURE: CPT | Performed by: INTERNAL MEDICINE

## 2020-11-20 PROCEDURE — 83735 ASSAY OF MAGNESIUM: CPT | Performed by: EMERGENCY MEDICINE

## 2020-11-20 PROCEDURE — 25010000003 INSULIN REGULAR HUMAN PER 5 UNITS: Performed by: EMERGENCY MEDICINE

## 2020-11-20 RX ORDER — NICOTINE POLACRILEX 4 MG
15 LOZENGE BUCCAL
Status: DISCONTINUED | OUTPATIENT
Start: 2020-11-20 | End: 2020-11-24 | Stop reason: HOSPADM

## 2020-11-20 RX ORDER — DEXTROSE MONOHYDRATE 25 G/50ML
25 INJECTION, SOLUTION INTRAVENOUS
Status: DISCONTINUED | OUTPATIENT
Start: 2020-11-20 | End: 2020-11-24 | Stop reason: HOSPADM

## 2020-11-20 RX ORDER — INSULIN GLARGINE 100 [IU]/ML
10 INJECTION, SOLUTION SUBCUTANEOUS ONCE
Status: COMPLETED | OUTPATIENT
Start: 2020-11-20 | End: 2020-11-20

## 2020-11-20 RX ORDER — DEXTROSE MONOHYDRATE 50 MG/ML
75 INJECTION, SOLUTION INTRAVENOUS ONCE
Status: COMPLETED | OUTPATIENT
Start: 2020-11-20 | End: 2020-11-20

## 2020-11-20 RX ORDER — INSULIN GLARGINE 100 [IU]/ML
15 INJECTION, SOLUTION SUBCUTANEOUS 2 TIMES DAILY
Status: DISCONTINUED | OUTPATIENT
Start: 2020-11-20 | End: 2020-11-21

## 2020-11-20 RX ADMIN — INSULIN LISPRO 4 UNITS: 100 INJECTION, SOLUTION INTRAVENOUS; SUBCUTANEOUS at 18:47

## 2020-11-20 RX ADMIN — CLONIDINE HYDROCHLORIDE 0.1 MG: 0.1 TABLET ORAL at 09:33

## 2020-11-20 RX ADMIN — DEXTROSE MONOHYDRATE 12.5 G: 500 INJECTION PARENTERAL at 02:01

## 2020-11-20 RX ADMIN — INSULIN GLARGINE 15 UNITS: 100 INJECTION, SOLUTION SUBCUTANEOUS at 05:35

## 2020-11-20 RX ADMIN — SODIUM PHOSPHATE, MONOBASIC, MONOHYDRATE 20 MMOL: 276; 142 INJECTION, SOLUTION INTRAVENOUS at 22:17

## 2020-11-20 RX ADMIN — INSULIN GLARGINE 10 UNITS: 100 INJECTION, SOLUTION SUBCUTANEOUS at 10:35

## 2020-11-20 RX ADMIN — INSULIN GLARGINE 15 UNITS: 100 INJECTION, SOLUTION SUBCUTANEOUS at 21:17

## 2020-11-20 RX ADMIN — INSULIN LISPRO 8 UNITS: 100 INJECTION, SOLUTION INTRAVENOUS; SUBCUTANEOUS at 12:43

## 2020-11-20 RX ADMIN — SODIUM CHLORIDE 3 UNITS/HR: 9 INJECTION, SOLUTION INTRAVENOUS at 05:18

## 2020-11-20 RX ADMIN — METOPROLOL TARTRATE 50 MG: 50 TABLET, FILM COATED ORAL at 22:19

## 2020-11-20 RX ADMIN — INSULIN LISPRO 4 UNITS: 100 INJECTION, SOLUTION INTRAVENOUS; SUBCUTANEOUS at 21:16

## 2020-11-20 RX ADMIN — DEXTROSE MONOHYDRATE 75 ML/HR: 50 INJECTION, SOLUTION INTRAVENOUS at 09:41

## 2020-11-20 RX ADMIN — POTASSIUM CHLORIDE, DEXTROSE MONOHYDRATE AND SODIUM CHLORIDE 150 ML/HR: 150; 5; 450 INJECTION, SOLUTION INTRAVENOUS at 05:18

## 2020-11-20 RX ADMIN — CLONIDINE HYDROCHLORIDE 0.1 MG: 0.1 TABLET ORAL at 22:18

## 2020-11-20 RX ADMIN — METOPROLOL TARTRATE 50 MG: 50 TABLET, FILM COATED ORAL at 09:33

## 2020-11-20 RX ADMIN — ENOXAPARIN SODIUM 40 MG: 40 INJECTION SUBCUTANEOUS at 21:16

## 2020-11-20 NOTE — PLAN OF CARE
Pt remained in the CCU tonight. DKA was resolved. VSS. Good UOP. Daughter updated over the phone. Will continue to monitor.

## 2020-11-20 NOTE — PLAN OF CARE
Problem: Adult Inpatient Plan of Care  Goal: Plan of Care Review  Flowsheets (Taken 11/20/2020 1480)  Plan of Care Reviewed With: patient  Outcome Summary:   Clinical swallow eval completed. Pt with tolerated ice chips, sips of water (cup/straw), and pureed trials with no s/s. Pt with consistent facial grimace with swallowing,  however, replied no when asked if it hurt to swallow. Pt had reduced mastication with soft solids with SLP removing partially masticated bolus. Pt was slow to elicit swallow at times, holding food in mouth. Recommend pureed diet with thin   meds in pureed as tolerated   upright for meals and 30 min after   slow rate   small bites/sips. ST to follow for diet tolerance.

## 2020-11-20 NOTE — THERAPY EVALUATION
Acute Care - Speech Language Pathology   Swallow Initial Evaluation [facility]     Patient Name: Gisela Gandara  : 1932  MRN: [id number]  Today's Date: 2020               Admit Date: 2020    Visit Dx:     ICD-10-CM ICD-9-CM   1. Diabetic ketoacidosis without coma associated with type 2 diabetes mellitus (CMS/HCC)  E11.10 250.12   2. Hyperkalemia  E87.5 276.7   3. Acute kidney injury (CMS/HCC)  N17.9 584.9   4. Acute encephalopathy  G93.40 348.30   5. COVID-19 virus infection  U07.1 079.89   6. Hypoxia  R09.02 799.02     Patient Active Problem List   Diagnosis   • Diabetic ketoacidosis without coma associated with type 2 diabetes mellitus (CMS/HCC)     Past Medical History:   Diagnosis Date   • Arthritis      History reviewed. No pertinent surgical history.     Patient was not wearing a face mask during this therapy encounter. Therapist used appropriate personal protective equipment including gown, eye protection, mask and gloves.  Mask used was N95/duckbill. Appropriate PPE was worn during the entire therapy session. Hand hygiene was completed before and after therapy session. Patient is in enhanced droplet precautions.        SWALLOW EVALUATION (last 72 hours)      SLP Adult Swallow Evaluation     Row Name 20 1400                   Rehab Evaluation    Document Type  evaluation  -AW        Subjective Information  no complaints  -AW        Patient Observations  alert;cooperative;agree to therapy  -AW        Patient/Family/Caregiver Comments/Observations  Pt positioned upright in bed.  -AW        Care Plan Review  evaluation/treatment results reviewed;patient/other agree to care plan  -AW        Patient Effort  good  -AW        Symptoms Noted During/After Treatment  none  -AW           General Information    Patient Profile Reviewed  yes  -AW        Pertinent History Of Current Problem  Pt admitted from NH with respiratory failure, Covid 19 PNA, and AMS; h/o demential.  -AW         Current Method of Nutrition  NPO  -AW        Precautions/Limitations, Vision  WFL;for purposes of eval  -AW        Precautions/Limitations, Hearing  WFL;for purposes of eval  -AW        Prior Level of Function-Communication  cognitive-linguistic impairment  -AW        Prior Level of Function-Swallowing  no diet consistency restrictions  -AW        Plans/Goals Discussed with  patient;agreed upon  -AW        Barriers to Rehab  medically complex  -AW        Patient's Goals for Discharge  patient did not state  -AW           Pain    Additional Documentation  Pain Scale: Numbers Pre/Post-Treatment (Group)  -AW           Pain Scale: Numbers Pre/Post-Treatment    Pretreatment Pain Rating  0/10 - no pain  -AW        Posttreatment Pain Rating  0/10 - no pain  -AW           Oral Motor Structure and Function    Dentition Assessment  edentulous, dentures not available  -AW        Secretion Management  WNL/WFL  -AW        Mucosal Quality  dry  -AW        Volitional Swallow  unable to elicit  -AW        Volitional Cough  unable to elicit  -AW           Oral Musculature and Cranial Nerve Assessment    Oral Motor General Assessment  generalized oral motor weakness  -AW        Oral Motor, Comment  minimal movements  -AW           General Eating/Swallowing Observations    Respiratory Support Currently in Use  room air  -AW        Eating/Swallowing Skills  fed by SLP  -AW        Positioning During Eating  upright in bed  -AW        Utensils Used  spoon;cup;straw  -AW        Consistencies Trialed  ice chips;thin liquids;pureed;soft textures  -AW        Pre SpO2 (%)  100  -AW        Post SpO2 (%)  98  -AW           Clinical Swallow Eval    Oral Prep Phase  impaired  -AW        Oral Residue  impaired  -AW        Pharyngeal Phase  suspected pharyngeal impairment  -AW        Clinical Swallow Evaluation Summary  Clinical swallow eval completed. Pt with tolerated ice chips, sips of water (cup/straw), and pureed trials with no s/s. Pt with  consistent facial grimace with swallowing,  however, replied no when asked if it hurt to swallow. Pt had reduced mastication with soft solids with SLP removing partially masticated bolus. Pt was slow to elicit swallow at times, holding food in mouth. Recommend pureed diet with thin; meds in pureed as tolerated; upright for meals and 30 min after; slow rate; small bites/sips. ST to follow for diet tolerance.   -AW           Clinical Impression    SLP Swallowing Diagnosis  oral dysphagia;suspected pharyngeal dysphagia  -AW        Functional Impact  risk of aspiration/pneumonia  -AW        Rehab Potential/Prognosis, Swallowing  good, to achieve stated therapy goals  -AW        Swallow Criteria for Skilled Therapeutic Interventions Met  demonstrates skilled criteria  -AW           Recommendations    Therapy Frequency (Swallow)  PRN  -AW        Predicted Duration Therapy Intervention (Days)  until discharge  -AW        SLP Diet Recommendation  puree;thin liquids  -AW        Recommended Precautions and Strategies  upright posture during/after eating;small bites of food and sips of liquid;check mouth frequently for oral residue/pocketing  -AW        Oral Care Recommendations  Oral Care before breakfast, after meals and PRN  -AW        SLP Rec. for Method of Medication Administration  meds whole;meds crushed;with pudding or applesauce;as tolerated  -AW        Monitor for Signs of Aspiration  yes;notify SLP if any concerns  -AW        Anticipated Discharge Disposition (SLP)  unknown  -AW           Swallow Goals (SLP)    Oral Nutrition/Hydration Goal Selection (SLP)  oral nutrition/hydration, SLP goal 1  -AW           Oral Nutrition/Hydration Goal 1 (SLP)    Oral Nutrition/Hydration Goal 1, SLP  Pt will safely tolerate the least restrictive diet with no s/s of aspiraiton.  -AW        Time Frame (Oral Nutrition/Hydration Goal 1, SLP)  by discharge  -AW          User Key  (r) = Recorded By, (t) = Taken By, (c) = Cosigned By     Initials Name Effective Dates    AW Juana Palm, MS CCC-SLP 06/08/18 -           EDUCATION  The patient has been educated in the following areas:   Dysphagia (Swallowing Impairment) Oral Care/Hydration Modified Diet Instruction.    SLP Recommendation and Plan  SLP Swallowing Diagnosis: oral dysphagia, suspected pharyngeal dysphagia  SLP Diet Recommendation: puree, thin liquids  Recommended Precautions and Strategies: upright posture during/after eating, small bites of food and sips of liquid, check mouth frequently for oral residue/pocketing  SLP Rec. for Method of Medication Administration: meds whole, meds crushed, with pudding or applesauce, as tolerated     Monitor for Signs of Aspiration: yes, notify SLP if any concerns     Swallow Criteria for Skilled Therapeutic Interventions Met: demonstrates skilled criteria  Anticipated Discharge Disposition (SLP): unknown  Rehab Potential/Prognosis, Swallowing: good, to achieve stated therapy goals  Therapy Frequency (Swallow): PRN  Predicted Duration Therapy Intervention (Days): until discharge                         Plan of Care Reviewed With: patient  Outcome Summary: Clinical swallow eval completed. Pt with tolerated ice chips, sips of water (cup/straw), and pureed trials with no s/s. Pt with consistent facial grimace with swallowing,  however, replied no when asked if it hurt to swallow. Pt had reduced mastication with soft solids with SLP removing partially masticated bolus. Pt was slow to elicit swallow at times, holding food in mouth. Recommend pureed diet with thin; meds in pureed as tolerated; upright for meals and 30 min after; slow rate; small bites/sips. ST to follow for diet tolerance.    SLP GOALS     Row Name 11/20/20 1400             Oral Nutrition/Hydration Goal 1 (SLP)    Oral Nutrition/Hydration Goal 1, SLP  Pt will safely tolerate the least restrictive diet with no s/s of aspiraiton.  -AW      Time Frame (Oral Nutrition/Hydration Goal 1, SLP)  by  discharge  -AW        User Key  (r) = Recorded By, (t) = Taken By, (c) = Cosigned By    Initials Name Provider Type    Juana Vila MS CCC-SLP Speech and Language Pathologist           SLP Outcome Measures (last 72 hours)      SLP Outcome Measures     Row Name 11/20/20 1400             SLP Outcome Measures    Outcome Measure Used?  Adult NOMS  -AW         Adult FCM Scores    FCM Chosen  Swallowing  -AW      Swallowing FCM Score  4  -AW        User Key  (r) = Recorded By, (t) = Taken By, (c) = Cosigned By    Initials Name Effective Dates    Juana Vila MS CCC-SLP 06/08/18 -            Time Calculation:   Time Calculation- SLP     Row Name 11/20/20 1500             Time Calculation- SLP    SLP Start Time  1400  -AW      SLP Received On  11/20/20  -AW        User Key  (r) = Recorded By, (t) = Taken By, (c) = Cosigned By    Initials Name Provider Type    Juana Vila MS CCC-SLP Speech and Language Pathologist          Therapy Charges for Today     Code Description Service Date Service Provider Modifiers Qty    [id number]  ST EVAL ORAL PHARYNG SWALLOW 4 11/20/2020 Juana Palm MS CCC-SLP GN 1               Juana Palm MS CCC-CARLOS  11/20/2020

## 2020-11-20 NOTE — PROGRESS NOTES
Dr. MARCIN Castellanos    [facility] CORONARY CARE    11/20/2020    Patient ID:  Name:  Gisela Gandara  MRN:  [id number]  1/28/1932  88 y.o.  female            CC/Reason for visit: COVID-19 pneumonia, diabetic ketoacidosis, altered mental status     Interval hx: Patient is slightly more communicative today.  She is still disoriented and confused but she did answer her name and date of birth appropriately.  Her sodium has increased significantly.  She has been dehydrated, nothing by mouth.     ROS: Unobtainable, patient has dementia    Vitals:  Vitals:    11/20/20 0507 11/20/20 0600 11/20/20 0700 11/20/20 0933   BP: 149/89 137/100 118/94 118/75   Pulse:  93 101 116   Resp:       Temp:       TempSrc:       SpO2:  97% 98%    Weight:       Height:               Body mass index is 29.55 kg/m².    Intake/Output Summary (Last 24 hours) at 11/20/2020 0945  Last data filed at 11/20/2020 0413  Gross per 24 hour   Intake 5821.2 ml   Output 1220 ml   Net 4601.2 ml       Exam:  GEN:  No distress  Alert, oriented only to herself, her name and her date of birth.  She is otherwise confused.  She does follow commands and gives me a thumbs up in both hands.  LUNGS: Clear breath sounds bilat, no use of accessory muscles  CV:  Normal S1S2, without murmur, trace edema in the left leg.  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    COVID19   Date Value Ref Range Status   11/18/2020 Detected  (C) Not Detected - Ref. Range Final         Lab Results   Component Value Date    CALCIUM 8.4 (L) 11/20/2020    PHOS 2.8 11/20/2020    MG 3.4 (H) 11/20/2020    MG 3.6 (H) 11/20/2020    MG 3.9 (H) 11/19/2020     Results from last 7 days   Lab Units 11/20/20  0354 11/20/20  0100 11/19/20  2004  11/19/20  0403 11/19/20  0058 11/18/20  1844  11/18/20  1738   SODIUM mmol/L 151* 155* 150*   < > 153* 150* 143   < >  --    POTASSIUM mmol/L 4.5 4.5 5.1   < > 4.9 3.8 6.8*   < >  --    CHLORIDE mmol/L 124* 125* 122*   < > 122* 121* 109*   < >  --    CO2 mmol/L 18.1* 16.6* 15.7*   < > 15.4* 12.9* 13.7*   < >  --    BUN mg/dL 48* 51* 56*   < > 68* 60* 83*   < >  --    CREATININE mg/dL 1.03* 1.09* 1.19*   < > 1.34* 1.19* 1.64*   < >  --    CALCIUM mg/dL 8.4* 8.8 8.7   < > 9.4 7.6* 10.1   < >  --    BILIRUBIN mg/dL  --   --   --   --   --  0.4 0.5  --   --    ALK PHOS U/L  --   --   --   --   --  65 85  --   --    ALT (SGPT) U/L  --   --   --   --   --  13 16  --   --    AST (SGOT) U/L  --   --   --   --   --  16 15  --   --    GLUCOSE mg/dL 117* 121* 326*   < > 227* 369* 601*   < >  --    WBC 10*3/mm3  --   --   --   --  14.77*  --   --   --  12.94*   HEMOGLOBIN g/dL  --   --   --   --  12.0  --   --   --  13.6   PLATELETS 10*3/mm3  --   --   --   --  576*  --   --   --  610*   PROCALCITONIN ng/mL  --   --   --   --   --   --  0.11  --   --     < > = values in this interval not displayed.         ASSESSMENT:   Acute metabolic encephalopathy  Baseline dementia  Diabetic ketoacidosis  Acute kidney injury  Hyperkalemia  Acute hypoxic respiratory failure due to COVID-19 pneumonia  Hypertension  Elevated troponin    PLAN:  We will start D5 water IV infusion for 1 L to treat her hypernatremia and dehydration.  She will need insulin dosing on a scheduled basis, long-acting Lantus twice daily.  We will continue checking her blood sugar every 4 hours for the next 24 hours until we better control her glycemia..  Start diet, advance orally  as tolerated  Start mobilizing the patient out of bed if possible.      Ciro Castellanos MD  11/20/2020

## 2020-11-21 LAB
GLUCOSE BLDC GLUCOMTR-MCNC: 138 MG/DL (ref 70–130)
GLUCOSE BLDC GLUCOMTR-MCNC: 141 MG/DL (ref 70–130)
GLUCOSE BLDC GLUCOMTR-MCNC: 292 MG/DL (ref 70–130)
GLUCOSE BLDC GLUCOMTR-MCNC: 372 MG/DL (ref 70–130)
GLUCOSE BLDC GLUCOMTR-MCNC: 97 MG/DL (ref 70–130)

## 2020-11-21 PROCEDURE — 82962 GLUCOSE BLOOD TEST: CPT

## 2020-11-21 PROCEDURE — 63710000001 INSULIN GLARGINE PER 5 UNITS: Performed by: INTERNAL MEDICINE

## 2020-11-21 PROCEDURE — 63710000001 INSULIN LISPRO (HUMAN) PER 5 UNITS: Performed by: INTERNAL MEDICINE

## 2020-11-21 PROCEDURE — 25010000002 ENOXAPARIN PER 10 MG: Performed by: INTERNAL MEDICINE

## 2020-11-21 RX ORDER — SENNA PLUS 8.6 MG/1
2 TABLET ORAL NIGHTLY
Status: DISCONTINUED | OUTPATIENT
Start: 2020-11-21 | End: 2020-11-24 | Stop reason: HOSPADM

## 2020-11-21 RX ORDER — DEXTROSE MONOHYDRATE 50 MG/ML
50 INJECTION, SOLUTION INTRAVENOUS CONTINUOUS
Status: DISCONTINUED | OUTPATIENT
Start: 2020-11-21 | End: 2020-11-22

## 2020-11-21 RX ORDER — BISACODYL 10 MG
10 SUPPOSITORY, RECTAL RECTAL DAILY PRN
Status: DISCONTINUED | OUTPATIENT
Start: 2020-11-21 | End: 2020-11-24 | Stop reason: HOSPADM

## 2020-11-21 RX ORDER — INSULIN GLARGINE 100 [IU]/ML
15 INJECTION, SOLUTION SUBCUTANEOUS NIGHTLY
Status: DISCONTINUED | OUTPATIENT
Start: 2020-11-21 | End: 2020-11-24 | Stop reason: HOSPADM

## 2020-11-21 RX ADMIN — SODIUM CHLORIDE, PRESERVATIVE FREE 3 ML: 5 INJECTION INTRAVENOUS at 01:21

## 2020-11-21 RX ADMIN — INSULIN GLARGINE 15 UNITS: 100 INJECTION, SOLUTION SUBCUTANEOUS at 09:39

## 2020-11-21 RX ADMIN — METOPROLOL TARTRATE 50 MG: 50 TABLET, FILM COATED ORAL at 09:27

## 2020-11-21 RX ADMIN — ENOXAPARIN SODIUM 40 MG: 40 INJECTION SUBCUTANEOUS at 22:20

## 2020-11-21 RX ADMIN — ACETAMINOPHEN 650 MG: 325 TABLET, FILM COATED ORAL at 14:27

## 2020-11-21 RX ADMIN — METOPROLOL TARTRATE 50 MG: 50 TABLET, FILM COATED ORAL at 22:19

## 2020-11-21 RX ADMIN — INSULIN LISPRO 20 UNITS: 100 INJECTION, SOLUTION INTRAVENOUS; SUBCUTANEOUS at 17:48

## 2020-11-21 RX ADMIN — SODIUM CHLORIDE, PRESERVATIVE FREE 10 ML: 5 INJECTION INTRAVENOUS at 09:28

## 2020-11-21 RX ADMIN — CLONIDINE HYDROCHLORIDE 0.1 MG: 0.1 TABLET ORAL at 09:27

## 2020-11-21 RX ADMIN — BISACODYL 10 MG: 10 SUPPOSITORY RECTAL at 17:48

## 2020-11-21 RX ADMIN — CLONIDINE HYDROCHLORIDE 0.1 MG: 0.1 TABLET ORAL at 22:19

## 2020-11-21 RX ADMIN — INSULIN LISPRO 12 UNITS: 100 INJECTION, SOLUTION INTRAVENOUS; SUBCUTANEOUS at 22:19

## 2020-11-21 RX ADMIN — SODIUM CHLORIDE, PRESERVATIVE FREE 10 ML: 5 INJECTION INTRAVENOUS at 01:21

## 2020-11-21 RX ADMIN — SENNOSIDES 2 TABLET: 8.6 TABLET, FILM COATED ORAL at 22:18

## 2020-11-21 RX ADMIN — DEXTROSE MONOHYDRATE 50 ML/HR: 50 INJECTION, SOLUTION INTRAVENOUS at 18:25

## 2020-11-21 RX ADMIN — INSULIN GLARGINE 15 UNITS: 100 INJECTION, SOLUTION SUBCUTANEOUS at 22:20

## 2020-11-21 NOTE — PLAN OF CARE
"Goal Outcome Evaluation:  Plan of Care Reviewed With: patient, family  Pt has had quiet night . Critical phosphorus level at start of shift resulted at 1.8 currently with 20mmol of phos infusing should finish around 0600. This will require am labs to be collected at 0800. Pt oriented times self and . Does follow commands. Reports they get her up at nursing home using the \"lift.\" but has not been verified with family. Sats good on room air denies pain or nausea. However, pt has witnessed bizarre po intake pattern ie tucks chin and shakes head and pockets material for long while . Required constant verbal and physical cuing to swallow. Was able to take metoprolol and clonidine with much encouragement. Remains overflow. Daughter called and was able to zoom with pt and multiple other family members for over 1 hour. Pt able to hold ipap and provide one word interactions. Dressing to coccyx clean dry and intact.          "

## 2020-11-21 NOTE — PROGRESS NOTES
[facility]  [phone]  Javier Greenwood MD    Subjective:  LOS: 3    She is mildly confused.  Awake and alert.  No distress.  Has baseline dementia.    Objective   Vital Signs past 24hrs    Temp range: Temp (24hrs), Av °F (36.7 °C), Min:96.1 °F (35.6 °C), Max:99 °F (37.2 °C)    BP range: BP: (100-214)/() 143/87  Pulse range: Heart Rate:  [] 89  Resp rate range: Resp:  [20] 20    Device (Oxygen Therapy): room air   Oxygen range:SpO2:  [95 %-100 %] 98 %      73.5 kg (162 lb 0.6 oz); Body mass index is 27.81 kg/m².    Intake/Output Summary (Last 24 hours) at 2020 1805  Last data filed at 2020 1757  Gross per 24 hour   Intake 2371 ml   Output 300 ml   Net 2071 ml       Physical Exam  HENT:      Head: Normocephalic.   Eyes:      Pupils: Pupils are equal, round, and reactive to light.   Cardiovascular:      Rate and Rhythm: Normal rate and regular rhythm.      Heart sounds: No murmur.   Pulmonary:      Breath sounds: Normal breath sounds. No wheezing or rales.   Abdominal:      General: Bowel sounds are normal.      Palpations: Abdomen is soft. There is no mass.      Tenderness: There is no abdominal tenderness.   Musculoskeletal:         General: No swelling.      Comments: Right AKA   Neurological:      Mental Status: She is alert.       Results Review:    I have reviewed the laboratory and imaging data since the last note by [facility] physician.  My annotations are noted in assessment and plan.    Medication Review:  I have reviewed the current MAR.  My annotations are noted in assessment and plan.    sodium chloride, 10 mL/hr      Plan   PCCM Problems  Acute metabolic encephalopathy  Acute hypernatremia  Diabetic ketoacidosis, treated  COVID-19 infection  Acute hypoxic respiratory failure, resolved  Hypertension  Baseline dementia  PEDRO LUIS, resolved  Dysphagia on modified diet    THESE ARE NEW MEDICAL PROBLEMS TO ME.    Plan of Treatment  Altered mental status appears better.  Patient  may be at her baseline dementia.    Still with hyponatremia.  Continue IV fluids with D5 water.    RN reports that patient is pocketing food and risk of aspiration as well as poor p.o. intake.  Dysphagia noted.  Unclear if baseline.  May complicate management of diabetes.    COVID-19 infection with no current sequela.    Needs replacement of low phosphorus.  Check again tomorrow.    Her sugars are low side, change lantus to once daily.    Needs placement.    Javier Greenwood MD  11/21/20  18:05 EST    While in the room and during my examination of the patient I wore gloves, gown, mask, eye protection and followed enhanced droplet/contact isolation protocol and precautions.  I washed my hands before and after this patient encounter.    Part of this note may be an electronic transcription/translation of spoken language to printed text using the Dragon Dictation System.

## 2020-11-22 LAB
ANION GAP SERPL CALCULATED.3IONS-SCNC: 8.7 MMOL/L (ref 5–15)
BUN SERPL-MCNC: 26 MG/DL (ref 8–23)
BUN/CREAT SERPL: 30.2 (ref 7–25)
CALCIUM SPEC-SCNC: 8.4 MG/DL (ref 8.6–10.5)
CHLORIDE SERPL-SCNC: 114 MMOL/L (ref 98–107)
CO2 SERPL-SCNC: 19.3 MMOL/L (ref 22–29)
CREAT SERPL-MCNC: 0.86 MG/DL (ref 0.57–1)
GFR SERPL CREATININE-BSD FRML MDRD: 75 ML/MIN/1.73
GLUCOSE BLDC GLUCOMTR-MCNC: 152 MG/DL (ref 70–130)
GLUCOSE BLDC GLUCOMTR-MCNC: 164 MG/DL (ref 70–130)
GLUCOSE BLDC GLUCOMTR-MCNC: 217 MG/DL (ref 70–130)
GLUCOSE BLDC GLUCOMTR-MCNC: 225 MG/DL (ref 70–130)
GLUCOSE BLDC GLUCOMTR-MCNC: 62 MG/DL (ref 70–130)
GLUCOSE BLDC GLUCOMTR-MCNC: 70 MG/DL (ref 70–130)
GLUCOSE SERPL-MCNC: 73 MG/DL (ref 65–99)
MAGNESIUM SERPL-MCNC: 2.6 MG/DL (ref 1.6–2.4)
PHOSPHATE SERPL-MCNC: 2.8 MG/DL (ref 2.5–4.5)
POTASSIUM SERPL-SCNC: 4 MMOL/L (ref 3.5–5.2)
SODIUM SERPL-SCNC: 142 MMOL/L (ref 136–145)

## 2020-11-22 PROCEDURE — 63710000001 INSULIN LISPRO (HUMAN) PER 5 UNITS: Performed by: INTERNAL MEDICINE

## 2020-11-22 PROCEDURE — 63710000001 INSULIN GLARGINE PER 5 UNITS: Performed by: INTERNAL MEDICINE

## 2020-11-22 PROCEDURE — U0004 COV-19 TEST NON-CDC HGH THRU: HCPCS | Performed by: INTERNAL MEDICINE

## 2020-11-22 PROCEDURE — 80048 BASIC METABOLIC PNL TOTAL CA: CPT | Performed by: INTERNAL MEDICINE

## 2020-11-22 PROCEDURE — 83735 ASSAY OF MAGNESIUM: CPT | Performed by: INTERNAL MEDICINE

## 2020-11-22 PROCEDURE — 84100 ASSAY OF PHOSPHORUS: CPT | Performed by: INTERNAL MEDICINE

## 2020-11-22 PROCEDURE — 82962 GLUCOSE BLOOD TEST: CPT

## 2020-11-22 PROCEDURE — 25010000002 ENOXAPARIN PER 10 MG: Performed by: INTERNAL MEDICINE

## 2020-11-22 RX ADMIN — SENNOSIDES 2 TABLET: 8.6 TABLET, FILM COATED ORAL at 21:40

## 2020-11-22 RX ADMIN — INSULIN GLARGINE 15 UNITS: 100 INJECTION, SOLUTION SUBCUTANEOUS at 21:41

## 2020-11-22 RX ADMIN — INSULIN LISPRO 8 UNITS: 100 INJECTION, SOLUTION INTRAVENOUS; SUBCUTANEOUS at 17:35

## 2020-11-22 RX ADMIN — ACETAMINOPHEN 650 MG: 325 TABLET, FILM COATED ORAL at 17:27

## 2020-11-22 RX ADMIN — METOPROLOL TARTRATE 50 MG: 50 TABLET, FILM COATED ORAL at 21:40

## 2020-11-22 RX ADMIN — ENOXAPARIN SODIUM 40 MG: 40 INJECTION SUBCUTANEOUS at 21:41

## 2020-11-22 RX ADMIN — SODIUM CHLORIDE, PRESERVATIVE FREE 10 ML: 5 INJECTION INTRAVENOUS at 08:10

## 2020-11-22 RX ADMIN — SODIUM CHLORIDE, PRESERVATIVE FREE 3 ML: 5 INJECTION INTRAVENOUS at 08:10

## 2020-11-22 RX ADMIN — INSULIN LISPRO 8 UNITS: 100 INJECTION, SOLUTION INTRAVENOUS; SUBCUTANEOUS at 11:50

## 2020-11-22 RX ADMIN — CLONIDINE HYDROCHLORIDE 0.1 MG: 0.1 TABLET ORAL at 08:09

## 2020-11-22 RX ADMIN — METOPROLOL TARTRATE 50 MG: 50 TABLET, FILM COATED ORAL at 08:09

## 2020-11-22 RX ADMIN — CLONIDINE HYDROCHLORIDE 0.1 MG: 0.1 TABLET ORAL at 21:40

## 2020-11-22 NOTE — PLAN OF CARE
Goal Outcome Evaluation:  Plan of Care Reviewed With: patient     Outcome Summary: Pt appears to pocket food when eating. Given sips of NTL with spoon as pt seemed to be choking on thin liquids--coughing after drinking. SLP note recommends thins with straw, but pt tolerateed NTL with spoon better. Pt does not want to swallow at times even with coaching. Turn Q2 d/t previous PI on buttocks/coccyx area that wants to open. Waiting on labs to determine if pt's electrolytes need replacing as pt's labs were not drawn yesterday after phos was given. Oral swabs to ensure pt's mouth is clean. D5W infusing @ 50mL/hr d/t Na+ level.

## 2020-11-22 NOTE — PROGRESS NOTES
[facility]  [phone]  Javier Greenwood MD    Subjective:  LOS: 4    Baseline dementia.  Otherwise looks good and feels well.  Denies cough of phlegm.  She is eager to get back to her residence.    Objective   Vital Signs past 24hrs    Temp range: Temp (24hrs), Av °F (36.1 °C), Min:96.6 °F (35.9 °C), Max:97.3 °F (36.3 °C)    BP range: BP: (141-166)/() 153/83  Pulse range: Heart Rate:  [63-97] 73  Resp rate range: Resp:  [18-22] 18    Device (Oxygen Therapy): room air   Oxygen range:SpO2:  [94 %-98 %] 96 %      73.5 kg (162 lb 0.6 oz); Body mass index is 27.81 kg/m².    Intake/Output Summary (Last 24 hours) at 2020 1742  Last data filed at 2020 1230  Gross per 24 hour   Intake 460 ml   Output 400 ml   Net 60 ml       Physical Exam  HENT:      Head: Normocephalic.   Eyes:      Pupils: Pupils are equal, round, and reactive to light.   Cardiovascular:      Rate and Rhythm: Normal rate and regular rhythm.      Heart sounds: No murmur.   Pulmonary:      Breath sounds: Normal breath sounds. No wheezing or rales.   Abdominal:      General: Bowel sounds are normal.      Palpations: Abdomen is soft. There is no mass.      Tenderness: There is no abdominal tenderness.   Musculoskeletal:         General: No swelling.      Comments: Right AKA   Neurological:      Mental Status: She is alert.       Results Review:    I have reviewed the laboratory and imaging data since the last note by [facility] physician.  My annotations are noted in assessment and plan.    Medication Review:  I have reviewed the current MAR.  My annotations are noted in assessment and plan.    dextrose, 50 mL/hr, Last Rate: 50 mL/hr (200)  sodium chloride, 10 mL/hr      Plan   PCCM Problems  Acute metabolic encephalopathy  Acute hypernatremia  Diabetic ketoacidosis, treated  COVID-19 infection  Acute hypoxic respiratory failure, resolved  Hypertension  Baseline dementia  PEDRO LUIS, resolved  Dysphagia on modified  diet        Plan of Treatment    Baseline dementia.    Hypernatremia better.  Stop D5 water in the morning.    RN reports that patient is pocketing food and risk of aspiration as well as poor p.o. intake.  Dysphagia noted.  Unclear if baseline.  May complicate management of diabetes.    COVID-19 infection with no current sequela.    Phosphorus is better.    Now on once daily Lantus insulin.    I updated dtr.    Will go to [facility]. Covid testing sent.    Javier Greenwood MD  11/22/20  17:42 EST    While in the room and during my examination of the patient I wore gloves, gown, mask, eye protection and followed enhanced droplet/contact isolation protocol and precautions.  I washed my hands before and after this patient encounter.    Part of this note may be an electronic transcription/translation of spoken language to printed text using the Dragon Dictation System.

## 2020-11-23 LAB
ANION GAP SERPL CALCULATED.3IONS-SCNC: 8.4 MMOL/L (ref 5–15)
BUN SERPL-MCNC: 20 MG/DL (ref 8–23)
BUN/CREAT SERPL: 29.4 (ref 7–25)
CALCIUM SPEC-SCNC: 8.1 MG/DL (ref 8.6–10.5)
CHLORIDE SERPL-SCNC: 113 MMOL/L (ref 98–107)
CO2 SERPL-SCNC: 19.6 MMOL/L (ref 22–29)
CREAT SERPL-MCNC: 0.68 MG/DL (ref 0.57–1)
DEPRECATED RDW RBC AUTO: 43.1 FL (ref 37–54)
ERYTHROCYTE [DISTWIDTH] IN BLOOD BY AUTOMATED COUNT: 13.1 % (ref 12.3–15.4)
GFR SERPL CREATININE-BSD FRML MDRD: 99 ML/MIN/1.73
GLUCOSE BLDC GLUCOMTR-MCNC: 120 MG/DL (ref 70–130)
GLUCOSE BLDC GLUCOMTR-MCNC: 199 MG/DL (ref 70–130)
GLUCOSE BLDC GLUCOMTR-MCNC: 226 MG/DL (ref 70–130)
GLUCOSE BLDC GLUCOMTR-MCNC: 262 MG/DL (ref 70–130)
GLUCOSE SERPL-MCNC: 118 MG/DL (ref 65–99)
HCT VFR BLD AUTO: 31 % (ref 34–46.6)
HGB BLD-MCNC: 10.1 G/DL (ref 12–15.9)
MCH RBC QN AUTO: 29.8 PG (ref 26.6–33)
MCHC RBC AUTO-ENTMCNC: 32.6 G/DL (ref 31.5–35.7)
MCV RBC AUTO: 91.4 FL (ref 79–97)
PLATELET # BLD AUTO: 364 10*3/MM3 (ref 140–450)
PMV BLD AUTO: 10.2 FL (ref 6–12)
POTASSIUM SERPL-SCNC: 3.8 MMOL/L (ref 3.5–5.2)
RBC # BLD AUTO: 3.39 10*6/MM3 (ref 3.77–5.28)
SARS-COV-2 RNA RESP QL NAA+PROBE: DETECTED
SODIUM SERPL-SCNC: 141 MMOL/L (ref 136–145)
WBC # BLD AUTO: 9.48 10*3/MM3 (ref 3.4–10.8)

## 2020-11-23 PROCEDURE — 63710000001 INSULIN GLARGINE PER 5 UNITS: Performed by: INTERNAL MEDICINE

## 2020-11-23 PROCEDURE — 80048 BASIC METABOLIC PNL TOTAL CA: CPT | Performed by: INTERNAL MEDICINE

## 2020-11-23 PROCEDURE — 82962 GLUCOSE BLOOD TEST: CPT

## 2020-11-23 PROCEDURE — 25010000002 ENOXAPARIN PER 10 MG: Performed by: INTERNAL MEDICINE

## 2020-11-23 PROCEDURE — 63710000001 INSULIN LISPRO (HUMAN) PER 5 UNITS: Performed by: INTERNAL MEDICINE

## 2020-11-23 PROCEDURE — 85027 COMPLETE CBC AUTOMATED: CPT | Performed by: INTERNAL MEDICINE

## 2020-11-23 RX ORDER — DILTIAZEM HYDROCHLORIDE 240 MG/1
240 CAPSULE, COATED, EXTENDED RELEASE ORAL
Status: DISCONTINUED | OUTPATIENT
Start: 2020-11-23 | End: 2020-11-24 | Stop reason: HOSPADM

## 2020-11-23 RX ADMIN — METOPROLOL TARTRATE 50 MG: 50 TABLET, FILM COATED ORAL at 09:09

## 2020-11-23 RX ADMIN — METOPROLOL TARTRATE 50 MG: 50 TABLET, FILM COATED ORAL at 22:14

## 2020-11-23 RX ADMIN — CLONIDINE HYDROCHLORIDE 0.1 MG: 0.1 TABLET ORAL at 09:09

## 2020-11-23 RX ADMIN — INSULIN LISPRO 4 UNITS: 100 INJECTION, SOLUTION INTRAVENOUS; SUBCUTANEOUS at 18:05

## 2020-11-23 RX ADMIN — DILTIAZEM HYDROCHLORIDE 240 MG: 240 CAPSULE, COATED, EXTENDED RELEASE ORAL at 22:15

## 2020-11-23 RX ADMIN — ENOXAPARIN SODIUM 40 MG: 40 INJECTION SUBCUTANEOUS at 22:14

## 2020-11-23 RX ADMIN — INSULIN GLARGINE 15 UNITS: 100 INJECTION, SOLUTION SUBCUTANEOUS at 22:15

## 2020-11-23 RX ADMIN — INSULIN LISPRO 8 UNITS: 100 INJECTION, SOLUTION INTRAVENOUS; SUBCUTANEOUS at 12:28

## 2020-11-23 RX ADMIN — ACETAMINOPHEN 650 MG: 325 TABLET, FILM COATED ORAL at 10:50

## 2020-11-23 RX ADMIN — CLONIDINE HYDROCHLORIDE 0.1 MG: 0.1 TABLET ORAL at 22:13

## 2020-11-23 RX ADMIN — INSULIN LISPRO 12 UNITS: 100 INJECTION, SOLUTION INTRAVENOUS; SUBCUTANEOUS at 22:14

## 2020-11-23 NOTE — PROGRESS NOTES
Continued Stay Note  [facility]     Patient Name: Gisela Gandara  MRN: [id number]  Today's Date: 11/23/2020    Admit Date: 11/18/2020    Discharge Plan     Row Name 11/23/20 1408       Plan    Plan  return to [facility] IC level bed    Patient/Family in Agreement with Plan  yes    Plan Comments  Called and spoke with daughter, Pam Gandara ( [phone]).  She confirms that patient will return to [facility] @ [place].  Spoke with [place]/[facility] and they can accept back.  Transfer packet in CCP office. Lilian Lopez RN        Discharge Codes    No documentation.             Lilian Lopez RN

## 2020-11-23 NOTE — PROGRESS NOTES
"  Daily Progress Note.   [address]  11/23/2020    Patient:  Name:  Gisela Gandara  MRN:  [id number]  1/28/1932  88 y.o.  female         CC: dka follow up    Interval History:  Follow up covid+ DKA  She is ill appearing chronically but is awake, answers simple questions denies cough, sputum, high fevers    ROS: No fever, no diarrhea, no chest pain  PMFSSH: no change    Physical Exam:  /93 (BP Location: Left arm, Patient Position: Lying)   Pulse 76   Temp 98 °F (36.7 °C) (Oral)   Resp 18   Ht 162.6 cm (64\")   Wt 78 kg (171 lb 15.3 oz)   SpO2 97%   BMI 29.52 kg/m²   Body mass index is 29.52 kg/m².    Intake/Output Summary (Last 24 hours) at 11/23/2020 1656  Last data filed at 11/23/2020 0900  Gross per 24 hour   Intake 360 ml   Output 750 ml   Net -390 ml     General appearance: NAD, conversant   Eyes: anicteric sclerae, moist conjunctivae; no lid-lag; PERRLA  HENT: Atraumatic; oropharynx clear with moist mucous membranes and no mucosal ulcerations; normal hard and soft palate  Neck: Trachea midline; FROM, supple, no thyromegaly or lymphadenopathy  Lungs: CTA, with normal respiratory effort and no intercostal retractions  CV: RRR, no MRGs   Abdomen: Soft, non-tender; no masses or HSM  Extremities: No peripheral edema or extremity lymphadenopathy  Skin: Normal temperature, turgor and texture; no rash, ulcers or subcutaneous nodules  Psych: Appropriate affect, alert and oriented to person      Data Review:  Notable Labs:  Results from last 7 days   Lab Units 11/23/20  0754 11/19/20  0403 11/18/20  1738   WBC 10*3/mm3 9.48 14.77* 12.94*   HEMOGLOBIN g/dL 10.1* 12.0 13.6   PLATELETS 10*3/mm3 364 576* 610*     Results from last 7 days   Lab Units 11/23/20  0754 11/22/20  0819 11/20/20  1831 11/20/20  0354 11/20/20  0100 11/19/20  2004 11/19/20  1209 11/19/20  0753 11/19/20  0403   SODIUM mmol/L 141 142 147* 151* 155* 150* 151* 148* 153*   POTASSIUM mmol/L 3.8 4.0 4.8 4.5 4.5 5.1 5.3* " 5.0 4.9   CHLORIDE mmol/L 113* 114* 121* 124* 125* 122* 118* 118* 122*   CO2 mmol/L 19.6* 19.3* 15.8* 18.1* 16.6* 15.7* 16.5* 15.1* 15.4*   BUN mg/dL 20 26* 34* 48* 51* 56* 66* 70* 68*   CREATININE mg/dL 0.68 0.86 0.82 1.03* 1.09* 1.19* 1.34* 1.36* 1.34*   GLUCOSE mg/dL 118* 73 191* 117* 121* 326* 241* 153* 227*   CALCIUM mg/dL 8.1* 8.4* 7.7* 8.4* 8.8 8.7 9.4 9.2 9.4   MAGNESIUM mg/dL  --  2.6*  --  3.4* 3.6* 3.9* 4.5* 4.6* 4.1*   PHOSPHORUS mg/dL  --  2.8 1.8* 2.8 2.9 3.6 3.0 2.3* 2.3*   Estimated Creatinine Clearance: 49.1 mL/min (by C-G formula based on SCr of 0.68 mg/dL).    Results from last 7 days   Lab Units 11/23/20  0754 11/19/20  0403 11/19/20  0058 11/18/20  2249 11/18/20  1844 11/18/20  1738   LDH U/L  --   --   --   --  217*  --    AST (SGOT) U/L  --   --  16  --  15  --    ALT (SGPT) U/L  --   --  13  --  16  --    PROCALCITONIN ng/mL  --   --   --   --  0.11  --    LACTATE mmol/L  --   --   --  1.7  --  2.6*   FERRITIN ng/mL  --   --   --   --  706.00*  --    D DIMER QUANT MCGFEU/mL  --   --   --   --   --  2.36*   CRP mg/dL  --   --   --   --   --  1.95*   PLATELETS 10*3/mm3 364 576*  --   --   --  610*       Results from last 7 days   Lab Units 11/18/20  1813   PH, ARTERIAL pH units 7.403   PCO2, ARTERIAL mm Hg 23.7*   PO2 ART mm Hg 79.7*   HCO3 ART mmol/L 14.8*       Imaging:  Reviewed chest images personally from past 3 days    Scheduled meds:    cloNIDine, 0.1 mg, Oral, Q12H  enoxaparin, 40 mg, Subcutaneous, Q24H  insulin glargine, 15 Units, Subcutaneous, Nightly  insulin lispro, 0-24 Units, Subcutaneous, 4x Daily With Meals & Nightly  metoprolol tartrate, 50 mg, Oral, Q12H  senna, 2 tablet, Oral, Nightly  sodium chloride, 10 mL, Intravenous, Q12H  sodium chloride, 3 mL, Intravenous, Q12H        ASSESSMENT  /  PLAN:  Acute metabolic encephalopathy  Acute hypernatremia  Diabetic ketoacidosis, treated  COVID-19 infection  Acute hypoxic respiratory failure, resolved  Hypertension  Baseline  dementia  PEDRO LUIS, resolved  Dysphagia on modified diet     Sodium improved  tolerating covid well  Covid + on repeat for placement  Will see if Covid + bed at [facility] available  If so potential dc tomorrow  bp on higher side restart diltiazem home dose  All issues new to me today.  Prior hospital course, labs and imaging reviewed.        Star Hoffman MD  [facility]  11/23/20  16:56 EST

## 2020-11-23 NOTE — PLAN OF CARE
Goal Outcome Evaluation:  Plan of Care Reviewed With: patient  Progress: improving       Vss, mepilex over wound was changed. Q2 turns to position off wound on the coccyx. Patient remains on room air with oxygen saturation staying above 92%. Will continue to monitor.

## 2020-11-23 NOTE — PLAN OF CARE
Goal Outcome Evaluation:  Plan of Care Reviewed With: patient  Progress: improving  Outcome Summary: Pt better this shift. Able to put pills in her mouth on her own and hold cups of fluids on her own to drink. Keeps asking to go home. Only gave Lantus 15 units tonight d/t BG of 152. Pt's appetite poor per report. Also reported previous PI wanting to open so larger Mepilex in place and turning every 2 hrs. Pt's labs better yesterday AM. Waiting on labs for Monday AM. Pt much more alert and talkative. Hopefully back to [facility] on Monday.

## 2020-11-24 VITALS
TEMPERATURE: 97.6 F | DIASTOLIC BLOOD PRESSURE: 58 MMHG | BODY MASS INDEX: 29.19 KG/M2 | SYSTOLIC BLOOD PRESSURE: 148 MMHG | OXYGEN SATURATION: 99 % | HEART RATE: 63 BPM | WEIGHT: 171 LBS | RESPIRATION RATE: 18 BRPM | HEIGHT: 64 IN

## 2020-11-24 LAB
ANION GAP SERPL CALCULATED.3IONS-SCNC: 11.7 MMOL/L (ref 5–15)
BUN SERPL-MCNC: 17 MG/DL (ref 8–23)
BUN/CREAT SERPL: 22.7 (ref 7–25)
CALCIUM SPEC-SCNC: 8.3 MG/DL (ref 8.6–10.5)
CHLORIDE SERPL-SCNC: 113 MMOL/L (ref 98–107)
CO2 SERPL-SCNC: 16.3 MMOL/L (ref 22–29)
CREAT SERPL-MCNC: 0.75 MG/DL (ref 0.57–1)
GFR SERPL CREATININE-BSD FRML MDRD: 88 ML/MIN/1.73
GLUCOSE BLDC GLUCOMTR-MCNC: 173 MG/DL (ref 70–130)
GLUCOSE BLDC GLUCOMTR-MCNC: 201 MG/DL (ref 70–130)
GLUCOSE BLDC GLUCOMTR-MCNC: 74 MG/DL (ref 70–130)
GLUCOSE SERPL-MCNC: 72 MG/DL (ref 65–99)
POTASSIUM SERPL-SCNC: 4 MMOL/L (ref 3.5–5.2)
SODIUM SERPL-SCNC: 141 MMOL/L (ref 136–145)

## 2020-11-24 PROCEDURE — 82962 GLUCOSE BLOOD TEST: CPT

## 2020-11-24 PROCEDURE — 80048 BASIC METABOLIC PNL TOTAL CA: CPT | Performed by: INTERNAL MEDICINE

## 2020-11-24 PROCEDURE — 63710000001 INSULIN LISPRO (HUMAN) PER 5 UNITS: Performed by: INTERNAL MEDICINE

## 2020-11-24 RX ADMIN — INSULIN LISPRO 4 UNITS: 100 INJECTION, SOLUTION INTRAVENOUS; SUBCUTANEOUS at 18:28

## 2020-11-24 RX ADMIN — INSULIN LISPRO 8 UNITS: 100 INJECTION, SOLUTION INTRAVENOUS; SUBCUTANEOUS at 12:56

## 2020-11-24 RX ADMIN — DILTIAZEM HYDROCHLORIDE 240 MG: 240 CAPSULE, COATED, EXTENDED RELEASE ORAL at 09:58

## 2020-11-24 RX ADMIN — SODIUM CHLORIDE, PRESERVATIVE FREE 10 ML: 5 INJECTION INTRAVENOUS at 09:58

## 2020-11-24 RX ADMIN — ACETAMINOPHEN 650 MG: 325 TABLET, FILM COATED ORAL at 18:28

## 2020-11-24 RX ADMIN — METOPROLOL TARTRATE 50 MG: 50 TABLET, FILM COATED ORAL at 09:58

## 2020-11-24 RX ADMIN — ACETAMINOPHEN 650 MG: 325 TABLET, FILM COATED ORAL at 09:57

## 2020-11-24 RX ADMIN — SODIUM CHLORIDE, PRESERVATIVE FREE 3 ML: 5 INJECTION INTRAVENOUS at 09:59

## 2020-11-24 RX ADMIN — CLONIDINE HYDROCHLORIDE 0.1 MG: 0.1 TABLET ORAL at 09:58

## 2020-11-24 NOTE — PROGRESS NOTES
Continued Stay Note  [facility]     Patient Name: Gisela Gandara  MRN: [id number]  Today's Date: 11/24/2020    Admit Date: 11/18/2020    Discharge Plan     Row Name 11/24/20 1708       Plan    Plan Comments  DC orders in EPIC.  Daughter, Pam, updated on dc.  Transfer packet updated and dc summary.  Patient will return to [facility] via [facility] tonight @ 2130. Lilian Lopez RN    Row Name 11/24/20 1032       Plan    Plan  return to [facility] LTC    Patient/Family in Agreement with Plan  yes    Plan Comments  Waiting on MD to round.  Spoke with [place]/[facility] and they can accept back this evening if ready for dc.  [facility] ( [phone]) booked for 2130.  Transfer packet in cubbie.  Will need to cancel ambulance if not dc'd. Lilian Lopez RN        Discharge Codes    No documentation.       Expected Discharge Date and Time     Expected Discharge Date Expected Discharge Time    Nov 24, 2020             Lilian Lopez RN

## 2020-11-24 NOTE — PROGRESS NOTES
Case Management Discharge Note      Final Note: DC to skilled bed at [facility] via [facility]. Lilian Lopez RN         Selected Continued Care - Admitted Since 11/18/2020     Destination Coordination complete    Service Provider Selected Services Address Phone Fax Patient Preferred    [facility]  Skilled Nursing [address] [id number] [phone] --          Durable Medical Equipment    No services have been selected for the patient.              Dialysis/Infusion    No services have been selected for the patient.              Home Medical Care    No services have been selected for the patient.              Therapy    No services have been selected for the patient.              Community Resources    No services have been selected for the patient.                  Transportation Services  Ambulance: ([facility])    Final Discharge Disposition Code: 03 - skilled nursing facility (SNF)

## 2020-11-24 NOTE — PROGRESS NOTES
Continued Stay Note  [facility]     Patient Name: Gisela Gandara  MRN: [id number]  Today's Date: 11/24/2020    Admit Date: 11/18/2020    Discharge Plan     Row Name 11/24/20 1425       Plan    Plan  return to [facility] LT    Patient/Family in Agreement with Plan  yes    Plan Comments  Waiting on MD to round.  Spoke with [place]/[facility] and they can accept back this evening if ready for dc.  [facility] ( [phone]) booked for 2130.  Transfer packet in cubbie.  Will need to cancel ambulance if not dc'd. Lilian Lopez RN        Discharge Codes    No documentation.       Expected Discharge Date and Time     Expected Discharge Date Expected Discharge Time    Nov 24, 2020             Lilian Lopez RN

## 2020-11-24 NOTE — NURSING NOTE
WOCN dept - patient presented to hospital with healed areas to sacrum from chronic skin breakdown, skin was fragile and had dry patches of skin. Topical barrier cream was initiated. Patient has 2 susy to sacrum with partial thickness skin loss 2x1 cm and 0.5x1 cm consistent with friction on compromised skin. No need for topical dressing but continue liberal use of Z guard to protect and pressure relief. I assisted patient onto L side and propped with a pillow achieving adequate pressure relief. Patient is suppose to transfer out to LTC facility this evening. If patient still here tomorrow will order a low air loss mattress.

## 2020-11-24 NOTE — PLAN OF CARE
Goal Outcome Evaluation:  Plan of Care Reviewed With: patient  Progress: improving  Outcome Summary: Pt tolerated Cardizem PO for BP control. Continue to turn every 2 hours d/t PI on coccyx/sacrum trying to open. Pt only c/o pain in that area when putting HOB up to eat/drink/take pills. Pt keeps saying she wants to go home.  last night. Pt very alert this shift and also informed NA and RN she lives @ [facility]. COVID swab positive.

## 2020-11-24 NOTE — DISCHARGE SUMMARY
PHYSICIAN DISCHARGE SUMMARY                                                                          [facility]    Patient Identification:  Name: Gisela Gandara  Age: 88 y.o.  Sex: female  :  1932  MRN: [id number]  Primary Care Physician: Rigoberto Law MD    Admit date: 2020  Discharge date:20  Discharged Condition: good    Discharge Diagnoses:    Diabetic ketoacidosis without coma associated with type 2 diabetes mellitus (CMS/HCC)       Hospital Course:   Patient is an 87yo with mmp with baseline dementia admitted for altered mental status and found to be in DKA and COVID +.  She was treated with iv insulin, ivfs, and serial monitor of electrolytes.  She was tolerating diet with good glycemic control on room air prior to discharge.  Her blood pressure meds were slowly being up titrated and will resume her home meds upon discharge.    Consults:   IP CONSULT TO NUTRITION SERVICES  IP CONSULT TO PULMONOLOGY    Consults:   IP CONSULT TO NUTRITION SERVICES  IP CONSULT TO PULMONOLOGY    Significant Discharge Diagnostics   Procedures Performed:         Pertinent Lab Results:  Results from last 7 days   Lab Units 20  0748 20  0754 20  0819 20  1831 20  0354 20  0100 20  2004  20  0058 20  1844   SODIUM mmol/L 141 141 142 147* 151* 155* 150*   < > 150* 143   POTASSIUM mmol/L 4.0 3.8 4.0 4.8 4.5 4.5 5.1   < > 3.8 6.8*   CHLORIDE mmol/L 113* 113* 114* 121* 124* 125* 122*   < > 121* 109*   CO2 mmol/L 16.3* 19.6* 19.3* 15.8* 18.1* 16.6* 15.7*   < > 12.9* 13.7*   BUN mg/dL 17 20 26* 34* 48* 51* 56*   < > 60* 83*   CREATININE mg/dL 0.75 0.68 0.86 0.82 1.03* 1.09* 1.19*   < > 1.19* 1.64*   GLUCOSE mg/dL 72 118* 73 191* 117* 121* 326*   < > 369* 601*   CALCIUM mg/dL 8.3* 8.1* 8.4* 7.7* 8.4* 8.8 8.7   < > 7.6* 10.1   AST (SGOT) U/L  --   --   --   --   --   --   --   --  16 15   ALT (SGPT) U/L   --   --   --   --   --   --   --   --  13 16    < > = values in this interval not displayed.     Results from last 7 days   Lab Units 11/18/20  1844   TROPONIN T ng/mL 0.173*     Results from last 7 days   Lab Units 11/23/20  0754 11/19/20  0403 11/18/20  1738   WBC 10*3/mm3 9.48 14.77* 12.94*   HEMOGLOBIN g/dL 10.1* 12.0 13.6   HEMATOCRIT % 31.0* 37.0 42.1   PLATELETS 10*3/mm3 364 576* 610*   MCV fL 91.4 90.5 94.0   MCH pg 29.8 29.3 30.4   MCHC g/dL 32.6 32.4 32.3   RDW % 13.1 12.9 13.4   RDW-SD fl 43.1 42.3 45.3   MPV fL 10.2 9.8 10.1   NEUTROPHIL % %  --  80.5* 79.5*   LYMPHOCYTE % %  --  7.5* 9.1*   MONOCYTES % %  --  11.0 10.7   EOSINOPHIL % %  --  0.0* 0.0*   BASOPHIL % %  --  0.1 0.2   IMM GRAN % %  --  0.9* 0.5   NEUTROS ABS 10*3/mm3  --  11.87* 10.28*   LYMPHS ABS 10*3/mm3  --  1.11 1.18   MONOS ABS 10*3/mm3  --  1.63* 1.39*   EOS ABS 10*3/mm3  --  0.00 0.00   BASOS ABS 10*3/mm3  --  0.02 0.02   IMMATURE GRANS (ABS) 10*3/mm3  --  0.14* 0.07*   NRBC /100 WBC  --  0.0 0.1         Results from last 7 days   Lab Units 11/22/20  0819 11/20/20  0354 11/20/20  0100 11/19/20 2004 11/19/20  1209 11/19/20  0753 11/19/20  0403   MAGNESIUM mg/dL 2.6* 3.4* 3.6* 3.9* 4.5* 4.6* 4.1*           Invalid input(s): LDLCALC          Results from last 7 days   Lab Units 11/18/20  1813   PH, ARTERIAL pH units 7.403   PO2 ART mm Hg 79.7*   PCO2, ARTERIAL mm Hg 23.7*   HCO3 ART mmol/L 14.8*     Results from last 7 days   Lab Units 11/18/20  2249 11/18/20  1844 11/18/20  1738   PROCALCITONIN ng/mL  --  0.11  --    LACTATE mmol/L 1.7  --  2.6*     Results from last 7 days   Lab Units 11/18/20  1738   CRP mg/dL 1.95*                 Results from last 7 days   Lab Units 11/18/20  1743   ADENOVIRUS DETECTION BY PCR  Not Detected   CORONAVIRUS 229E  Not Detected   CORONAVIRUS HKU1  Not Detected   CORONAVIRUS NL63  Not Detected   CORONAVIRUS OC43  Not Detected   HUMAN METAPNEUMOVIRUS  Not Detected   HUMAN RHINOVIRUS/ENTEROVIRUS  Not  "Detected   INFLUENZA B PCR  Not Detected   PARAINFLUENZA 1  Not Detected   PARAINFLUENZA VIRUS 2  Not Detected   PARAINFLUENZA VIRUS 3  Not Detected   PARAINFLUENZA VIRUS 4  Not Detected   BORDETELLA PERTUSSIS PCR  Not Detected   CHLAMYDOPHILA PNEUMONIAE PCR  Not Detected   MYCOPLAMA PNEUMO PCR  Not Detected   INFLUENZA A PCR  Not Detected   RSV, PCR  Not Detected           Imaging Results:  Imaging Results (All)     Procedure Component Value Units Date/Time    XR Chest AP [[id number]] Collected: 11/18/20 1854     Updated: 11/18/20 1858    Narrative:      XR CHEST AP-     Clinical: Hypoxia, Covid evaluation 80-year-old female     FINDINGS: The patient is leaning towards the left and there appears to  be some increased left basilar opacity which probably represents  atelectasis. No effusion or edema or consolidation is demonstrated and  the right lung is clear. Heart size within normal limits. There is  atherosclerotic calcification of the aorta. The remainder of examination  is unremarkable.     This report was finalized on 11/18/2020 6:55 PM by Dr. Nehemiah Boone M.D.             Discharge Exam:  /58 (BP Location: Left arm, Patient Position: Lying)   Pulse 63   Temp 97.6 °F (36.4 °C) (Oral)   Resp 18   Ht 162.6 cm (64\")   Wt 77.6 kg (171 lb)   SpO2 99%   BMI 29.35 kg/m²   General appearance: NAD, conversant   Eyes: anicteric sclerae, moist conjunctivae; no lid-lag; PERRLA  HENT: Atraumatic; oropharynx clear with moist mucous membranes and no mucosal ulcerations; normal hard and soft palate  Neck: Trachea midline; FROM, supple, no thyromegaly or lymphadenopathy  Lungs: CTA, with normal respiratory effort and no intercostal retractions  CV: RRR, no MRGs   Abdomen: Soft, non-tender; no masses or HSM  Extremities: No peripheral edema or extremity lymphadenopathy  Skin: Normal temperature, turgor and texture; no rash, ulcers or subcutaneous nodules  Psych: Appropriate affect, alert and oriented to " person    Discharge Disposition NH      Discharge Medications     Discharge Medications      New Medications      Instructions Start Date   insulin lispro 100 UNIT/ML injection  Commonly known as: humaLOG   0-24 Units, Subcutaneous, 4 Times Daily With Meals & Nightly         Changes to Medications      Instructions Start Date   insulin detemir 100 UNIT/ML injection  Commonly known as: LEVEMIR  What changed: how much to take   15 Units, Subcutaneous, Daily         Continue These Medications      Instructions Start Date   acetaminophen 500 MG tablet  Commonly known as: TYLENOL   500 mg, Oral, Every 6 Hours PRN      aspirin 81 MG EC tablet   81 mg, Oral, Daily      azithromycin 250 MG tablet  Commonly known as: ZITHROMAX   250 mg, Oral, Daily      Centrum Silver Ultra Womens tablet tablet   1 tablet, Oral, Daily      cetirizine 10 MG tablet  Commonly known as: zyrTEC   10 mg, Oral, Daily      cloNIDine 0.1 MG tablet  Commonly known as: CATAPRES   0.1 mg, Oral, 2 Times Daily      Diclofenac Sodium 1 % gel gel  Commonly known as: VOLTAREN   4 g, Topical, 2 Times Daily      dilTIAZem  MG 24 hr capsule  Commonly known as: CARDIZEM CD   240 mg, Oral, Daily      gabapentin 100 MG capsule  Commonly known as: NEURONTIN   100 mg, Oral, Daily      guaiFENesin 100 MG/5ML solution oral solution  Commonly known as: ROBITUSSIN   200 mg, Oral, Every 4 Hours PRN      melatonin 3 MG tablet   3 mg, Oral, Nightly      metoprolol succinate  MG 24 hr tablet  Commonly known as: TOPROL-XL   100 mg, Oral, Daily      MiraLax 17 g packet  Generic drug: polyethylene glycol   17 g, Oral, Daily      pravastatin 40 MG tablet  Commonly known as: PRAVACHOL   40 mg, Oral, Daily      spironolactone 25 MG tablet  Commonly known as: ALDACTONE   25 mg, Oral, Daily      valsartan-hydrochlorothiazide 320-12.5 MG per tablet  Commonly known as: DIOVAN-HCT   1 tablet, Oral, Daily      vitamin B-12 500 MCG tablet  Commonly known as: CYANOCOBALAMIN    500 mcg, Oral, Daily         Stop These Medications    dexamethasone 6 MG tablet  Commonly known as: DECADRON     glipizide 5 MG tablet  Commonly known as: GLUCOTROL     HYDROcodone-acetaminophen 5-325 MG per tablet  Commonly known as: NORCO     metFORMIN 500 MG tablet  Commonly known as: GLUCOPHAGE            Discharge Diet: diabetic diet   Outcome Summary:  • Clinical swallow eval completed. Pt with tolerated ice chips, sips of water (cup/straw), and pureed trials with no s/s. Pt with consistent facial grimace with swallowing,  however, replied no when asked if it hurt to swallow. Pt had reduced mastication with soft solids with SLP removing partially masticated bolus. Pt was slow to elicit swallow at times, holding food in mouth. Recommend pureed diet with thin  • meds in pureed as tolerated  • upright for meals and 30 min after  • slow rate  • small bites/sips. ST to follow for diet tolerance.    Activity at Discharge: up with assistance.    Contact information for after-discharge care     Destination     [facility] .    Service: Skilled Nursing  Contact information:  [address] [place] [id number] [phone]                       Total time spent discharging patient including evaluation, medication reconciliation, arranging follow up, and post hospitalization instructions and education total time exceeds 30 minutes.    Signed:  Star Hoffman MD  11/24/2020  15:31 EST

## 2020-11-25 NOTE — NURSING NOTE
RN attempted to give report to [facility] approx. 2017. No answer. Chantal attempted to call report 2x. Will call again.

## 2022-01-01 ENCOUNTER — APPOINTMENT (OUTPATIENT)
Dept: GENERAL RADIOLOGY | Facility: HOSPITAL | Age: 87
End: 2022-01-01

## 2022-01-01 ENCOUNTER — APPOINTMENT (OUTPATIENT)
Dept: CT IMAGING | Facility: HOSPITAL | Age: 87
End: 2022-01-01

## 2022-01-01 ENCOUNTER — HOSPITAL ENCOUNTER (INPATIENT)
Facility: HOSPITAL | Age: 87
LOS: 8 days | Discharge: NURSING, REHAB OR LONG-TERM CARE | End: 2022-03-24
Attending: EMERGENCY MEDICINE | Admitting: HOSPITALIST

## 2022-01-01 ENCOUNTER — HOSPITAL ENCOUNTER (INPATIENT)
Facility: HOSPITAL | Age: 87
LOS: 2 days | End: 2022-04-08
Attending: EMERGENCY MEDICINE | Admitting: HOSPITALIST

## 2022-01-01 ENCOUNTER — APPOINTMENT (OUTPATIENT)
Dept: CARDIOLOGY | Facility: HOSPITAL | Age: 87
End: 2022-01-01

## 2022-01-01 VITALS
BODY MASS INDEX: 28.11 KG/M2 | RESPIRATION RATE: 18 BRPM | WEIGHT: 152.78 LBS | HEART RATE: 80 BPM | TEMPERATURE: 97.7 F | HEIGHT: 62 IN | SYSTOLIC BLOOD PRESSURE: 146 MMHG | OXYGEN SATURATION: 100 % | DIASTOLIC BLOOD PRESSURE: 63 MMHG

## 2022-01-01 VITALS
RESPIRATION RATE: 18 BRPM | TEMPERATURE: 98.2 F | DIASTOLIC BLOOD PRESSURE: 24 MMHG | OXYGEN SATURATION: 91 % | BODY MASS INDEX: 28.11 KG/M2 | HEIGHT: 62 IN | SYSTOLIC BLOOD PRESSURE: 74 MMHG | WEIGHT: 152.78 LBS

## 2022-01-01 DIAGNOSIS — D72.829 LEUKOCYTOSIS, UNSPECIFIED TYPE: ICD-10-CM

## 2022-01-01 DIAGNOSIS — K56.7 ILEUS: ICD-10-CM

## 2022-01-01 DIAGNOSIS — R13.10 DYSPHAGIA, UNSPECIFIED TYPE: ICD-10-CM

## 2022-01-01 DIAGNOSIS — J18.9 PNEUMONIA OF LEFT LOWER LOBE DUE TO INFECTIOUS ORGANISM: Primary | ICD-10-CM

## 2022-01-01 DIAGNOSIS — R53.1 GENERALIZED WEAKNESS: ICD-10-CM

## 2022-01-01 DIAGNOSIS — R79.89 ELEVATED PROCALCITONIN: ICD-10-CM

## 2022-01-01 DIAGNOSIS — N39.0 ACUTE UTI: Primary | ICD-10-CM

## 2022-01-01 DIAGNOSIS — R11.2 NAUSEA AND VOMITING IN ADULT: ICD-10-CM

## 2022-01-01 LAB
ALBUMIN SERPL-MCNC: 2 G/DL (ref 3.5–5.2)
ALBUMIN SERPL-MCNC: 2.2 G/DL (ref 3.5–5.2)
ALBUMIN SERPL-MCNC: 2.2 G/DL (ref 3.5–5.2)
ALBUMIN SERPL-MCNC: 2.7 G/DL (ref 3.5–5.2)
ALBUMIN SERPL-MCNC: 2.9 G/DL (ref 3.5–5.2)
ALBUMIN/GLOB SERPL: 0.5 G/DL
ALBUMIN/GLOB SERPL: 0.6 G/DL
ALBUMIN/GLOB SERPL: 0.6 G/DL
ALBUMIN/GLOB SERPL: 0.7 G/DL
ALBUMIN/GLOB SERPL: 0.8 G/DL
ALP SERPL-CCNC: 155 U/L (ref 39–117)
ALP SERPL-CCNC: 206 U/L (ref 39–117)
ALP SERPL-CCNC: 218 U/L (ref 39–117)
ALP SERPL-CCNC: 223 U/L (ref 39–117)
ALP SERPL-CCNC: 240 U/L (ref 39–117)
ALT SERPL W P-5'-P-CCNC: 16 U/L (ref 1–33)
ALT SERPL W P-5'-P-CCNC: 28 U/L (ref 1–33)
ALT SERPL W P-5'-P-CCNC: 30 U/L (ref 1–33)
ALT SERPL W P-5'-P-CCNC: 35 U/L (ref 1–33)
ALT SERPL W P-5'-P-CCNC: 39 U/L (ref 1–33)
ANION GAP SERPL CALCULATED.3IONS-SCNC: 10 MMOL/L (ref 5–15)
ANION GAP SERPL CALCULATED.3IONS-SCNC: 10 MMOL/L (ref 5–15)
ANION GAP SERPL CALCULATED.3IONS-SCNC: 10.9 MMOL/L (ref 5–15)
ANION GAP SERPL CALCULATED.3IONS-SCNC: 11.1 MMOL/L (ref 5–15)
ANION GAP SERPL CALCULATED.3IONS-SCNC: 11.8 MMOL/L (ref 5–15)
ANION GAP SERPL CALCULATED.3IONS-SCNC: 12 MMOL/L (ref 5–15)
ANION GAP SERPL CALCULATED.3IONS-SCNC: 12 MMOL/L (ref 5–15)
ANION GAP SERPL CALCULATED.3IONS-SCNC: 13 MMOL/L (ref 5–15)
ANION GAP SERPL CALCULATED.3IONS-SCNC: 13.3 MMOL/L (ref 5–15)
ANION GAP SERPL CALCULATED.3IONS-SCNC: 13.8 MMOL/L (ref 5–15)
ANION GAP SERPL CALCULATED.3IONS-SCNC: 14 MMOL/L (ref 5–15)
ANION GAP SERPL CALCULATED.3IONS-SCNC: 15.8 MMOL/L (ref 5–15)
ANION GAP SERPL CALCULATED.3IONS-SCNC: 16 MMOL/L (ref 5–15)
ANION GAP SERPL CALCULATED.3IONS-SCNC: 20.7 MMOL/L (ref 5–15)
ANION GAP SERPL CALCULATED.3IONS-SCNC: 9 MMOL/L (ref 5–15)
ANION GAP SERPL CALCULATED.3IONS-SCNC: 9.8 MMOL/L (ref 5–15)
ANION GAP SERPL CALCULATED.3IONS-SCNC: 9.9 MMOL/L (ref 5–15)
AORTIC DIMENSIONLESS INDEX: 0.6 (DI)
APTT PPP: 116.9 SECONDS (ref 22.7–35.4)
APTT PPP: 30.3 SECONDS (ref 22.7–35.4)
APTT PPP: 31.4 SECONDS (ref 22.7–35.4)
APTT PPP: 33.2 SECONDS (ref 22.7–35.4)
APTT PPP: >200 SECONDS (ref 22.7–35.4)
APTT PPP: >200 SECONDS (ref 22.7–35.4)
AST SERPL-CCNC: 17 U/L (ref 1–32)
AST SERPL-CCNC: 28 U/L (ref 1–32)
AST SERPL-CCNC: 39 U/L (ref 1–32)
AST SERPL-CCNC: 48 U/L (ref 1–32)
AST SERPL-CCNC: 57 U/L (ref 1–32)
B-OH-BUTYR SERPL-SCNC: 0.54 MMOL/L (ref 0.02–0.27)
BACTERIA ISLT: NORMAL
BACTERIA ISLT: NORMAL
BACTERIA SPEC AEROBE CULT: ABNORMAL
BACTERIA SPEC AEROBE CULT: NORMAL
BACTERIA SPEC AEROBE CULT: NORMAL
BACTERIA UR QL AUTO: ABNORMAL /HPF
BACTERIA UR QL AUTO: ABNORMAL /HPF
BASOPHILS # BLD AUTO: 0.02 10*3/MM3 (ref 0–0.2)
BASOPHILS # BLD AUTO: 0.02 10*3/MM3 (ref 0–0.2)
BASOPHILS # BLD AUTO: 0.03 10*3/MM3 (ref 0–0.2)
BASOPHILS # BLD AUTO: 0.04 10*3/MM3 (ref 0–0.2)
BASOPHILS # BLD AUTO: 0.05 10*3/MM3 (ref 0–0.2)
BASOPHILS # BLD AUTO: 0.05 10*3/MM3 (ref 0–0.2)
BASOPHILS # BLD AUTO: 0.06 10*3/MM3 (ref 0–0.2)
BASOPHILS # BLD AUTO: 0.07 10*3/MM3 (ref 0–0.2)
BASOPHILS NFR BLD AUTO: 0.2 % (ref 0–1.5)
BASOPHILS NFR BLD AUTO: 0.3 % (ref 0–1.5)
BASOPHILS NFR BLD AUTO: 0.4 % (ref 0–1.5)
BASOPHILS NFR BLD AUTO: 0.5 % (ref 0–1.5)
BASOPHILS NFR BLD AUTO: 0.5 % (ref 0–1.5)
BASOPHILS NFR BLD AUTO: 0.6 % (ref 0–1.5)
BASOPHILS NFR BLD AUTO: 0.6 % (ref 0–1.5)
BH CV ECHO MEAS - ACS: 1.17 CM
BH CV ECHO MEAS - AO MAX PG: 16.2 MMHG
BH CV ECHO MEAS - AO MEAN PG: 8 MMHG
BH CV ECHO MEAS - AO ROOT DIAM: 2.4 CM
BH CV ECHO MEAS - AO V2 MAX: 201 CM/SEC
BH CV ECHO MEAS - AO V2 VTI: 42.9 CM
BH CV ECHO MEAS - AVA(I,D): 2.03 CM2
BH CV ECHO MEAS - EDV(CUBED): 41 ML
BH CV ECHO MEAS - EDV(MOD-SP2): 59 ML
BH CV ECHO MEAS - EDV(MOD-SP4): 74 ML
BH CV ECHO MEAS - EF(MOD-BP): 78.5 %
BH CV ECHO MEAS - EF(MOD-SP2): 83.1 %
BH CV ECHO MEAS - EF(MOD-SP4): 74.3 %
BH CV ECHO MEAS - ESV(CUBED): 6.8 ML
BH CV ECHO MEAS - ESV(MOD-SP2): 10 ML
BH CV ECHO MEAS - ESV(MOD-SP4): 19 ML
BH CV ECHO MEAS - FS: 45 %
BH CV ECHO MEAS - IVS/LVPW: 1.12 CM
BH CV ECHO MEAS - IVSD: 1.25 CM
BH CV ECHO MEAS - LAT PEAK E' VEL: 8.9 CM/SEC
BH CV ECHO MEAS - LV MASS(C)D: 129.8 GRAMS
BH CV ECHO MEAS - LV MAX PG: 5 MMHG
BH CV ECHO MEAS - LV MEAN PG: 2.5 MMHG
BH CV ECHO MEAS - LV V1 MAX: 111.6 CM/SEC
BH CV ECHO MEAS - LV V1 VTI: 25.9 CM
BH CV ECHO MEAS - LVIDD: 3.4 CM
BH CV ECHO MEAS - LVIDS: 1.9 CM
BH CV ECHO MEAS - LVOT AREA: 3.4 CM2
BH CV ECHO MEAS - LVOT DIAM: 2.07 CM
BH CV ECHO MEAS - LVPWD: 1.11 CM
BH CV ECHO MEAS - MED PEAK E' VEL: 5.1 CM/SEC
BH CV ECHO MEAS - MR MAX PG: 92.4 MMHG
BH CV ECHO MEAS - MR MAX VEL: 480.5 CM/SEC
BH CV ECHO MEAS - MV A MAX VEL: 158 CM/SEC
BH CV ECHO MEAS - MV DEC SLOPE: 363.9 CM/SEC2
BH CV ECHO MEAS - MV DEC TIME: 0.27 MSEC
BH CV ECHO MEAS - MV E MAX VEL: 101 CM/SEC
BH CV ECHO MEAS - MV E/A: 0.64
BH CV ECHO MEAS - MV MAX PG: 9.2 MMHG
BH CV ECHO MEAS - MV MEAN PG: 3.2 MMHG
BH CV ECHO MEAS - MV V2 VTI: 38.7 CM
BH CV ECHO MEAS - MVA(VTI): 2.25 CM2
BH CV ECHO MEAS - PA V2 MAX: 76 CM/SEC
BH CV ECHO MEAS - RAP SYSTOLE: 3 MMHG
BH CV ECHO MEAS - RV MAX PG: 1.59 MMHG
BH CV ECHO MEAS - RV V1 MAX: 63 CM/SEC
BH CV ECHO MEAS - RV V1 VTI: 15.1 CM
BH CV ECHO MEAS - RVOT DIAM: 2.17 CM
BH CV ECHO MEAS - RVSP: 37 MMHG
BH CV ECHO MEAS - SV(LVOT): 87.1 ML
BH CV ECHO MEAS - SV(MOD-SP2): 49 ML
BH CV ECHO MEAS - SV(MOD-SP4): 55 ML
BH CV ECHO MEAS - SV(RVOT): 56 ML
BH CV ECHO MEAS - TAPSE (>1.6): 2 CM
BH CV ECHO MEAS - TR MAX PG: 34.4 MMHG
BH CV ECHO MEAS - TR MAX VEL: 293.2 CM/SEC
BH CV ECHO MEASUREMENTS AVERAGE E/E' RATIO: 14.43
BH CV XLRA - TDI S': 17.4 CM/SEC
BILIRUB SERPL-MCNC: 0.3 MG/DL (ref 0–1.2)
BILIRUB SERPL-MCNC: 0.3 MG/DL (ref 0–1.2)
BILIRUB SERPL-MCNC: 0.4 MG/DL (ref 0–1.2)
BILIRUB UR QL STRIP: NEGATIVE
BILIRUB UR QL STRIP: NEGATIVE
BUN SERPL-MCNC: 10 MG/DL (ref 8–23)
BUN SERPL-MCNC: 10 MG/DL (ref 8–23)
BUN SERPL-MCNC: 11 MG/DL (ref 8–23)
BUN SERPL-MCNC: 11 MG/DL (ref 8–23)
BUN SERPL-MCNC: 12 MG/DL (ref 8–23)
BUN SERPL-MCNC: 12 MG/DL (ref 8–23)
BUN SERPL-MCNC: 14 MG/DL (ref 8–23)
BUN SERPL-MCNC: 15 MG/DL (ref 8–23)
BUN SERPL-MCNC: 18 MG/DL (ref 8–23)
BUN SERPL-MCNC: 22 MG/DL (ref 8–23)
BUN SERPL-MCNC: 23 MG/DL (ref 8–23)
BUN SERPL-MCNC: 27 MG/DL (ref 8–23)
BUN SERPL-MCNC: 28 MG/DL (ref 8–23)
BUN SERPL-MCNC: 34 MG/DL (ref 8–23)
BUN SERPL-MCNC: 37 MG/DL (ref 8–23)
BUN SERPL-MCNC: 6 MG/DL (ref 8–23)
BUN SERPL-MCNC: 8 MG/DL (ref 8–23)
BUN/CREAT SERPL: 11 (ref 7–25)
BUN/CREAT SERPL: 11.3 (ref 7–25)
BUN/CREAT SERPL: 11.8 (ref 7–25)
BUN/CREAT SERPL: 12.8 (ref 7–25)
BUN/CREAT SERPL: 13 (ref 7–25)
BUN/CREAT SERPL: 13.3 (ref 7–25)
BUN/CREAT SERPL: 13.5 (ref 7–25)
BUN/CREAT SERPL: 14.5 (ref 7–25)
BUN/CREAT SERPL: 18.6 (ref 7–25)
BUN/CREAT SERPL: 19 (ref 7–25)
BUN/CREAT SERPL: 19.9 (ref 7–25)
BUN/CREAT SERPL: 20.2 (ref 7–25)
BUN/CREAT SERPL: 20.5 (ref 7–25)
BUN/CREAT SERPL: 21.4 (ref 7–25)
BUN/CREAT SERPL: 22.2 (ref 7–25)
BUN/CREAT SERPL: 23 (ref 7–25)
BUN/CREAT SERPL: 7.8 (ref 7–25)
CALCIUM SPEC-SCNC: 7.7 MG/DL (ref 8.2–9.6)
CALCIUM SPEC-SCNC: 7.8 MG/DL (ref 8.2–9.6)
CALCIUM SPEC-SCNC: 7.8 MG/DL (ref 8.2–9.6)
CALCIUM SPEC-SCNC: 7.9 MG/DL (ref 8.2–9.6)
CALCIUM SPEC-SCNC: 7.9 MG/DL (ref 8.2–9.6)
CALCIUM SPEC-SCNC: 8 MG/DL (ref 8.2–9.6)
CALCIUM SPEC-SCNC: 8.1 MG/DL (ref 8.2–9.6)
CALCIUM SPEC-SCNC: 8.1 MG/DL (ref 8.2–9.6)
CALCIUM SPEC-SCNC: 8.2 MG/DL (ref 8.2–9.6)
CALCIUM SPEC-SCNC: 8.3 MG/DL (ref 8.2–9.6)
CALCIUM SPEC-SCNC: 8.3 MG/DL (ref 8.2–9.6)
CALCIUM SPEC-SCNC: 8.4 MG/DL (ref 8.2–9.6)
CALCIUM SPEC-SCNC: 8.6 MG/DL (ref 8.2–9.6)
CALCIUM SPEC-SCNC: 8.8 MG/DL (ref 8.2–9.6)
CALCIUM SPEC-SCNC: 9.1 MG/DL (ref 8.2–9.6)
CHLORIDE SERPL-SCNC: 100 MMOL/L (ref 98–107)
CHLORIDE SERPL-SCNC: 101 MMOL/L (ref 98–107)
CHLORIDE SERPL-SCNC: 101 MMOL/L (ref 98–107)
CHLORIDE SERPL-SCNC: 102 MMOL/L (ref 98–107)
CHLORIDE SERPL-SCNC: 104 MMOL/L (ref 98–107)
CHLORIDE SERPL-SCNC: 105 MMOL/L (ref 98–107)
CHLORIDE SERPL-SCNC: 105 MMOL/L (ref 98–107)
CHLORIDE SERPL-SCNC: 106 MMOL/L (ref 98–107)
CHLORIDE SERPL-SCNC: 106 MMOL/L (ref 98–107)
CHLORIDE SERPL-SCNC: 107 MMOL/L (ref 98–107)
CHLORIDE SERPL-SCNC: 108 MMOL/L (ref 98–107)
CHLORIDE SERPL-SCNC: 109 MMOL/L (ref 98–107)
CHLORIDE SERPL-SCNC: 110 MMOL/L (ref 98–107)
CHLORIDE SERPL-SCNC: 111 MMOL/L (ref 98–107)
CHLORIDE SERPL-SCNC: 112 MMOL/L (ref 98–107)
CHOLEST SERPL-MCNC: 130 MG/DL (ref 0–200)
CLARITY UR: ABNORMAL
CLARITY UR: ABNORMAL
CO2 SERPL-SCNC: 14.3 MMOL/L (ref 22–29)
CO2 SERPL-SCNC: 16 MMOL/L (ref 22–29)
CO2 SERPL-SCNC: 16.2 MMOL/L (ref 22–29)
CO2 SERPL-SCNC: 17 MMOL/L (ref 22–29)
CO2 SERPL-SCNC: 17.2 MMOL/L (ref 22–29)
CO2 SERPL-SCNC: 18.1 MMOL/L (ref 22–29)
CO2 SERPL-SCNC: 18.2 MMOL/L (ref 22–29)
CO2 SERPL-SCNC: 19 MMOL/L (ref 22–29)
CO2 SERPL-SCNC: 19.1 MMOL/L (ref 22–29)
CO2 SERPL-SCNC: 19.9 MMOL/L (ref 22–29)
CO2 SERPL-SCNC: 20 MMOL/L (ref 22–29)
CO2 SERPL-SCNC: 20 MMOL/L (ref 22–29)
CO2 SERPL-SCNC: 20.7 MMOL/L (ref 22–29)
CO2 SERPL-SCNC: 21 MMOL/L (ref 22–29)
CO2 SERPL-SCNC: 21.2 MMOL/L (ref 22–29)
CO2 SERPL-SCNC: 23 MMOL/L (ref 22–29)
CO2 SERPL-SCNC: 23 MMOL/L (ref 22–29)
COLOR UR: YELLOW
COLOR UR: YELLOW
CREAT SERPL-MCNC: 0.69 MG/DL (ref 0.57–1)
CREAT SERPL-MCNC: 0.73 MG/DL (ref 0.57–1)
CREAT SERPL-MCNC: 0.77 MG/DL (ref 0.57–1)
CREAT SERPL-MCNC: 0.78 MG/DL (ref 0.57–1)
CREAT SERPL-MCNC: 0.79 MG/DL (ref 0.57–1)
CREAT SERPL-MCNC: 0.83 MG/DL (ref 0.57–1)
CREAT SERPL-MCNC: 0.88 MG/DL (ref 0.57–1)
CREAT SERPL-MCNC: 0.92 MG/DL (ref 0.57–1)
CREAT SERPL-MCNC: 0.93 MG/DL (ref 0.57–1)
CREAT SERPL-MCNC: 1 MG/DL (ref 0.57–1)
CREAT SERPL-MCNC: 1.03 MG/DL (ref 0.57–1)
CREAT SERPL-MCNC: 1.04 MG/DL (ref 0.57–1)
CREAT SERPL-MCNC: 1.06 MG/DL (ref 0.57–1)
CREAT SERPL-MCNC: 1.26 MG/DL (ref 0.57–1)
CREAT SERPL-MCNC: 1.36 MG/DL (ref 0.57–1)
CREAT SERPL-MCNC: 1.83 MG/DL (ref 0.57–1)
CREAT SERPL-MCNC: 1.83 MG/DL (ref 0.57–1)
CRP SERPL-MCNC: 18.51 MG/DL (ref 0–0.5)
D-LACTATE SERPL-SCNC: 1.7 MMOL/L (ref 0.5–2)
DEPRECATED RDW RBC AUTO: 44.7 FL (ref 37–54)
DEPRECATED RDW RBC AUTO: 44.7 FL (ref 37–54)
DEPRECATED RDW RBC AUTO: 44.8 FL (ref 37–54)
DEPRECATED RDW RBC AUTO: 45.1 FL (ref 37–54)
DEPRECATED RDW RBC AUTO: 45.5 FL (ref 37–54)
DEPRECATED RDW RBC AUTO: 46.4 FL (ref 37–54)
DEPRECATED RDW RBC AUTO: 46.8 FL (ref 37–54)
DEPRECATED RDW RBC AUTO: 47 FL (ref 37–54)
DEPRECATED RDW RBC AUTO: 47.5 FL (ref 37–54)
DEPRECATED RDW RBC AUTO: 48 FL (ref 37–54)
DEPRECATED RDW RBC AUTO: 48.3 FL (ref 37–54)
DEPRECATED RDW RBC AUTO: 48.6 FL (ref 37–54)
DEPRECATED RDW RBC AUTO: 49.4 FL (ref 37–54)
DEPRECATED RDW RBC AUTO: 49.9 FL (ref 37–54)
DEPRECATED RDW RBC AUTO: 50.9 FL (ref 37–54)
DEPRECATED RDW RBC AUTO: 51.6 FL (ref 37–54)
DEPRECATED RDW RBC AUTO: 55.4 FL (ref 37–54)
EGFRCR SERPLBLD CKD-EPI 2021: 26 ML/MIN/1.73
EGFRCR SERPLBLD CKD-EPI 2021: 26 ML/MIN/1.73
EGFRCR SERPLBLD CKD-EPI 2021: 37.1 ML/MIN/1.73
EGFRCR SERPLBLD CKD-EPI 2021: 40.6 ML/MIN/1.73
EGFRCR SERPLBLD CKD-EPI 2021: 50 ML/MIN/1.73
EGFRCR SERPLBLD CKD-EPI 2021: 51.2 ML/MIN/1.73
EGFRCR SERPLBLD CKD-EPI 2021: 51.8 ML/MIN/1.73
EGFRCR SERPLBLD CKD-EPI 2021: 53.6 ML/MIN/1.73
EGFRCR SERPLBLD CKD-EPI 2021: 58.5 ML/MIN/1.73
EGFRCR SERPLBLD CKD-EPI 2021: 59.3 ML/MIN/1.73
EGFRCR SERPLBLD CKD-EPI 2021: 62.5 ML/MIN/1.73
EGFRCR SERPLBLD CKD-EPI 2021: 67.1 ML/MIN/1.73
EGFRCR SERPLBLD CKD-EPI 2021: 71.2 ML/MIN/1.73
EGFRCR SERPLBLD CKD-EPI 2021: 72.3 ML/MIN/1.73
EGFRCR SERPLBLD CKD-EPI 2021: 73.4 ML/MIN/1.73
EGFRCR SERPLBLD CKD-EPI 2021: 78.2 ML/MIN/1.73
EGFRCR SERPLBLD CKD-EPI 2021: 82.6 ML/MIN/1.73
EOSINOPHIL # BLD AUTO: 0 10*3/MM3 (ref 0–0.4)
EOSINOPHIL # BLD AUTO: 0.01 10*3/MM3 (ref 0–0.4)
EOSINOPHIL # BLD AUTO: 0.01 10*3/MM3 (ref 0–0.4)
EOSINOPHIL # BLD AUTO: 0.04 10*3/MM3 (ref 0–0.4)
EOSINOPHIL # BLD AUTO: 0.05 10*3/MM3 (ref 0–0.4)
EOSINOPHIL # BLD AUTO: 0.08 10*3/MM3 (ref 0–0.4)
EOSINOPHIL # BLD AUTO: 0.09 10*3/MM3 (ref 0–0.4)
EOSINOPHIL # BLD AUTO: 0.09 10*3/MM3 (ref 0–0.4)
EOSINOPHIL # BLD AUTO: 0.1 10*3/MM3 (ref 0–0.4)
EOSINOPHIL # BLD AUTO: 0.1 10*3/MM3 (ref 0–0.4)
EOSINOPHIL # BLD AUTO: 0.12 10*3/MM3 (ref 0–0.4)
EOSINOPHIL # BLD AUTO: 0.14 10*3/MM3 (ref 0–0.4)
EOSINOPHIL # BLD AUTO: 0.14 10*3/MM3 (ref 0–0.4)
EOSINOPHIL # BLD AUTO: 0.15 10*3/MM3 (ref 0–0.4)
EOSINOPHIL # BLD AUTO: 0.16 10*3/MM3 (ref 0–0.4)
EOSINOPHIL # BLD AUTO: 0.17 10*3/MM3 (ref 0–0.4)
EOSINOPHIL NFR BLD AUTO: 0 % (ref 0.3–6.2)
EOSINOPHIL NFR BLD AUTO: 0.1 % (ref 0.3–6.2)
EOSINOPHIL NFR BLD AUTO: 0.1 % (ref 0.3–6.2)
EOSINOPHIL NFR BLD AUTO: 0.2 % (ref 0.3–6.2)
EOSINOPHIL NFR BLD AUTO: 0.4 % (ref 0.3–6.2)
EOSINOPHIL NFR BLD AUTO: 0.5 % (ref 0.3–6.2)
EOSINOPHIL NFR BLD AUTO: 0.5 % (ref 0.3–6.2)
EOSINOPHIL NFR BLD AUTO: 0.7 % (ref 0.3–6.2)
EOSINOPHIL NFR BLD AUTO: 0.8 % (ref 0.3–6.2)
EOSINOPHIL NFR BLD AUTO: 1 % (ref 0.3–6.2)
EOSINOPHIL NFR BLD AUTO: 1.1 % (ref 0.3–6.2)
EOSINOPHIL NFR BLD AUTO: 1.2 % (ref 0.3–6.2)
EOSINOPHIL NFR BLD AUTO: 1.3 % (ref 0.3–6.2)
EOSINOPHIL NFR BLD AUTO: 1.4 % (ref 0.3–6.2)
EOSINOPHIL NFR BLD AUTO: 1.7 % (ref 0.3–6.2)
EOSINOPHIL NFR BLD AUTO: 1.9 % (ref 0.3–6.2)
ERYTHROCYTE [DISTWIDTH] IN BLOOD BY AUTOMATED COUNT: 13.7 % (ref 12.3–15.4)
ERYTHROCYTE [DISTWIDTH] IN BLOOD BY AUTOMATED COUNT: 13.8 % (ref 12.3–15.4)
ERYTHROCYTE [DISTWIDTH] IN BLOOD BY AUTOMATED COUNT: 13.9 % (ref 12.3–15.4)
ERYTHROCYTE [DISTWIDTH] IN BLOOD BY AUTOMATED COUNT: 13.9 % (ref 12.3–15.4)
ERYTHROCYTE [DISTWIDTH] IN BLOOD BY AUTOMATED COUNT: 14.2 % (ref 12.3–15.4)
ERYTHROCYTE [DISTWIDTH] IN BLOOD BY AUTOMATED COUNT: 14.4 % (ref 12.3–15.4)
ERYTHROCYTE [DISTWIDTH] IN BLOOD BY AUTOMATED COUNT: 14.5 % (ref 12.3–15.4)
ERYTHROCYTE [DISTWIDTH] IN BLOOD BY AUTOMATED COUNT: 14.6 % (ref 12.3–15.4)
ERYTHROCYTE [DISTWIDTH] IN BLOOD BY AUTOMATED COUNT: 14.7 % (ref 12.3–15.4)
ERYTHROCYTE [DISTWIDTH] IN BLOOD BY AUTOMATED COUNT: 14.8 % (ref 12.3–15.4)
ERYTHROCYTE [DISTWIDTH] IN BLOOD BY AUTOMATED COUNT: 15.1 % (ref 12.3–15.4)
ERYTHROCYTE [DISTWIDTH] IN BLOOD BY AUTOMATED COUNT: 15.1 % (ref 12.3–15.4)
ERYTHROCYTE [DISTWIDTH] IN BLOOD BY AUTOMATED COUNT: 15.4 % (ref 12.3–15.4)
ERYTHROCYTE [DISTWIDTH] IN BLOOD BY AUTOMATED COUNT: 15.7 % (ref 12.3–15.4)
ERYTHROCYTE [DISTWIDTH] IN BLOOD BY AUTOMATED COUNT: 16.7 % (ref 12.3–15.4)
GLOBULIN UR ELPH-MCNC: 3.3 GM/DL
GLOBULIN UR ELPH-MCNC: 3.6 GM/DL
GLOBULIN UR ELPH-MCNC: 3.7 GM/DL
GLOBULIN UR ELPH-MCNC: 4 GM/DL
GLOBULIN UR ELPH-MCNC: 4.5 GM/DL
GLUCOSE BLDC GLUCOMTR-MCNC: 101 MG/DL (ref 70–130)
GLUCOSE BLDC GLUCOMTR-MCNC: 103 MG/DL (ref 70–130)
GLUCOSE BLDC GLUCOMTR-MCNC: 106 MG/DL (ref 70–130)
GLUCOSE BLDC GLUCOMTR-MCNC: 106 MG/DL (ref 70–130)
GLUCOSE BLDC GLUCOMTR-MCNC: 110 MG/DL (ref 70–130)
GLUCOSE BLDC GLUCOMTR-MCNC: 115 MG/DL (ref 70–130)
GLUCOSE BLDC GLUCOMTR-MCNC: 116 MG/DL (ref 70–130)
GLUCOSE BLDC GLUCOMTR-MCNC: 120 MG/DL (ref 70–130)
GLUCOSE BLDC GLUCOMTR-MCNC: 121 MG/DL (ref 70–130)
GLUCOSE BLDC GLUCOMTR-MCNC: 125 MG/DL (ref 70–130)
GLUCOSE BLDC GLUCOMTR-MCNC: 139 MG/DL (ref 70–130)
GLUCOSE BLDC GLUCOMTR-MCNC: 145 MG/DL (ref 70–130)
GLUCOSE BLDC GLUCOMTR-MCNC: 147 MG/DL (ref 70–130)
GLUCOSE BLDC GLUCOMTR-MCNC: 147 MG/DL (ref 70–130)
GLUCOSE BLDC GLUCOMTR-MCNC: 150 MG/DL (ref 70–130)
GLUCOSE BLDC GLUCOMTR-MCNC: 152 MG/DL (ref 70–130)
GLUCOSE BLDC GLUCOMTR-MCNC: 155 MG/DL (ref 70–130)
GLUCOSE BLDC GLUCOMTR-MCNC: 156 MG/DL (ref 70–130)
GLUCOSE BLDC GLUCOMTR-MCNC: 156 MG/DL (ref 70–130)
GLUCOSE BLDC GLUCOMTR-MCNC: 160 MG/DL (ref 70–130)
GLUCOSE BLDC GLUCOMTR-MCNC: 160 MG/DL (ref 70–130)
GLUCOSE BLDC GLUCOMTR-MCNC: 161 MG/DL (ref 70–130)
GLUCOSE BLDC GLUCOMTR-MCNC: 162 MG/DL (ref 70–130)
GLUCOSE BLDC GLUCOMTR-MCNC: 162 MG/DL (ref 70–130)
GLUCOSE BLDC GLUCOMTR-MCNC: 166 MG/DL (ref 70–130)
GLUCOSE BLDC GLUCOMTR-MCNC: 172 MG/DL (ref 70–130)
GLUCOSE BLDC GLUCOMTR-MCNC: 172 MG/DL (ref 70–130)
GLUCOSE BLDC GLUCOMTR-MCNC: 173 MG/DL (ref 70–130)
GLUCOSE BLDC GLUCOMTR-MCNC: 186 MG/DL (ref 70–130)
GLUCOSE BLDC GLUCOMTR-MCNC: 187 MG/DL (ref 70–130)
GLUCOSE BLDC GLUCOMTR-MCNC: 189 MG/DL (ref 70–130)
GLUCOSE BLDC GLUCOMTR-MCNC: 202 MG/DL (ref 70–130)
GLUCOSE BLDC GLUCOMTR-MCNC: 202 MG/DL (ref 70–130)
GLUCOSE BLDC GLUCOMTR-MCNC: 210 MG/DL (ref 70–130)
GLUCOSE BLDC GLUCOMTR-MCNC: 210 MG/DL (ref 70–130)
GLUCOSE BLDC GLUCOMTR-MCNC: 213 MG/DL (ref 70–130)
GLUCOSE BLDC GLUCOMTR-MCNC: 217 MG/DL (ref 70–130)
GLUCOSE BLDC GLUCOMTR-MCNC: 223 MG/DL (ref 70–130)
GLUCOSE BLDC GLUCOMTR-MCNC: 226 MG/DL (ref 70–130)
GLUCOSE BLDC GLUCOMTR-MCNC: 239 MG/DL (ref 70–130)
GLUCOSE BLDC GLUCOMTR-MCNC: 242 MG/DL (ref 70–130)
GLUCOSE BLDC GLUCOMTR-MCNC: 243 MG/DL (ref 70–130)
GLUCOSE BLDC GLUCOMTR-MCNC: 246 MG/DL (ref 70–130)
GLUCOSE BLDC GLUCOMTR-MCNC: 250 MG/DL (ref 70–130)
GLUCOSE BLDC GLUCOMTR-MCNC: 251 MG/DL (ref 70–130)
GLUCOSE BLDC GLUCOMTR-MCNC: 302 MG/DL (ref 70–130)
GLUCOSE BLDC GLUCOMTR-MCNC: 37 MG/DL (ref 70–130)
GLUCOSE BLDC GLUCOMTR-MCNC: 384 MG/DL (ref 70–130)
GLUCOSE BLDC GLUCOMTR-MCNC: 60 MG/DL (ref 70–130)
GLUCOSE BLDC GLUCOMTR-MCNC: 64 MG/DL (ref 70–130)
GLUCOSE BLDC GLUCOMTR-MCNC: 70 MG/DL (ref 70–130)
GLUCOSE BLDC GLUCOMTR-MCNC: 73 MG/DL (ref 70–130)
GLUCOSE BLDC GLUCOMTR-MCNC: 75 MG/DL (ref 70–130)
GLUCOSE BLDC GLUCOMTR-MCNC: 79 MG/DL (ref 70–130)
GLUCOSE BLDC GLUCOMTR-MCNC: 81 MG/DL (ref 70–130)
GLUCOSE BLDC GLUCOMTR-MCNC: 88 MG/DL (ref 70–130)
GLUCOSE BLDC GLUCOMTR-MCNC: 88 MG/DL (ref 70–130)
GLUCOSE BLDC GLUCOMTR-MCNC: 95 MG/DL (ref 70–130)
GLUCOSE BLDC GLUCOMTR-MCNC: 97 MG/DL (ref 70–130)
GLUCOSE BLDC GLUCOMTR-MCNC: 97 MG/DL (ref 70–130)
GLUCOSE BLDC GLUCOMTR-MCNC: 99 MG/DL (ref 70–130)
GLUCOSE SERPL-MCNC: 102 MG/DL (ref 65–99)
GLUCOSE SERPL-MCNC: 107 MG/DL (ref 65–99)
GLUCOSE SERPL-MCNC: 110 MG/DL (ref 65–99)
GLUCOSE SERPL-MCNC: 142 MG/DL (ref 65–99)
GLUCOSE SERPL-MCNC: 147 MG/DL (ref 65–99)
GLUCOSE SERPL-MCNC: 148 MG/DL (ref 65–99)
GLUCOSE SERPL-MCNC: 155 MG/DL (ref 65–99)
GLUCOSE SERPL-MCNC: 162 MG/DL (ref 65–99)
GLUCOSE SERPL-MCNC: 163 MG/DL (ref 65–99)
GLUCOSE SERPL-MCNC: 193 MG/DL (ref 65–99)
GLUCOSE SERPL-MCNC: 203 MG/DL (ref 65–99)
GLUCOSE SERPL-MCNC: 214 MG/DL (ref 65–99)
GLUCOSE SERPL-MCNC: 256 MG/DL (ref 65–99)
GLUCOSE SERPL-MCNC: 58 MG/DL (ref 65–99)
GLUCOSE SERPL-MCNC: 68 MG/DL (ref 65–99)
GLUCOSE SERPL-MCNC: 71 MG/DL (ref 65–99)
GLUCOSE SERPL-MCNC: 78 MG/DL (ref 65–99)
GLUCOSE UR STRIP-MCNC: NEGATIVE MG/DL
GLUCOSE UR STRIP-MCNC: NEGATIVE MG/DL
HBA1C MFR BLD: 6.9 % (ref 4.8–5.6)
HCT VFR BLD AUTO: 29.2 % (ref 34–46.6)
HCT VFR BLD AUTO: 29.7 % (ref 34–46.6)
HCT VFR BLD AUTO: 30.1 % (ref 34–46.6)
HCT VFR BLD AUTO: 31 % (ref 34–46.6)
HCT VFR BLD AUTO: 31.1 % (ref 34–46.6)
HCT VFR BLD AUTO: 31.6 % (ref 34–46.6)
HCT VFR BLD AUTO: 31.7 % (ref 34–46.6)
HCT VFR BLD AUTO: 32.1 % (ref 34–46.6)
HCT VFR BLD AUTO: 32.4 % (ref 34–46.6)
HCT VFR BLD AUTO: 33.2 % (ref 34–46.6)
HCT VFR BLD AUTO: 33.3 % (ref 34–46.6)
HCT VFR BLD AUTO: 33.4 % (ref 34–46.6)
HCT VFR BLD AUTO: 33.7 % (ref 34–46.6)
HCT VFR BLD AUTO: 34.7 % (ref 34–46.6)
HCT VFR BLD AUTO: 35.2 % (ref 34–46.6)
HCT VFR BLD AUTO: 35.9 % (ref 34–46.6)
HCT VFR BLD AUTO: 42.1 % (ref 34–46.6)
HDLC SERPL-MCNC: 39 MG/DL (ref 40–60)
HGB BLD-MCNC: 10 G/DL (ref 12–15.9)
HGB BLD-MCNC: 10.1 G/DL (ref 12–15.9)
HGB BLD-MCNC: 10.1 G/DL (ref 12–15.9)
HGB BLD-MCNC: 10.2 G/DL (ref 12–15.9)
HGB BLD-MCNC: 10.2 G/DL (ref 12–15.9)
HGB BLD-MCNC: 10.3 G/DL (ref 12–15.9)
HGB BLD-MCNC: 10.5 G/DL (ref 12–15.9)
HGB BLD-MCNC: 10.6 G/DL (ref 12–15.9)
HGB BLD-MCNC: 10.9 G/DL (ref 12–15.9)
HGB BLD-MCNC: 11.2 G/DL (ref 12–15.9)
HGB BLD-MCNC: 11.2 G/DL (ref 12–15.9)
HGB BLD-MCNC: 11.6 G/DL (ref 12–15.9)
HGB BLD-MCNC: 13.6 G/DL (ref 12–15.9)
HGB BLD-MCNC: 9.8 G/DL (ref 12–15.9)
HGB BLD-MCNC: 9.8 G/DL (ref 12–15.9)
HGB UR QL STRIP.AUTO: ABNORMAL
HGB UR QL STRIP.AUTO: NEGATIVE
HOLD SPECIMEN: NORMAL
HOLD SPECIMEN: NORMAL
HYALINE CASTS UR QL AUTO: ABNORMAL /LPF
HYALINE CASTS UR QL AUTO: ABNORMAL /LPF
IMM GRANULOCYTES # BLD AUTO: 0.04 10*3/MM3 (ref 0–0.05)
IMM GRANULOCYTES # BLD AUTO: 0.05 10*3/MM3 (ref 0–0.05)
IMM GRANULOCYTES # BLD AUTO: 0.06 10*3/MM3 (ref 0–0.05)
IMM GRANULOCYTES # BLD AUTO: 0.08 10*3/MM3 (ref 0–0.05)
IMM GRANULOCYTES # BLD AUTO: 0.09 10*3/MM3 (ref 0–0.05)
IMM GRANULOCYTES # BLD AUTO: 0.1 10*3/MM3 (ref 0–0.05)
IMM GRANULOCYTES # BLD AUTO: 0.11 10*3/MM3 (ref 0–0.05)
IMM GRANULOCYTES # BLD AUTO: 0.14 10*3/MM3 (ref 0–0.05)
IMM GRANULOCYTES # BLD AUTO: 0.23 10*3/MM3 (ref 0–0.05)
IMM GRANULOCYTES NFR BLD AUTO: 0.3 % (ref 0–0.5)
IMM GRANULOCYTES NFR BLD AUTO: 0.4 % (ref 0–0.5)
IMM GRANULOCYTES NFR BLD AUTO: 0.5 % (ref 0–0.5)
IMM GRANULOCYTES NFR BLD AUTO: 0.6 % (ref 0–0.5)
IMM GRANULOCYTES NFR BLD AUTO: 0.7 % (ref 0–0.5)
IMM GRANULOCYTES NFR BLD AUTO: 0.7 % (ref 0–0.5)
IMM GRANULOCYTES NFR BLD AUTO: 1.1 % (ref 0–0.5)
INR PPP: 1.29 (ref 0.9–1.1)
KETONES UR QL STRIP: NEGATIVE
KETONES UR QL STRIP: NEGATIVE
LDLC SERPL CALC-MCNC: 71 MG/DL (ref 0–100)
LDLC/HDLC SERPL: 1.77 {RATIO}
LEFT ATRIUM VOLUME INDEX: 33.8 ML/M2
LEUKOCYTE ESTERASE UR QL STRIP.AUTO: ABNORMAL
LEUKOCYTE ESTERASE UR QL STRIP.AUTO: ABNORMAL
LIPASE SERPL-CCNC: 36 U/L (ref 13–60)
LIPASE SERPL-CCNC: 54 U/L (ref 13–60)
LIPASE SERPL-CCNC: 82 U/L (ref 13–60)
LYMPHOCYTES # BLD AUTO: 1.43 10*3/MM3 (ref 0.7–3.1)
LYMPHOCYTES # BLD AUTO: 1.76 10*3/MM3 (ref 0.7–3.1)
LYMPHOCYTES # BLD AUTO: 1.78 10*3/MM3 (ref 0.7–3.1)
LYMPHOCYTES # BLD AUTO: 1.85 10*3/MM3 (ref 0.7–3.1)
LYMPHOCYTES # BLD AUTO: 1.88 10*3/MM3 (ref 0.7–3.1)
LYMPHOCYTES # BLD AUTO: 2.2 10*3/MM3 (ref 0.7–3.1)
LYMPHOCYTES # BLD AUTO: 2.3 10*3/MM3 (ref 0.7–3.1)
LYMPHOCYTES # BLD AUTO: 2.3 10*3/MM3 (ref 0.7–3.1)
LYMPHOCYTES # BLD AUTO: 2.69 10*3/MM3 (ref 0.7–3.1)
LYMPHOCYTES # BLD AUTO: 2.77 10*3/MM3 (ref 0.7–3.1)
LYMPHOCYTES # BLD AUTO: 2.88 10*3/MM3 (ref 0.7–3.1)
LYMPHOCYTES # BLD AUTO: 2.98 10*3/MM3 (ref 0.7–3.1)
LYMPHOCYTES # BLD AUTO: 3.03 10*3/MM3 (ref 0.7–3.1)
LYMPHOCYTES # BLD AUTO: 3.05 10*3/MM3 (ref 0.7–3.1)
LYMPHOCYTES # BLD AUTO: 3.55 10*3/MM3 (ref 0.7–3.1)
LYMPHOCYTES # BLD AUTO: 3.59 10*3/MM3 (ref 0.7–3.1)
LYMPHOCYTES # BLD MANUAL: 2.97 10*3/MM3 (ref 0.7–3.1)
LYMPHOCYTES NFR BLD AUTO: 15.3 % (ref 19.6–45.3)
LYMPHOCYTES NFR BLD AUTO: 17 % (ref 19.6–45.3)
LYMPHOCYTES NFR BLD AUTO: 17.3 % (ref 19.6–45.3)
LYMPHOCYTES NFR BLD AUTO: 17.8 % (ref 19.6–45.3)
LYMPHOCYTES NFR BLD AUTO: 19.2 % (ref 19.6–45.3)
LYMPHOCYTES NFR BLD AUTO: 19.4 % (ref 19.6–45.3)
LYMPHOCYTES NFR BLD AUTO: 21 % (ref 19.6–45.3)
LYMPHOCYTES NFR BLD AUTO: 23.2 % (ref 19.6–45.3)
LYMPHOCYTES NFR BLD AUTO: 24.9 % (ref 19.6–45.3)
LYMPHOCYTES NFR BLD AUTO: 29.8 % (ref 19.6–45.3)
LYMPHOCYTES NFR BLD AUTO: 32.4 % (ref 19.6–45.3)
LYMPHOCYTES NFR BLD AUTO: 33.2 % (ref 19.6–45.3)
LYMPHOCYTES NFR BLD AUTO: 37.1 % (ref 19.6–45.3)
LYMPHOCYTES NFR BLD AUTO: 7 % (ref 19.6–45.3)
LYMPHOCYTES NFR BLD AUTO: 9.3 % (ref 19.6–45.3)
LYMPHOCYTES NFR BLD AUTO: 9.5 % (ref 19.6–45.3)
LYMPHOCYTES NFR BLD MANUAL: 11.1 % (ref 5–12)
MAXIMAL PREDICTED HEART RATE: 130 BPM
MCH RBC QN AUTO: 28.4 PG (ref 26.6–33)
MCH RBC QN AUTO: 28.7 PG (ref 26.6–33)
MCH RBC QN AUTO: 28.8 PG (ref 26.6–33)
MCH RBC QN AUTO: 28.9 PG (ref 26.6–33)
MCH RBC QN AUTO: 29 PG (ref 26.6–33)
MCH RBC QN AUTO: 29.1 PG (ref 26.6–33)
MCH RBC QN AUTO: 29.2 PG (ref 26.6–33)
MCH RBC QN AUTO: 29.2 PG (ref 26.6–33)
MCH RBC QN AUTO: 29.3 PG (ref 26.6–33)
MCH RBC QN AUTO: 29.3 PG (ref 26.6–33)
MCH RBC QN AUTO: 29.4 PG (ref 26.6–33)
MCHC RBC AUTO-ENTMCNC: 31.8 G/DL (ref 31.5–35.7)
MCHC RBC AUTO-ENTMCNC: 31.8 G/DL (ref 31.5–35.7)
MCHC RBC AUTO-ENTMCNC: 31.9 G/DL (ref 31.5–35.7)
MCHC RBC AUTO-ENTMCNC: 31.9 G/DL (ref 31.5–35.7)
MCHC RBC AUTO-ENTMCNC: 32.3 G/DL (ref 31.5–35.7)
MCHC RBC AUTO-ENTMCNC: 32.5 G/DL (ref 31.5–35.7)
MCHC RBC AUTO-ENTMCNC: 32.6 G/DL (ref 31.5–35.7)
MCHC RBC AUTO-ENTMCNC: 32.7 G/DL (ref 31.5–35.7)
MCHC RBC AUTO-ENTMCNC: 32.7 G/DL (ref 31.5–35.7)
MCHC RBC AUTO-ENTMCNC: 32.9 G/DL (ref 31.5–35.7)
MCHC RBC AUTO-ENTMCNC: 33 G/DL (ref 31.5–35.7)
MCHC RBC AUTO-ENTMCNC: 33.2 G/DL (ref 31.5–35.7)
MCHC RBC AUTO-ENTMCNC: 33.6 G/DL (ref 31.5–35.7)
MCV RBC AUTO: 86.9 FL (ref 79–97)
MCV RBC AUTO: 88 FL (ref 79–97)
MCV RBC AUTO: 88.1 FL (ref 79–97)
MCV RBC AUTO: 88.3 FL (ref 79–97)
MCV RBC AUTO: 88.6 FL (ref 79–97)
MCV RBC AUTO: 88.8 FL (ref 79–97)
MCV RBC AUTO: 88.9 FL (ref 79–97)
MCV RBC AUTO: 89 FL (ref 79–97)
MCV RBC AUTO: 89.8 FL (ref 79–97)
MCV RBC AUTO: 89.9 FL (ref 79–97)
MCV RBC AUTO: 90.1 FL (ref 79–97)
MCV RBC AUTO: 90.1 FL (ref 79–97)
MCV RBC AUTO: 90.2 FL (ref 79–97)
MCV RBC AUTO: 90.5 FL (ref 79–97)
MCV RBC AUTO: 91 FL (ref 79–97)
METAMYELOCYTES NFR BLD MANUAL: 2 % (ref 0–0)
MONOCYTES # BLD AUTO: 0.85 10*3/MM3 (ref 0.1–0.9)
MONOCYTES # BLD AUTO: 0.9 10*3/MM3 (ref 0.1–0.9)
MONOCYTES # BLD AUTO: 1 10*3/MM3 (ref 0.1–0.9)
MONOCYTES # BLD AUTO: 1.04 10*3/MM3 (ref 0.1–0.9)
MONOCYTES # BLD AUTO: 1.08 10*3/MM3 (ref 0.1–0.9)
MONOCYTES # BLD AUTO: 1.09 10*3/MM3 (ref 0.1–0.9)
MONOCYTES # BLD AUTO: 1.1 10*3/MM3 (ref 0.1–0.9)
MONOCYTES # BLD AUTO: 1.17 10*3/MM3 (ref 0.1–0.9)
MONOCYTES # BLD AUTO: 1.27 10*3/MM3 (ref 0.1–0.9)
MONOCYTES # BLD AUTO: 1.33 10*3/MM3 (ref 0.1–0.9)
MONOCYTES # BLD AUTO: 1.33 10*3/MM3 (ref 0.1–0.9)
MONOCYTES # BLD AUTO: 1.4 10*3/MM3 (ref 0.1–0.9)
MONOCYTES # BLD AUTO: 1.49 10*3/MM3 (ref 0.1–0.9)
MONOCYTES # BLD AUTO: 1.51 10*3/MM3 (ref 0.1–0.9)
MONOCYTES # BLD AUTO: 1.67 10*3/MM3 (ref 0.1–0.9)
MONOCYTES # BLD AUTO: 1.76 10*3/MM3 (ref 0.1–0.9)
MONOCYTES # BLD: 2.97 10*3/MM3 (ref 0.1–0.9)
MONOCYTES NFR BLD AUTO: 10 % (ref 5–12)
MONOCYTES NFR BLD AUTO: 11.2 % (ref 5–12)
MONOCYTES NFR BLD AUTO: 11.2 % (ref 5–12)
MONOCYTES NFR BLD AUTO: 11.5 % (ref 5–12)
MONOCYTES NFR BLD AUTO: 11.8 % (ref 5–12)
MONOCYTES NFR BLD AUTO: 12.1 % (ref 5–12)
MONOCYTES NFR BLD AUTO: 12.4 % (ref 5–12)
MONOCYTES NFR BLD AUTO: 6.8 % (ref 5–12)
MONOCYTES NFR BLD AUTO: 6.9 % (ref 5–12)
MONOCYTES NFR BLD AUTO: 7.5 % (ref 5–12)
MONOCYTES NFR BLD AUTO: 7.8 % (ref 5–12)
MONOCYTES NFR BLD AUTO: 9 % (ref 5–12)
MONOCYTES NFR BLD AUTO: 9.3 % (ref 5–12)
MONOCYTES NFR BLD AUTO: 9.4 % (ref 5–12)
MONOCYTES NFR BLD AUTO: 9.5 % (ref 5–12)
MONOCYTES NFR BLD AUTO: 9.5 % (ref 5–12)
MRSA DNA SPEC QL NAA+PROBE: ABNORMAL
MYELOCYTES NFR BLD MANUAL: 1 % (ref 0–0)
NEUTROPHILS # BLD AUTO: 20.02 10*3/MM3 (ref 1.7–7)
NEUTROPHILS NFR BLD AUTO: 13.04 10*3/MM3 (ref 1.7–7)
NEUTROPHILS NFR BLD AUTO: 15 10*3/MM3 (ref 1.7–7)
NEUTROPHILS NFR BLD AUTO: 16.4 10*3/MM3 (ref 1.7–7)
NEUTROPHILS NFR BLD AUTO: 17.27 10*3/MM3 (ref 1.7–7)
NEUTROPHILS NFR BLD AUTO: 4.73 10*3/MM3 (ref 1.7–7)
NEUTROPHILS NFR BLD AUTO: 4.74 10*3/MM3 (ref 1.7–7)
NEUTROPHILS NFR BLD AUTO: 48.9 % (ref 42.7–76)
NEUTROPHILS NFR BLD AUTO: 5.68 10*3/MM3 (ref 1.7–7)
NEUTROPHILS NFR BLD AUTO: 52 % (ref 42.7–76)
NEUTROPHILS NFR BLD AUTO: 55.4 % (ref 42.7–76)
NEUTROPHILS NFR BLD AUTO: 55.8 % (ref 42.7–76)
NEUTROPHILS NFR BLD AUTO: 6.12 10*3/MM3 (ref 1.7–7)
NEUTROPHILS NFR BLD AUTO: 6.37 10*3/MM3 (ref 1.7–7)
NEUTROPHILS NFR BLD AUTO: 62 % (ref 42.7–76)
NEUTROPHILS NFR BLD AUTO: 62.9 % (ref 42.7–76)
NEUTROPHILS NFR BLD AUTO: 68.3 % (ref 42.7–76)
NEUTROPHILS NFR BLD AUTO: 69.5 % (ref 42.7–76)
NEUTROPHILS NFR BLD AUTO: 7.15 10*3/MM3 (ref 1.7–7)
NEUTROPHILS NFR BLD AUTO: 7.74 10*3/MM3 (ref 1.7–7)
NEUTROPHILS NFR BLD AUTO: 71.1 % (ref 42.7–76)
NEUTROPHILS NFR BLD AUTO: 71.3 % (ref 42.7–76)
NEUTROPHILS NFR BLD AUTO: 72.6 % (ref 42.7–76)
NEUTROPHILS NFR BLD AUTO: 73.7 % (ref 42.7–76)
NEUTROPHILS NFR BLD AUTO: 74.3 % (ref 42.7–76)
NEUTROPHILS NFR BLD AUTO: 79.8 % (ref 42.7–76)
NEUTROPHILS NFR BLD AUTO: 8.07 10*3/MM3 (ref 1.7–7)
NEUTROPHILS NFR BLD AUTO: 8.08 10*3/MM3 (ref 1.7–7)
NEUTROPHILS NFR BLD AUTO: 81.8 % (ref 42.7–76)
NEUTROPHILS NFR BLD AUTO: 84.6 % (ref 42.7–76)
NEUTROPHILS NFR BLD AUTO: 9.41 10*3/MM3 (ref 1.7–7)
NEUTROPHILS NFR BLD AUTO: 9.43 10*3/MM3 (ref 1.7–7)
NEUTROPHILS NFR BLD AUTO: 9.5 10*3/MM3 (ref 1.7–7)
NEUTROPHILS NFR BLD MANUAL: 74.7 % (ref 42.7–76)
NITRITE UR QL STRIP: NEGATIVE
NITRITE UR QL STRIP: NEGATIVE
NRBC BLD AUTO-RTO: 0 /100 WBC (ref 0–0.2)
NRBC BLD AUTO-RTO: 0.1 /100 WBC (ref 0–0.2)
NRBC BLD AUTO-RTO: 0.2 /100 WBC (ref 0–0.2)
NRBC BLD AUTO-RTO: 0.2 /100 WBC (ref 0–0.2)
NRBC BLD AUTO-RTO: 0.6 /100 WBC (ref 0–0.2)
NRBC BLD AUTO-RTO: 1 /100 WBC (ref 0–0.2)
NT-PROBNP SERPL-MCNC: 1673 PG/ML (ref 0–1800)
PH UR STRIP.AUTO: <=5 [PH] (ref 5–8)
PH UR STRIP.AUTO: <=5 [PH] (ref 5–8)
PLAT MORPH BLD: NORMAL
PLATELET # BLD AUTO: 307 10*3/MM3 (ref 140–450)
PLATELET # BLD AUTO: 332 10*3/MM3 (ref 140–450)
PLATELET # BLD AUTO: 341 10*3/MM3 (ref 140–450)
PLATELET # BLD AUTO: 351 10*3/MM3 (ref 140–450)
PLATELET # BLD AUTO: 360 10*3/MM3 (ref 140–450)
PLATELET # BLD AUTO: 366 10*3/MM3 (ref 140–450)
PLATELET # BLD AUTO: 387 10*3/MM3 (ref 140–450)
PLATELET # BLD AUTO: 388 10*3/MM3 (ref 140–450)
PLATELET # BLD AUTO: 398 10*3/MM3 (ref 140–450)
PLATELET # BLD AUTO: 404 10*3/MM3 (ref 140–450)
PLATELET # BLD AUTO: 415 10*3/MM3 (ref 140–450)
PLATELET # BLD AUTO: 437 10*3/MM3 (ref 140–450)
PLATELET # BLD AUTO: 438 10*3/MM3 (ref 140–450)
PLATELET # BLD AUTO: 443 10*3/MM3 (ref 140–450)
PLATELET # BLD AUTO: 461 10*3/MM3 (ref 140–450)
PLATELET # BLD AUTO: 469 10*3/MM3 (ref 140–450)
PLATELET # BLD AUTO: 473 10*3/MM3 (ref 140–450)
PMV BLD AUTO: 10 FL (ref 6–12)
PMV BLD AUTO: 10.1 FL (ref 6–12)
PMV BLD AUTO: 10.2 FL (ref 6–12)
PMV BLD AUTO: 10.4 FL (ref 6–12)
PMV BLD AUTO: 10.5 FL (ref 6–12)
PMV BLD AUTO: 9.3 FL (ref 6–12)
PMV BLD AUTO: 9.7 FL (ref 6–12)
PMV BLD AUTO: 9.8 FL (ref 6–12)
PMV BLD AUTO: 9.9 FL (ref 6–12)
POLYCHROMASIA BLD QL SMEAR: ABNORMAL
POTASSIUM SERPL-SCNC: 3.1 MMOL/L (ref 3.5–5.2)
POTASSIUM SERPL-SCNC: 3.2 MMOL/L (ref 3.5–5.2)
POTASSIUM SERPL-SCNC: 3.2 MMOL/L (ref 3.5–5.2)
POTASSIUM SERPL-SCNC: 3.6 MMOL/L (ref 3.5–5.2)
POTASSIUM SERPL-SCNC: 3.8 MMOL/L (ref 3.5–5.2)
POTASSIUM SERPL-SCNC: 3.8 MMOL/L (ref 3.5–5.2)
POTASSIUM SERPL-SCNC: 3.9 MMOL/L (ref 3.5–5.2)
POTASSIUM SERPL-SCNC: 4 MMOL/L (ref 3.5–5.2)
POTASSIUM SERPL-SCNC: 4.1 MMOL/L (ref 3.5–5.2)
POTASSIUM SERPL-SCNC: 4.3 MMOL/L (ref 3.5–5.2)
POTASSIUM SERPL-SCNC: 4.4 MMOL/L (ref 3.5–5.2)
POTASSIUM SERPL-SCNC: 4.4 MMOL/L (ref 3.5–5.2)
POTASSIUM SERPL-SCNC: 4.6 MMOL/L (ref 3.5–5.2)
POTASSIUM SERPL-SCNC: 4.7 MMOL/L (ref 3.5–5.2)
POTASSIUM SERPL-SCNC: 5 MMOL/L (ref 3.5–5.2)
POTASSIUM SERPL-SCNC: 5.1 MMOL/L (ref 3.5–5.2)
POTASSIUM SERPL-SCNC: 5.2 MMOL/L (ref 3.5–5.2)
PROCALCITONIN SERPL-MCNC: 3.4 NG/ML (ref 0–0.25)
PROT SERPL-MCNC: 5.5 G/DL (ref 6–8.5)
PROT SERPL-MCNC: 5.7 G/DL (ref 6–8.5)
PROT SERPL-MCNC: 6.2 G/DL (ref 6–8.5)
PROT SERPL-MCNC: 6.3 G/DL (ref 6–8.5)
PROT SERPL-MCNC: 7.4 G/DL (ref 6–8.5)
PROT UR QL STRIP: ABNORMAL
PROT UR QL STRIP: ABNORMAL
PROTHROMBIN TIME: 16.1 SECONDS (ref 11.7–14.2)
QT INTERVAL: 326 MS
QT INTERVAL: 462 MS
RBC # BLD AUTO: 3.36 10*6/MM3 (ref 3.77–5.28)
RBC # BLD AUTO: 3.37 10*6/MM3 (ref 3.77–5.28)
RBC # BLD AUTO: 3.42 10*6/MM3 (ref 3.77–5.28)
RBC # BLD AUTO: 3.45 10*6/MM3 (ref 3.77–5.28)
RBC # BLD AUTO: 3.51 10*6/MM3 (ref 3.77–5.28)
RBC # BLD AUTO: 3.52 10*6/MM3 (ref 3.77–5.28)
RBC # BLD AUTO: 3.56 10*6/MM3 (ref 3.77–5.28)
RBC # BLD AUTO: 3.57 10*6/MM3 (ref 3.77–5.28)
RBC # BLD AUTO: 3.58 10*6/MM3 (ref 3.77–5.28)
RBC # BLD AUTO: 3.68 10*6/MM3 (ref 3.77–5.28)
RBC # BLD AUTO: 3.72 10*6/MM3 (ref 3.77–5.28)
RBC # BLD AUTO: 3.76 10*6/MM3 (ref 3.77–5.28)
RBC # BLD AUTO: 3.79 10*6/MM3 (ref 3.77–5.28)
RBC # BLD AUTO: 3.85 10*6/MM3 (ref 3.77–5.28)
RBC # BLD AUTO: 3.87 10*6/MM3 (ref 3.77–5.28)
RBC # BLD AUTO: 4 10*6/MM3 (ref 3.77–5.28)
RBC # BLD AUTO: 4.74 10*6/MM3 (ref 3.77–5.28)
RBC # UR STRIP: ABNORMAL /HPF
RBC # UR STRIP: ABNORMAL /HPF
REF LAB TEST METHOD: ABNORMAL
REF LAB TEST METHOD: ABNORMAL
SARS-COV-2 RNA PNL SPEC NAA+PROBE: NOT DETECTED
SARS-COV-2 RNA PNL SPEC NAA+PROBE: NOT DETECTED
SINUS: 2.6 CM
SODIUM SERPL-SCNC: 135 MMOL/L (ref 136–145)
SODIUM SERPL-SCNC: 136 MMOL/L (ref 136–145)
SODIUM SERPL-SCNC: 137 MMOL/L (ref 136–145)
SODIUM SERPL-SCNC: 138 MMOL/L (ref 136–145)
SODIUM SERPL-SCNC: 138 MMOL/L (ref 136–145)
SODIUM SERPL-SCNC: 139 MMOL/L (ref 136–145)
SODIUM SERPL-SCNC: 140 MMOL/L (ref 136–145)
SODIUM SERPL-SCNC: 141 MMOL/L (ref 136–145)
SODIUM SERPL-SCNC: 142 MMOL/L (ref 136–145)
SODIUM SERPL-SCNC: 142 MMOL/L (ref 136–145)
SP GR UR STRIP: 1.01 (ref 1–1.03)
SP GR UR STRIP: 1.02 (ref 1–1.03)
SQUAMOUS #/AREA URNS HPF: ABNORMAL /HPF
SQUAMOUS #/AREA URNS HPF: ABNORMAL /HPF
STRESS TARGET HR: 111 BPM
TRIGL SERPL-MCNC: 110 MG/DL (ref 0–150)
TROPONIN T SERPL-MCNC: 0.06 NG/ML (ref 0–0.03)
TROPONIN T SERPL-MCNC: 0.25 NG/ML (ref 0–0.03)
TROPONIN T SERPL-MCNC: 0.34 NG/ML (ref 0–0.03)
TROPONIN T SERPL-MCNC: 0.47 NG/ML (ref 0–0.03)
TROPONIN T SERPL-MCNC: <0.01 NG/ML (ref 0–0.03)
TSH SERPL DL<=0.05 MIU/L-ACNC: 2.16 UIU/ML (ref 0.27–4.2)
URATE CRY URNS QL MICRO: ABNORMAL /HPF
UROBILINOGEN UR QL STRIP: ABNORMAL
UROBILINOGEN UR QL STRIP: ABNORMAL
VANCOMYCIN SERPL-MCNC: 16.8 MCG/ML (ref 5–40)
VANCOMYCIN SERPL-MCNC: 19.6 MCG/ML (ref 5–40)
VANCOMYCIN SERPL-MCNC: 24.3 MCG/ML (ref 5–40)
VARIANT LYMPHS NFR BLD MANUAL: 11.1 % (ref 19.6–45.3)
VLDLC SERPL-MCNC: 20 MG/DL (ref 5–40)
WBC # UR STRIP: ABNORMAL /HPF
WBC # UR STRIP: ABNORMAL /HPF
WBC MORPH BLD: NORMAL
WBC NRBC COR # BLD: 10.24 10*3/MM3 (ref 3.4–10.8)
WBC NRBC COR # BLD: 10.86 10*3/MM3 (ref 3.4–10.8)
WBC NRBC COR # BLD: 10.96 10*3/MM3 (ref 3.4–10.8)
WBC NRBC COR # BLD: 11.56 10*3/MM3 (ref 3.4–10.8)
WBC NRBC COR # BLD: 11.84 10*3/MM3 (ref 3.4–10.8)
WBC NRBC COR # BLD: 12.84 10*3/MM3 (ref 3.4–10.8)
WBC NRBC COR # BLD: 12.89 10*3/MM3 (ref 3.4–10.8)
WBC NRBC COR # BLD: 12.97 10*3/MM3 (ref 3.4–10.8)
WBC NRBC COR # BLD: 13.22 10*3/MM3 (ref 3.4–10.8)
WBC NRBC COR # BLD: 17.54 10*3/MM3 (ref 3.4–10.8)
WBC NRBC COR # BLD: 18.77 10*3/MM3 (ref 3.4–10.8)
WBC NRBC COR # BLD: 20 10*3/MM3 (ref 3.4–10.8)
WBC NRBC COR # BLD: 20.41 10*3/MM3 (ref 3.4–10.8)
WBC NRBC COR # BLD: 26.8 10*3/MM3 (ref 3.4–10.8)
WBC NRBC COR # BLD: 9.12 10*3/MM3 (ref 3.4–10.8)
WBC NRBC COR # BLD: 9.17 10*3/MM3 (ref 3.4–10.8)
WBC NRBC COR # BLD: 9.67 10*3/MM3 (ref 3.4–10.8)
WHOLE BLOOD HOLD SPECIMEN: NORMAL
WHOLE BLOOD HOLD SPECIMEN: NORMAL
YEAST URNS QL MICRO: ABNORMAL /HPF

## 2022-01-01 PROCEDURE — G0378 HOSPITAL OBSERVATION PER HR: HCPCS

## 2022-01-01 PROCEDURE — 99213 OFFICE O/P EST LOW 20 MIN: CPT | Performed by: PHYSICIAN ASSISTANT

## 2022-01-01 PROCEDURE — 93306 TTE W/DOPPLER COMPLETE: CPT | Performed by: INTERNAL MEDICINE

## 2022-01-01 PROCEDURE — 87641 MR-STAPH DNA AMP PROBE: CPT | Performed by: HOSPITALIST

## 2022-01-01 PROCEDURE — 83690 ASSAY OF LIPASE: CPT | Performed by: NURSE PRACTITIONER

## 2022-01-01 PROCEDURE — 80048 BASIC METABOLIC PNL TOTAL CA: CPT | Performed by: HOSPITALIST

## 2022-01-01 PROCEDURE — 25010000002 PIPERACILLIN SOD-TAZOBACTAM PER 1 G: Performed by: HOSPITALIST

## 2022-01-01 PROCEDURE — 82962 GLUCOSE BLOOD TEST: CPT

## 2022-01-01 PROCEDURE — 85730 THROMBOPLASTIN TIME PARTIAL: CPT

## 2022-01-01 PROCEDURE — 25010000002 PIPERACILLIN SOD-TAZOBACTAM PER 1 G: Performed by: INTERNAL MEDICINE

## 2022-01-01 PROCEDURE — 99232 SBSQ HOSP IP/OBS MODERATE 35: CPT | Performed by: NURSE PRACTITIONER

## 2022-01-01 PROCEDURE — 99221 1ST HOSP IP/OBS SF/LOW 40: CPT | Performed by: INTERNAL MEDICINE

## 2022-01-01 PROCEDURE — 83690 ASSAY OF LIPASE: CPT | Performed by: EMERGENCY MEDICINE

## 2022-01-01 PROCEDURE — 85025 COMPLETE CBC W/AUTO DIFF WBC: CPT | Performed by: HOSPITALIST

## 2022-01-01 PROCEDURE — 83036 HEMOGLOBIN GLYCOSYLATED A1C: CPT | Performed by: HOSPITALIST

## 2022-01-01 PROCEDURE — 99222 1ST HOSP IP/OBS MODERATE 55: CPT | Performed by: INTERNAL MEDICINE

## 2022-01-01 PROCEDURE — 93010 ELECTROCARDIOGRAM REPORT: CPT | Performed by: INTERNAL MEDICINE

## 2022-01-01 PROCEDURE — 25010000002 VANCOMYCIN PER 500 MG: Performed by: HOSPITALIST

## 2022-01-01 PROCEDURE — 25010000002 PIPERACILLIN SOD-TAZOBACTAM PER 1 G: Performed by: EMERGENCY MEDICINE

## 2022-01-01 PROCEDURE — 25010000002 PROCHLORPERAZINE 10 MG/2ML SOLUTION: Performed by: HOSPITALIST

## 2022-01-01 PROCEDURE — 74220 X-RAY XM ESOPHAGUS 1CNTRST: CPT

## 2022-01-01 PROCEDURE — 92611 MOTION FLUOROSCOPY/SWALLOW: CPT | Performed by: SPEECH-LANGUAGE PATHOLOGIST

## 2022-01-01 PROCEDURE — 87040 BLOOD CULTURE FOR BACTERIA: CPT | Performed by: NURSE PRACTITIONER

## 2022-01-01 PROCEDURE — 87102 FUNGUS ISOLATION CULTURE: CPT | Performed by: HOSPITALIST

## 2022-01-01 PROCEDURE — 80202 ASSAY OF VANCOMYCIN: CPT | Performed by: INTERNAL MEDICINE

## 2022-01-01 PROCEDURE — 93306 TTE W/DOPPLER COMPLETE: CPT

## 2022-01-01 PROCEDURE — 97110 THERAPEUTIC EXERCISES: CPT

## 2022-01-01 PROCEDURE — 85730 THROMBOPLASTIN TIME PARTIAL: CPT | Performed by: HOSPITALIST

## 2022-01-01 PROCEDURE — 25010000002 HYDRALAZINE PER 20 MG: Performed by: HOSPITALIST

## 2022-01-01 PROCEDURE — 87635 SARS-COV-2 COVID-19 AMP PRB: CPT | Performed by: EMERGENCY MEDICINE

## 2022-01-01 PROCEDURE — 63710000001 INSULIN LISPRO (HUMAN) PER 5 UNITS: Performed by: HOSPITALIST

## 2022-01-01 PROCEDURE — 99284 EMERGENCY DEPT VISIT MOD MDM: CPT

## 2022-01-01 PROCEDURE — 85025 COMPLETE CBC W/AUTO DIFF WBC: CPT | Performed by: NURSE PRACTITIONER

## 2022-01-01 PROCEDURE — 25010000002 CEFTRIAXONE PER 250 MG: Performed by: EMERGENCY MEDICINE

## 2022-01-01 PROCEDURE — 92610 EVALUATE SWALLOWING FUNCTION: CPT | Performed by: SPEECH-LANGUAGE PATHOLOGIST

## 2022-01-01 PROCEDURE — 25010000002 HYDROMORPHONE PER 4 MG: Performed by: HOSPITALIST

## 2022-01-01 PROCEDURE — 84484 ASSAY OF TROPONIN QUANT: CPT

## 2022-01-01 PROCEDURE — 25010000002 HEPARIN (PORCINE) 25000-0.45 UT/250ML-% SOLUTION

## 2022-01-01 PROCEDURE — 25010000002 ONDANSETRON PER 1 MG: Performed by: HOSPITALIST

## 2022-01-01 PROCEDURE — 80053 COMPREHEN METABOLIC PANEL: CPT | Performed by: NURSE PRACTITIONER

## 2022-01-01 PROCEDURE — 93005 ELECTROCARDIOGRAM TRACING: CPT

## 2022-01-01 PROCEDURE — 71045 X-RAY EXAM CHEST 1 VIEW: CPT

## 2022-01-01 PROCEDURE — 99232 SBSQ HOSP IP/OBS MODERATE 35: CPT | Performed by: INTERNAL MEDICINE

## 2022-01-01 PROCEDURE — 85025 COMPLETE CBC W/AUTO DIFF WBC: CPT | Performed by: EMERGENCY MEDICINE

## 2022-01-01 PROCEDURE — 86140 C-REACTIVE PROTEIN: CPT | Performed by: INTERNAL MEDICINE

## 2022-01-01 PROCEDURE — 71260 CT THORAX DX C+: CPT

## 2022-01-01 PROCEDURE — 93005 ELECTROCARDIOGRAM TRACING: CPT | Performed by: HOSPITALIST

## 2022-01-01 PROCEDURE — 25010000002 VANCOMYCIN 10 G RECONSTITUTED SOLUTION: Performed by: INTERNAL MEDICINE

## 2022-01-01 PROCEDURE — 80202 ASSAY OF VANCOMYCIN: CPT | Performed by: HOSPITALIST

## 2022-01-01 PROCEDURE — 25010000002 VANCOMYCIN 10 G RECONSTITUTED SOLUTION: Performed by: NURSE PRACTITIONER

## 2022-01-01 PROCEDURE — 83690 ASSAY OF LIPASE: CPT | Performed by: INTERNAL MEDICINE

## 2022-01-01 PROCEDURE — 81001 URINALYSIS AUTO W/SCOPE: CPT | Performed by: EMERGENCY MEDICINE

## 2022-01-01 PROCEDURE — 25010000002 HEPARIN (PORCINE) PER 1000 UNITS

## 2022-01-01 PROCEDURE — 84145 PROCALCITONIN (PCT): CPT | Performed by: NURSE PRACTITIONER

## 2022-01-01 PROCEDURE — 87040 BLOOD CULTURE FOR BACTERIA: CPT | Performed by: HOSPITALIST

## 2022-01-01 PROCEDURE — 97166 OT EVAL MOD COMPLEX 45 MIN: CPT

## 2022-01-01 PROCEDURE — 85025 COMPLETE CBC W/AUTO DIFF WBC: CPT

## 2022-01-01 PROCEDURE — 82010 KETONE BODYS QUAN: CPT | Performed by: NURSE PRACTITIONER

## 2022-01-01 PROCEDURE — 99203 OFFICE O/P NEW LOW 30 MIN: CPT | Performed by: INTERNAL MEDICINE

## 2022-01-01 PROCEDURE — 25010000002 VANCOMYCIN 750 MG RECONSTITUTED SOLUTION: Performed by: INTERNAL MEDICINE

## 2022-01-01 PROCEDURE — 99214 OFFICE O/P EST MOD 30 MIN: CPT | Performed by: PHYSICIAN ASSISTANT

## 2022-01-01 PROCEDURE — 81001 URINALYSIS AUTO W/SCOPE: CPT | Performed by: NURSE PRACTITIONER

## 2022-01-01 PROCEDURE — 84484 ASSAY OF TROPONIN QUANT: CPT | Performed by: HOSPITALIST

## 2022-01-01 PROCEDURE — 83605 ASSAY OF LACTIC ACID: CPT | Performed by: NURSE PRACTITIONER

## 2022-01-01 PROCEDURE — 84484 ASSAY OF TROPONIN QUANT: CPT | Performed by: INTERNAL MEDICINE

## 2022-01-01 PROCEDURE — 85007 BL SMEAR W/DIFF WBC COUNT: CPT

## 2022-01-01 PROCEDURE — 80061 LIPID PANEL: CPT | Performed by: HOSPITALIST

## 2022-01-01 PROCEDURE — 84443 ASSAY THYROID STIM HORMONE: CPT | Performed by: HOSPITALIST

## 2022-01-01 PROCEDURE — 25010000002 NALOXONE PER 1 MG: Performed by: HOSPITALIST

## 2022-01-01 PROCEDURE — 25010000002 CEFTRIAXONE PER 250 MG: Performed by: HOSPITALIST

## 2022-01-01 PROCEDURE — 87635 SARS-COV-2 COVID-19 AMP PRB: CPT | Performed by: NURSE PRACTITIONER

## 2022-01-01 PROCEDURE — 80053 COMPREHEN METABOLIC PANEL: CPT | Performed by: EMERGENCY MEDICINE

## 2022-01-01 PROCEDURE — 80053 COMPREHEN METABOLIC PANEL: CPT | Performed by: INTERNAL MEDICINE

## 2022-01-01 PROCEDURE — 80053 COMPREHEN METABOLIC PANEL: CPT | Performed by: HOSPITALIST

## 2022-01-01 PROCEDURE — 84484 ASSAY OF TROPONIN QUANT: CPT | Performed by: NURSE PRACTITIONER

## 2022-01-01 PROCEDURE — 74177 CT ABD & PELVIS W/CONTRAST: CPT

## 2022-01-01 PROCEDURE — 25010000002 FENTANYL CITRATE (PF) 50 MCG/ML SOLUTION: Performed by: EMERGENCY MEDICINE

## 2022-01-01 PROCEDURE — 99214 OFFICE O/P EST MOD 30 MIN: CPT | Performed by: INTERNAL MEDICINE

## 2022-01-01 PROCEDURE — 83880 ASSAY OF NATRIURETIC PEPTIDE: CPT | Performed by: HOSPITALIST

## 2022-01-01 PROCEDURE — 87086 URINE CULTURE/COLONY COUNT: CPT | Performed by: EMERGENCY MEDICINE

## 2022-01-01 PROCEDURE — 74230 X-RAY XM SWLNG FUNCJ C+: CPT

## 2022-01-01 PROCEDURE — 93005 ELECTROCARDIOGRAM TRACING: CPT | Performed by: NURSE PRACTITIONER

## 2022-01-01 PROCEDURE — 85610 PROTHROMBIN TIME: CPT

## 2022-01-01 PROCEDURE — 25010000002 IOPAMIDOL 61 % SOLUTION: Performed by: EMERGENCY MEDICINE

## 2022-01-01 PROCEDURE — 36415 COLL VENOUS BLD VENIPUNCTURE: CPT | Performed by: HOSPITALIST

## 2022-01-01 PROCEDURE — 25010000002 PERFLUTREN (DEFINITY) 8.476 MG IN SODIUM CHLORIDE (PF) 0.9 % 10 ML INJECTION: Performed by: NURSE PRACTITIONER

## 2022-01-01 PROCEDURE — 71250 CT THORAX DX C-: CPT

## 2022-01-01 PROCEDURE — P9612 CATHETERIZE FOR URINE SPEC: HCPCS

## 2022-01-01 RX ORDER — AMLODIPINE BESYLATE 10 MG/1
10 TABLET ORAL
Qty: 30 TABLET | Refills: 0 | Status: SHIPPED | OUTPATIENT
Start: 2022-01-01 | End: 2022-04-24

## 2022-01-01 RX ORDER — INSULIN LISPRO 100 [IU]/ML
0-7 INJECTION, SOLUTION INTRAVENOUS; SUBCUTANEOUS
Status: DISCONTINUED | OUTPATIENT
Start: 2022-01-01 | End: 2022-01-01

## 2022-01-01 RX ORDER — VALSARTAN 320 MG/1
320 TABLET ORAL
Status: DISCONTINUED | OUTPATIENT
Start: 2022-01-01 | End: 2022-01-01 | Stop reason: HOSPADM

## 2022-01-01 RX ORDER — FLUOXETINE HYDROCHLORIDE 20 MG/1
20 CAPSULE ORAL DAILY
Status: DISCONTINUED | OUTPATIENT
Start: 2022-01-01 | End: 2022-01-01 | Stop reason: HOSPADM

## 2022-01-01 RX ORDER — PANTOPRAZOLE SODIUM 40 MG/1
40 TABLET, DELAYED RELEASE ORAL EVERY MORNING
Status: DISCONTINUED | OUTPATIENT
Start: 2022-01-01 | End: 2022-01-01

## 2022-01-01 RX ORDER — METOPROLOL SUCCINATE 100 MG/1
100 TABLET, EXTENDED RELEASE ORAL
Status: DISCONTINUED | OUTPATIENT
Start: 2022-01-01 | End: 2022-01-01

## 2022-01-01 RX ORDER — FLUCONAZOLE 100 MG/1
100 TABLET ORAL EVERY 24 HOURS
Status: DISCONTINUED | OUTPATIENT
Start: 2022-01-01 | End: 2022-01-01 | Stop reason: HOSPADM

## 2022-01-01 RX ORDER — ONDANSETRON 2 MG/ML
4 INJECTION INTRAMUSCULAR; INTRAVENOUS EVERY 6 HOURS PRN
Status: DISCONTINUED | OUTPATIENT
Start: 2022-01-01 | End: 2022-01-01 | Stop reason: HOSPADM

## 2022-01-01 RX ORDER — POTASSIUM CHLORIDE 750 MG/1
20 TABLET, FILM COATED, EXTENDED RELEASE ORAL DAILY
Status: DISCONTINUED | OUTPATIENT
Start: 2022-01-01 | End: 2022-01-01

## 2022-01-01 RX ORDER — HYDRALAZINE HYDROCHLORIDE 50 MG/1
50 TABLET, FILM COATED ORAL EVERY 8 HOURS SCHEDULED
Qty: 90 TABLET | Refills: 0 | Status: SHIPPED | OUTPATIENT
Start: 2022-01-01 | End: 2022-04-23

## 2022-01-01 RX ORDER — VALSARTAN 320 MG/1
320 TABLET ORAL
Qty: 30 TABLET | Refills: 0 | Status: SHIPPED | OUTPATIENT
Start: 2022-01-01 | End: 2022-04-24

## 2022-01-01 RX ORDER — ASPIRIN 81 MG/1
81 TABLET ORAL DAILY
Status: DISCONTINUED | OUTPATIENT
Start: 2022-01-01 | End: 2022-01-01

## 2022-01-01 RX ORDER — GUAIFENESIN 200 MG/1
400 TABLET ORAL EVERY 8 HOURS SCHEDULED
Status: DISCONTINUED | OUTPATIENT
Start: 2022-01-01 | End: 2022-01-01 | Stop reason: HOSPADM

## 2022-01-01 RX ORDER — AMLODIPINE BESYLATE 10 MG/1
10 TABLET ORAL
Status: DISCONTINUED | OUTPATIENT
Start: 2022-01-01 | End: 2022-01-01 | Stop reason: HOSPADM

## 2022-01-01 RX ORDER — PRAVASTATIN SODIUM 10 MG
10 TABLET ORAL NIGHTLY
Status: DISCONTINUED | OUTPATIENT
Start: 2022-01-01 | End: 2022-01-01 | Stop reason: HOSPADM

## 2022-01-01 RX ORDER — CALCIUM ACETATE MONOHYDRATE AND ALUMINUM SULFATE TETRADECAHYDRATE 952; 1347 MG/2299MG; MG/2299MG
1 POWDER, FOR SOLUTION TOPICAL EVERY 8 HOURS SCHEDULED
Status: DISCONTINUED | OUTPATIENT
Start: 2022-01-01 | End: 2022-01-01 | Stop reason: HOSPADM

## 2022-01-01 RX ORDER — PRAVASTATIN SODIUM 40 MG
40 TABLET ORAL NIGHTLY
Status: DISCONTINUED | OUTPATIENT
Start: 2022-01-01 | End: 2022-01-01

## 2022-01-01 RX ORDER — CLONIDINE HYDROCHLORIDE 0.1 MG/1
0.3 TABLET ORAL EVERY 8 HOURS SCHEDULED
Status: DISCONTINUED | OUTPATIENT
Start: 2022-01-01 | End: 2022-01-01

## 2022-01-01 RX ORDER — DEXTROSE MONOHYDRATE 25 G/50ML
INJECTION, SOLUTION INTRAVENOUS
Status: COMPLETED
Start: 2022-01-01 | End: 2022-01-01

## 2022-01-01 RX ORDER — CLONIDINE HYDROCHLORIDE 0.1 MG/1
0.1 TABLET ORAL EVERY 8 HOURS SCHEDULED
Status: DISCONTINUED | OUTPATIENT
Start: 2022-01-01 | End: 2022-01-01

## 2022-01-01 RX ORDER — ISOSORBIDE MONONITRATE 30 MG/1
30 TABLET, EXTENDED RELEASE ORAL
Status: DISCONTINUED | OUTPATIENT
Start: 2022-04-09 | End: 2022-01-01 | Stop reason: HOSPADM

## 2022-01-01 RX ORDER — FENTANYL CITRATE 50 UG/ML
25 INJECTION, SOLUTION INTRAMUSCULAR; INTRAVENOUS ONCE
Status: DISCONTINUED | OUTPATIENT
Start: 2022-01-01 | End: 2022-01-01

## 2022-01-01 RX ORDER — PRAVASTATIN SODIUM 10 MG
10 TABLET ORAL NIGHTLY
Status: DISCONTINUED | OUTPATIENT
Start: 2022-01-01 | End: 2022-01-01

## 2022-01-01 RX ORDER — NALOXONE HCL 0.4 MG/ML
0.2 VIAL (ML) INJECTION AS NEEDED
Status: DISCONTINUED | OUTPATIENT
Start: 2022-01-01 | End: 2022-01-01 | Stop reason: HOSPADM

## 2022-01-01 RX ORDER — PROCHLORPERAZINE EDISYLATE 5 MG/ML
10 INJECTION INTRAMUSCULAR; INTRAVENOUS EVERY 6 HOURS PRN
Status: DISCONTINUED | OUTPATIENT
Start: 2022-01-01 | End: 2022-01-01 | Stop reason: HOSPADM

## 2022-01-01 RX ORDER — GABAPENTIN 100 MG/1
100 CAPSULE ORAL NIGHTLY
Status: DISCONTINUED | OUTPATIENT
Start: 2022-01-01 | End: 2022-01-01 | Stop reason: HOSPADM

## 2022-01-01 RX ORDER — CLONIDINE HYDROCHLORIDE 0.1 MG/1
0.2 TABLET ORAL EVERY 8 HOURS SCHEDULED
Status: DISCONTINUED | OUTPATIENT
Start: 2022-01-01 | End: 2022-01-01

## 2022-01-01 RX ORDER — HYDROCODONE BITARTRATE AND ACETAMINOPHEN 5; 325 MG/1; MG/1
1 TABLET ORAL EVERY 4 HOURS PRN
Status: DISCONTINUED | OUTPATIENT
Start: 2022-01-01 | End: 2022-01-01

## 2022-01-01 RX ORDER — ONDANSETRON 2 MG/ML
4 INJECTION INTRAMUSCULAR; INTRAVENOUS ONCE
Status: DISCONTINUED | OUTPATIENT
Start: 2022-01-01 | End: 2022-01-01

## 2022-01-01 RX ORDER — HYDRALAZINE HYDROCHLORIDE 50 MG/1
100 TABLET, FILM COATED ORAL EVERY 8 HOURS SCHEDULED
Status: DISCONTINUED | OUTPATIENT
Start: 2022-01-01 | End: 2022-01-01 | Stop reason: HOSPADM

## 2022-01-01 RX ORDER — FENTANYL CITRATE 50 UG/ML
50 INJECTION, SOLUTION INTRAMUSCULAR; INTRAVENOUS ONCE
Status: DISCONTINUED | OUTPATIENT
Start: 2022-01-01 | End: 2022-01-01

## 2022-01-01 RX ORDER — POTASSIUM CHLORIDE 750 MG/1
20 TABLET, FILM COATED, EXTENDED RELEASE ORAL 2 TIMES DAILY WITH MEALS
Status: DISCONTINUED | OUTPATIENT
Start: 2022-01-01 | End: 2022-01-01 | Stop reason: HOSPADM

## 2022-01-01 RX ORDER — HYDRALAZINE HYDROCHLORIDE 20 MG/ML
10 INJECTION INTRAMUSCULAR; INTRAVENOUS EVERY 4 HOURS PRN
Status: DISCONTINUED | OUTPATIENT
Start: 2022-01-01 | End: 2022-01-01

## 2022-01-01 RX ORDER — OMEPRAZOLE 20 MG/1
20 CAPSULE, DELAYED RELEASE ORAL 2 TIMES DAILY
COMMUNITY

## 2022-01-01 RX ORDER — POTASSIUM CHLORIDE 1.5 G/1.77G
20 POWDER, FOR SOLUTION ORAL 2 TIMES DAILY WITH MEALS
Status: DISCONTINUED | OUTPATIENT
Start: 2022-01-01 | End: 2022-01-01

## 2022-01-01 RX ORDER — PRAVASTATIN SODIUM 40 MG
40 TABLET ORAL DAILY
Status: DISCONTINUED | OUTPATIENT
Start: 2022-01-01 | End: 2022-01-01

## 2022-01-01 RX ORDER — CLONIDINE HYDROCHLORIDE 0.1 MG/1
0.2 TABLET ORAL EVERY 8 HOURS SCHEDULED
Status: DISCONTINUED | OUTPATIENT
Start: 2022-01-01 | End: 2022-01-01 | Stop reason: HOSPADM

## 2022-01-01 RX ORDER — CLONIDINE HYDROCHLORIDE 0.1 MG/1
0.2 TABLET ORAL EVERY 12 HOURS SCHEDULED
Status: DISCONTINUED | OUTPATIENT
Start: 2022-01-01 | End: 2022-01-01

## 2022-01-01 RX ORDER — HYDRALAZINE HYDROCHLORIDE 50 MG/1
50 TABLET, FILM COATED ORAL EVERY 8 HOURS SCHEDULED
Status: DISCONTINUED | OUTPATIENT
Start: 2022-01-01 | End: 2022-01-01 | Stop reason: HOSPADM

## 2022-01-01 RX ORDER — ACETAMINOPHEN 500 MG
500 TABLET ORAL EVERY 6 HOURS PRN
Status: DISCONTINUED | OUTPATIENT
Start: 2022-01-01 | End: 2022-01-01

## 2022-01-01 RX ORDER — PRAVASTATIN SODIUM 10 MG
10 TABLET ORAL NIGHTLY
Qty: 30 TABLET | Refills: 0 | Status: SHIPPED | OUTPATIENT
Start: 2022-01-01 | End: 2022-04-23

## 2022-01-01 RX ORDER — INSULIN LISPRO 100 [IU]/ML
0-7 INJECTION, SOLUTION INTRAVENOUS; SUBCUTANEOUS
Status: DISCONTINUED | OUTPATIENT
Start: 2022-01-01 | End: 2022-01-01 | Stop reason: HOSPADM

## 2022-01-01 RX ORDER — POTASSIUM CHLORIDE 750 MG/1
40 TABLET, FILM COATED, EXTENDED RELEASE ORAL AS NEEDED
Status: DISCONTINUED | OUTPATIENT
Start: 2022-01-01 | End: 2022-01-01

## 2022-01-01 RX ORDER — DOCUSATE SODIUM 100 MG/1
100 CAPSULE, LIQUID FILLED ORAL DAILY PRN
Status: DISCONTINUED | OUTPATIENT
Start: 2022-01-01 | End: 2022-01-01

## 2022-01-01 RX ORDER — ASPIRIN 81 MG/1
81 TABLET, CHEWABLE ORAL DAILY
Status: DISCONTINUED | OUTPATIENT
Start: 2022-01-01 | End: 2022-01-01 | Stop reason: HOSPADM

## 2022-01-01 RX ORDER — GUAIFENESIN 600 MG/1
600 TABLET, EXTENDED RELEASE ORAL EVERY 12 HOURS SCHEDULED
Status: DISCONTINUED | OUTPATIENT
Start: 2022-01-01 | End: 2022-01-01

## 2022-01-01 RX ORDER — LANSOPRAZOLE
30 KIT
Status: DISCONTINUED | OUTPATIENT
Start: 2022-01-01 | End: 2022-01-01 | Stop reason: HOSPADM

## 2022-01-01 RX ORDER — GUAIFENESIN 200 MG/10ML
200 LIQUID ORAL EVERY 4 HOURS PRN
Status: DISCONTINUED | OUTPATIENT
Start: 2022-01-01 | End: 2022-01-01

## 2022-01-01 RX ORDER — HEPARIN SODIUM 10000 [USP'U]/100ML
12 INJECTION, SOLUTION INTRAVENOUS
Status: DISCONTINUED | OUTPATIENT
Start: 2022-01-01 | End: 2022-01-01

## 2022-01-01 RX ORDER — CLONIDINE HYDROCHLORIDE 0.1 MG/1
0.3 TABLET ORAL EVERY 8 HOURS SCHEDULED
Status: DISCONTINUED | OUTPATIENT
Start: 2022-01-01 | End: 2022-01-01 | Stop reason: HOSPADM

## 2022-01-01 RX ORDER — INSULIN LISPRO 100 [IU]/ML
5 INJECTION, SOLUTION INTRAVENOUS; SUBCUTANEOUS
Status: DISCONTINUED | OUTPATIENT
Start: 2022-01-01 | End: 2022-01-01

## 2022-01-01 RX ORDER — AMLODIPINE BESYLATE 5 MG/1
10 TABLET ORAL
Status: DISCONTINUED | OUTPATIENT
Start: 2022-01-01 | End: 2022-01-01 | Stop reason: HOSPADM

## 2022-01-01 RX ORDER — NICOTINE POLACRILEX 4 MG
15 LOZENGE BUCCAL
Status: DISCONTINUED | OUTPATIENT
Start: 2022-01-01 | End: 2022-01-01

## 2022-01-01 RX ORDER — HYDROMORPHONE HYDROCHLORIDE 1 MG/ML
0.5 INJECTION, SOLUTION INTRAMUSCULAR; INTRAVENOUS; SUBCUTANEOUS ONCE
Status: COMPLETED | OUTPATIENT
Start: 2022-01-01 | End: 2022-01-01

## 2022-01-01 RX ORDER — IPRATROPIUM BROMIDE AND ALBUTEROL SULFATE 2.5; .5 MG/3ML; MG/3ML
3 SOLUTION RESPIRATORY (INHALATION) EVERY 4 HOURS PRN
Status: DISCONTINUED | OUTPATIENT
Start: 2022-01-01 | End: 2022-01-01

## 2022-01-01 RX ORDER — ACETAMINOPHEN 325 MG/1
650 TABLET ORAL EVERY 6 HOURS PRN
Status: DISCONTINUED | OUTPATIENT
Start: 2022-01-01 | End: 2022-01-01 | Stop reason: HOSPADM

## 2022-01-01 RX ORDER — IPRATROPIUM BROMIDE AND ALBUTEROL SULFATE 2.5; .5 MG/3ML; MG/3ML
3 SOLUTION RESPIRATORY (INHALATION)
Status: DISCONTINUED | OUTPATIENT
Start: 2022-01-01 | End: 2022-01-01

## 2022-01-01 RX ORDER — PANTOPRAZOLE SODIUM 40 MG/1
40 TABLET, DELAYED RELEASE ORAL
Status: DISCONTINUED | OUTPATIENT
Start: 2022-01-01 | End: 2022-01-01

## 2022-01-01 RX ORDER — UREA 10 %
3 LOTION (ML) TOPICAL NIGHTLY
Status: DISCONTINUED | OUTPATIENT
Start: 2022-01-01 | End: 2022-01-01

## 2022-01-01 RX ORDER — PANTOPRAZOLE SODIUM 40 MG/1
40 TABLET, DELAYED RELEASE ORAL
Status: DISCONTINUED | OUTPATIENT
Start: 2022-01-01 | End: 2022-01-01 | Stop reason: HOSPADM

## 2022-01-01 RX ORDER — DILTIAZEM HYDROCHLORIDE 180 MG/1
180 CAPSULE, COATED, EXTENDED RELEASE ORAL
Status: DISCONTINUED | OUTPATIENT
Start: 2022-01-01 | End: 2022-01-01

## 2022-01-01 RX ORDER — HYDROMORPHONE HYDROCHLORIDE 1 MG/ML
0.5 INJECTION, SOLUTION INTRAMUSCULAR; INTRAVENOUS; SUBCUTANEOUS EVERY 4 HOURS PRN
Status: DISCONTINUED | OUTPATIENT
Start: 2022-01-01 | End: 2022-01-01 | Stop reason: HOSPADM

## 2022-01-01 RX ORDER — POTASSIUM CHLORIDE 20 MEQ/1
20 TABLET, EXTENDED RELEASE ORAL 2 TIMES DAILY WITH MEALS
Qty: 60 TABLET | Refills: 0 | Status: SHIPPED | OUTPATIENT
Start: 2022-01-01 | End: 2022-04-23

## 2022-01-01 RX ORDER — POTASSIUM CHLORIDE 750 MG/1
10 TABLET, FILM COATED, EXTENDED RELEASE ORAL DAILY
Status: DISCONTINUED | OUTPATIENT
Start: 2022-01-01 | End: 2022-01-01

## 2022-01-01 RX ORDER — UREA 10 %
3 LOTION (ML) TOPICAL NIGHTLY PRN
Status: DISCONTINUED | OUTPATIENT
Start: 2022-01-01 | End: 2022-01-01 | Stop reason: HOSPADM

## 2022-01-01 RX ORDER — POTASSIUM CHLORIDE 750 MG/1
20 TABLET, FILM COATED, EXTENDED RELEASE ORAL 2 TIMES DAILY WITH MEALS
Refills: 0 | Status: DISCONTINUED | OUTPATIENT
Start: 2022-01-01 | End: 2022-01-01

## 2022-01-01 RX ORDER — SODIUM BICARBONATE 650 MG/1
650 TABLET ORAL 3 TIMES DAILY
Status: DISCONTINUED | OUTPATIENT
Start: 2022-01-01 | End: 2022-01-01

## 2022-01-01 RX ORDER — CLONIDINE HYDROCHLORIDE 0.2 MG/1
0.2 TABLET ORAL EVERY 8 HOURS SCHEDULED
Qty: 90 TABLET | Refills: 0 | Status: SHIPPED | OUTPATIENT
Start: 2022-01-01 | End: 2022-04-23

## 2022-01-01 RX ORDER — SODIUM CHLORIDE 0.9 % (FLUSH) 0.9 %
10 SYRINGE (ML) INJECTION AS NEEDED
Status: DISCONTINUED | OUTPATIENT
Start: 2022-01-01 | End: 2022-01-01 | Stop reason: HOSPADM

## 2022-01-01 RX ORDER — UREA 10 %
3 LOTION (ML) TOPICAL NIGHTLY PRN
Status: DISCONTINUED | OUTPATIENT
Start: 2022-01-01 | End: 2022-01-01

## 2022-01-01 RX ORDER — DEXTROSE MONOHYDRATE 25 G/50ML
50 INJECTION, SOLUTION INTRAVENOUS
Status: DISCONTINUED | OUTPATIENT
Start: 2022-01-01 | End: 2022-01-01 | Stop reason: HOSPADM

## 2022-01-01 RX ORDER — FLUOXETINE HYDROCHLORIDE 20 MG/1
20 CAPSULE ORAL DAILY
COMMUNITY

## 2022-01-01 RX ORDER — ATORVASTATIN CALCIUM 20 MG/1
40 TABLET, FILM COATED ORAL NIGHTLY
Status: DISCONTINUED | OUTPATIENT
Start: 2022-01-01 | End: 2022-01-01 | Stop reason: HOSPADM

## 2022-01-01 RX ORDER — FENTANYL CITRATE 50 UG/ML
50 INJECTION, SOLUTION INTRAMUSCULAR; INTRAVENOUS ONCE
Status: COMPLETED | OUTPATIENT
Start: 2022-01-01 | End: 2022-01-01

## 2022-01-01 RX ORDER — SODIUM BICARBONATE 650 MG/1
1300 TABLET ORAL 3 TIMES DAILY
Status: DISCONTINUED | OUTPATIENT
Start: 2022-01-01 | End: 2022-01-01 | Stop reason: HOSPADM

## 2022-01-01 RX ORDER — CLOPIDOGREL BISULFATE 75 MG/1
75 TABLET ORAL DAILY
Status: DISCONTINUED | OUTPATIENT
Start: 2022-01-01 | End: 2022-01-01 | Stop reason: HOSPADM

## 2022-01-01 RX ORDER — HEPARIN SODIUM 5000 [USP'U]/ML
57.7 INJECTION, SOLUTION INTRAVENOUS; SUBCUTANEOUS ONCE
Status: COMPLETED | OUTPATIENT
Start: 2022-01-01 | End: 2022-01-01

## 2022-01-01 RX ORDER — HYDRALAZINE HYDROCHLORIDE 50 MG/1
50 TABLET, FILM COATED ORAL EVERY 8 HOURS SCHEDULED
Status: DISCONTINUED | OUTPATIENT
Start: 2022-01-01 | End: 2022-01-01

## 2022-01-01 RX ORDER — DEXTROSE MONOHYDRATE 25 G/50ML
25 INJECTION, SOLUTION INTRAVENOUS
Status: DISCONTINUED | OUTPATIENT
Start: 2022-01-01 | End: 2022-01-01

## 2022-01-01 RX ORDER — INSULIN LISPRO 100 [IU]/ML
3 INJECTION, SOLUTION INTRAVENOUS; SUBCUTANEOUS
Status: DISCONTINUED | OUTPATIENT
Start: 2022-01-01 | End: 2022-01-01

## 2022-01-01 RX ORDER — HYDRALAZINE HYDROCHLORIDE 25 MG/1
25 TABLET, FILM COATED ORAL EVERY 8 HOURS SCHEDULED
Status: DISCONTINUED | OUTPATIENT
Start: 2022-01-01 | End: 2022-01-01

## 2022-01-01 RX ORDER — POTASSIUM CHLORIDE 1.5 G/1.77G
40 POWDER, FOR SOLUTION ORAL AS NEEDED
Status: DISCONTINUED | OUTPATIENT
Start: 2022-01-01 | End: 2022-01-01

## 2022-01-01 RX ORDER — HEPARIN SODIUM 5000 [USP'U]/ML
30-57.7 INJECTION, SOLUTION INTRAVENOUS; SUBCUTANEOUS EVERY 6 HOURS PRN
Status: DISCONTINUED | OUTPATIENT
Start: 2022-01-01 | End: 2022-01-01 | Stop reason: HOSPADM

## 2022-01-01 RX ORDER — SODIUM CHLORIDE 9 MG/ML
75 INJECTION, SOLUTION INTRAVENOUS CONTINUOUS
Status: DISCONTINUED | OUTPATIENT
Start: 2022-01-01 | End: 2022-01-01 | Stop reason: HOSPADM

## 2022-01-01 RX ORDER — CETIRIZINE HYDROCHLORIDE 10 MG/1
10 TABLET ORAL DAILY
Status: DISCONTINUED | OUTPATIENT
Start: 2022-01-01 | End: 2022-01-01

## 2022-01-01 RX ORDER — FENTANYL CITRATE 50 UG/ML
25 INJECTION, SOLUTION INTRAMUSCULAR; INTRAVENOUS ONCE
Status: COMPLETED | OUTPATIENT
Start: 2022-01-01 | End: 2022-01-01

## 2022-01-01 RX ORDER — ISOSORBIDE MONONITRATE 30 MG/1
30 TABLET, EXTENDED RELEASE ORAL
Status: DISCONTINUED | OUTPATIENT
Start: 2022-01-01 | End: 2022-01-01

## 2022-01-01 RX ORDER — VANCOMYCIN HYDROCHLORIDE 1 G/200ML
1000 INJECTION, SOLUTION INTRAVENOUS EVERY 24 HOURS
Status: DISCONTINUED | OUTPATIENT
Start: 2022-01-01 | End: 2022-01-01 | Stop reason: DRUGHIGH

## 2022-01-01 RX ORDER — SODIUM CHLORIDE 9 MG/ML
75 INJECTION, SOLUTION INTRAVENOUS CONTINUOUS
Status: DISCONTINUED | OUTPATIENT
Start: 2022-01-01 | End: 2022-01-01

## 2022-01-01 RX ORDER — METOPROLOL SUCCINATE 50 MG/1
50 TABLET, EXTENDED RELEASE ORAL
Status: DISCONTINUED | OUTPATIENT
Start: 2022-01-01 | End: 2022-01-01

## 2022-01-01 RX ORDER — HYDROCODONE BITARTRATE AND ACETAMINOPHEN 5; 325 MG/1; MG/1
1 TABLET ORAL EVERY 6 HOURS PRN
Status: DISCONTINUED | OUTPATIENT
Start: 2022-01-01 | End: 2022-01-01 | Stop reason: HOSPADM

## 2022-01-01 RX ADMIN — VALSARTAN 320 MG: 320 TABLET, FILM COATED ORAL at 08:45

## 2022-01-01 RX ADMIN — SODIUM BICARBONATE 1300 MG: 650 TABLET ORAL at 15:49

## 2022-01-01 RX ADMIN — PANTOPRAZOLE SODIUM 40 MG: 40 TABLET, DELAYED RELEASE ORAL at 06:00

## 2022-01-01 RX ADMIN — HYDROMORPHONE HYDROCHLORIDE 0.5 MG: 1 INJECTION, SOLUTION INTRAMUSCULAR; INTRAVENOUS; SUBCUTANEOUS at 17:29

## 2022-01-01 RX ADMIN — CLONIDINE HYDROCHLORIDE 0.3 MG: 0.1 TABLET ORAL at 05:39

## 2022-01-01 RX ADMIN — POTASSIUM CHLORIDE 40 MEQ: 1.5 POWDER, FOR SOLUTION ORAL at 22:54

## 2022-01-01 RX ADMIN — DEXTROSE MONOHYDRATE 50 ML: 25 INJECTION, SOLUTION INTRAVENOUS at 15:53

## 2022-01-01 RX ADMIN — SODIUM BICARBONATE 1300 MG: 650 TABLET ORAL at 09:16

## 2022-01-01 RX ADMIN — TAZOBACTAM SODIUM AND PIPERACILLIN SODIUM 3.38 G: 375; 3 INJECTION, SOLUTION INTRAVENOUS at 11:40

## 2022-01-01 RX ADMIN — PANTOPRAZOLE SODIUM 40 MG: 40 TABLET, DELAYED RELEASE ORAL at 09:52

## 2022-01-01 RX ADMIN — PRAVASTATIN SODIUM 40 MG: 40 TABLET ORAL at 22:08

## 2022-01-01 RX ADMIN — PROCHLORPERAZINE EDISYLATE 10 MG: 5 INJECTION INTRAMUSCULAR; INTRAVENOUS at 16:42

## 2022-01-01 RX ADMIN — PRAVASTATIN SODIUM 10 MG: 10 TABLET ORAL at 21:39

## 2022-01-01 RX ADMIN — PROCHLORPERAZINE EDISYLATE 10 MG: 5 INJECTION INTRAMUSCULAR; INTRAVENOUS at 20:08

## 2022-01-01 RX ADMIN — ACETAMINOPHEN 650 MG: 325 TABLET ORAL at 21:24

## 2022-01-01 RX ADMIN — PANTOPRAZOLE SODIUM 40 MG: 40 TABLET, DELAYED RELEASE ORAL at 07:02

## 2022-01-01 RX ADMIN — CLONIDINE HYDROCHLORIDE 0.2 MG: 0.1 TABLET ORAL at 21:14

## 2022-01-01 RX ADMIN — GABAPENTIN 100 MG: 100 CAPSULE ORAL at 02:52

## 2022-01-01 RX ADMIN — PANTOPRAZOLE SODIUM 40 MG: 40 TABLET, DELAYED RELEASE ORAL at 06:30

## 2022-01-01 RX ADMIN — TAZOBACTAM SODIUM AND PIPERACILLIN SODIUM 3.38 G: 375; 3 INJECTION, SOLUTION INTRAVENOUS at 02:35

## 2022-01-01 RX ADMIN — HYDRALAZINE HYDROCHLORIDE 100 MG: 50 TABLET, FILM COATED ORAL at 14:02

## 2022-01-01 RX ADMIN — ASPIRIN 81 MG: 81 TABLET, CHEWABLE ORAL at 08:46

## 2022-01-01 RX ADMIN — AMLODIPINE BESYLATE 10 MG: 10 TABLET ORAL at 10:18

## 2022-01-01 RX ADMIN — GUAIFENESIN 400 MG: 200 TABLET ORAL at 20:51

## 2022-01-01 RX ADMIN — INSULIN LISPRO 2 UNITS: 100 INJECTION, SOLUTION INTRAVENOUS; SUBCUTANEOUS at 17:08

## 2022-01-01 RX ADMIN — AMLODIPINE BESYLATE 10 MG: 5 TABLET ORAL at 08:20

## 2022-01-01 RX ADMIN — Medication 10 ML: at 21:26

## 2022-01-01 RX ADMIN — PANTOPRAZOLE SODIUM 40 MG: 40 TABLET, DELAYED RELEASE ORAL at 17:36

## 2022-01-01 RX ADMIN — SODIUM BICARBONATE 1300 MG: 650 TABLET ORAL at 20:48

## 2022-01-01 RX ADMIN — BARIUM SULFATE 1 TEASPOON(S): 0.6 CREAM ORAL at 11:28

## 2022-01-01 RX ADMIN — VANCOMYCIN HYDROCHLORIDE 1000 MG: 1 INJECTION, SOLUTION INTRAVENOUS at 17:36

## 2022-01-01 RX ADMIN — HYDRALAZINE HYDROCHLORIDE 100 MG: 50 TABLET, FILM COATED ORAL at 05:39

## 2022-01-01 RX ADMIN — HYDRALAZINE HYDROCHLORIDE 25 MG: 25 TABLET, FILM COATED ORAL at 23:31

## 2022-01-01 RX ADMIN — GABAPENTIN 100 MG: 100 CAPSULE ORAL at 21:41

## 2022-01-01 RX ADMIN — INSULIN LISPRO 3 UNITS: 100 INJECTION, SOLUTION INTRAVENOUS; SUBCUTANEOUS at 13:51

## 2022-01-01 RX ADMIN — POTASSIUM CHLORIDE 40 MEQ: 10 TABLET, EXTENDED RELEASE ORAL at 03:44

## 2022-01-01 RX ADMIN — TAZOBACTAM SODIUM AND PIPERACILLIN SODIUM 3.38 G: 375; 3 INJECTION, SOLUTION INTRAVENOUS at 02:15

## 2022-01-01 RX ADMIN — VANCOMYCIN HYDROCHLORIDE 750 MG: 750 INJECTION, POWDER, LYOPHILIZED, FOR SOLUTION INTRAVENOUS at 23:41

## 2022-01-01 RX ADMIN — AMLODIPINE BESYLATE 10 MG: 10 TABLET ORAL at 08:24

## 2022-01-01 RX ADMIN — HYDRALAZINE HYDROCHLORIDE 25 MG: 25 TABLET, FILM COATED ORAL at 18:54

## 2022-01-01 RX ADMIN — SODIUM CHLORIDE 75 ML/HR: 9 INJECTION, SOLUTION INTRAVENOUS at 14:45

## 2022-01-01 RX ADMIN — HYDROCODONE BITARTRATE AND ACETAMINOPHEN 1 TABLET: 5; 325 TABLET ORAL at 13:42

## 2022-01-01 RX ADMIN — GUAIFENESIN 400 MG: 200 TABLET ORAL at 14:19

## 2022-01-01 RX ADMIN — PANTOPRAZOLE SODIUM 40 MG: 40 TABLET, DELAYED RELEASE ORAL at 06:42

## 2022-01-01 RX ADMIN — AMLODIPINE BESYLATE 10 MG: 5 TABLET ORAL at 08:29

## 2022-01-01 RX ADMIN — DILTIAZEM HYDROCHLORIDE 300 MG: 180 CAPSULE, COATED, EXTENDED RELEASE ORAL at 09:20

## 2022-01-01 RX ADMIN — POTASSIUM CHLORIDE 20 MEQ: 1.5 POWDER, FOR SOLUTION ORAL at 17:08

## 2022-01-01 RX ADMIN — CLONIDINE HYDROCHLORIDE 0.3 MG: 0.1 TABLET ORAL at 14:26

## 2022-01-01 RX ADMIN — ASPIRIN 81 MG: 81 TABLET, COATED ORAL at 10:07

## 2022-01-01 RX ADMIN — NITROGLYCERIN 1 INCH: 20 OINTMENT TOPICAL at 09:17

## 2022-01-01 RX ADMIN — VALSARTAN 320 MG: 320 TABLET, FILM COATED ORAL at 08:24

## 2022-01-01 RX ADMIN — POTASSIUM CHLORIDE 40 MEQ: 1.5 POWDER, FOR SOLUTION ORAL at 08:46

## 2022-01-01 RX ADMIN — INSULIN LISPRO 5 UNITS: 100 INJECTION, SOLUTION INTRAVENOUS; SUBCUTANEOUS at 17:45

## 2022-01-01 RX ADMIN — TAZOBACTAM SODIUM AND PIPERACILLIN SODIUM 3.38 G: 375; 3 INJECTION, SOLUTION INTRAVENOUS at 11:51

## 2022-01-01 RX ADMIN — CLONIDINE HYDROCHLORIDE 0.3 MG: 0.1 TABLET ORAL at 01:18

## 2022-01-01 RX ADMIN — NITROGLYCERIN 1 INCH: 20 OINTMENT TOPICAL at 21:49

## 2022-01-01 RX ADMIN — VALSARTAN 320 MG: 320 TABLET, FILM COATED ORAL at 10:18

## 2022-01-01 RX ADMIN — AMLODIPINE BESYLATE 10 MG: 10 TABLET ORAL at 18:21

## 2022-01-01 RX ADMIN — GUAIFENESIN 400 MG: 200 TABLET ORAL at 15:16

## 2022-01-01 RX ADMIN — HYDRALAZINE HYDROCHLORIDE 100 MG: 50 TABLET, FILM COATED ORAL at 21:14

## 2022-01-01 RX ADMIN — ONDANSETRON 4 MG: 2 INJECTION INTRAMUSCULAR; INTRAVENOUS at 05:37

## 2022-01-01 RX ADMIN — VALSARTAN 320 MG: 320 TABLET, FILM COATED ORAL at 08:46

## 2022-01-01 RX ADMIN — INSULIN LISPRO 3 UNITS: 100 INJECTION, SOLUTION INTRAVENOUS; SUBCUTANEOUS at 21:28

## 2022-01-01 RX ADMIN — VALSARTAN 320 MG: 320 TABLET, FILM COATED ORAL at 09:04

## 2022-01-01 RX ADMIN — HYDRALAZINE HYDROCHLORIDE 100 MG: 50 TABLET, FILM COATED ORAL at 06:42

## 2022-01-01 RX ADMIN — CALCIUM ACETATE MONOHYDRATE AND ALUMINUM SULFATE TETRADECAHYDRATE 1 PACKET: 952; 1347 POWDER, FOR SOLUTION TOPICAL at 13:42

## 2022-01-01 RX ADMIN — BARIUM SULFATE 4 ML: 980 POWDER, FOR SUSPENSION ORAL at 11:28

## 2022-01-01 RX ADMIN — INSULIN GLARGINE-YFGN 5 UNITS: 100 INJECTION, SOLUTION SUBCUTANEOUS at 21:28

## 2022-01-01 RX ADMIN — HYDRALAZINE HYDROCHLORIDE 100 MG: 50 TABLET, FILM COATED ORAL at 21:41

## 2022-01-01 RX ADMIN — POTASSIUM CHLORIDE 20 MEQ: 1.5 POWDER, FOR SOLUTION ORAL at 09:46

## 2022-01-01 RX ADMIN — ONDANSETRON 4 MG: 2 INJECTION INTRAMUSCULAR; INTRAVENOUS at 04:25

## 2022-01-01 RX ADMIN — HYDRALAZINE HYDROCHLORIDE 100 MG: 50 TABLET, FILM COATED ORAL at 05:54

## 2022-01-01 RX ADMIN — VALSARTAN 320 MG: 320 TABLET, FILM COATED ORAL at 08:49

## 2022-01-01 RX ADMIN — HYDRALAZINE HYDROCHLORIDE 25 MG: 25 TABLET, FILM COATED ORAL at 07:18

## 2022-01-01 RX ADMIN — METOPROLOL TARTRATE 5 MG: 1 INJECTION, SOLUTION INTRAVENOUS at 18:57

## 2022-01-01 RX ADMIN — TAZOBACTAM SODIUM AND PIPERACILLIN SODIUM 3.38 G: 375; 3 INJECTION, SOLUTION INTRAVENOUS at 03:00

## 2022-01-01 RX ADMIN — CLONIDINE HYDROCHLORIDE 0.3 MG: 0.1 TABLET ORAL at 15:16

## 2022-01-01 RX ADMIN — PANTOPRAZOLE SODIUM 40 MG: 40 TABLET, DELAYED RELEASE ORAL at 06:21

## 2022-01-01 RX ADMIN — CALCIUM ACETATE MONOHYDRATE AND ALUMINUM SULFATE TETRADECAHYDRATE 1 PACKET: 952; 1347 POWDER, FOR SOLUTION TOPICAL at 15:49

## 2022-01-01 RX ADMIN — CLONIDINE HYDROCHLORIDE 0.3 MG: 0.1 TABLET ORAL at 06:22

## 2022-01-01 RX ADMIN — GUAIFENESIN 400 MG: 200 TABLET ORAL at 14:23

## 2022-01-01 RX ADMIN — INSULIN LISPRO 2 UNITS: 100 INJECTION, SOLUTION INTRAVENOUS; SUBCUTANEOUS at 08:20

## 2022-01-01 RX ADMIN — GABAPENTIN 100 MG: 100 CAPSULE ORAL at 21:17

## 2022-01-01 RX ADMIN — ASPIRIN 81 MG: 81 TABLET, COATED ORAL at 08:45

## 2022-01-01 RX ADMIN — FLUCONAZOLE 100 MG: 100 TABLET ORAL at 13:39

## 2022-01-01 RX ADMIN — VALSARTAN 320 MG: 320 TABLET, FILM COATED ORAL at 08:29

## 2022-01-01 RX ADMIN — CLONIDINE HYDROCHLORIDE 0.3 MG: 0.1 TABLET ORAL at 21:58

## 2022-01-01 RX ADMIN — LANSOPRAZOLE 30 MG: KIT at 17:43

## 2022-01-01 RX ADMIN — INSULIN LISPRO 4 UNITS: 100 INJECTION, SOLUTION INTRAVENOUS; SUBCUTANEOUS at 21:21

## 2022-01-01 RX ADMIN — HEPARIN SODIUM 19.34 UNITS/KG/HR: 10000 INJECTION, SOLUTION INTRAVENOUS at 17:04

## 2022-01-01 RX ADMIN — PANTOPRAZOLE SODIUM 40 MG: 40 TABLET, DELAYED RELEASE ORAL at 21:19

## 2022-01-01 RX ADMIN — VALSARTAN 320 MG: 320 TABLET, FILM COATED ORAL at 08:47

## 2022-01-01 RX ADMIN — METOPROLOL TARTRATE 12.5 MG: 25 TABLET, FILM COATED ORAL at 20:48

## 2022-01-01 RX ADMIN — HYDRALAZINE HYDROCHLORIDE 100 MG: 50 TABLET, FILM COATED ORAL at 21:22

## 2022-01-01 RX ADMIN — GUAIFENESIN 600 MG: 600 TABLET, EXTENDED RELEASE ORAL at 21:41

## 2022-01-01 RX ADMIN — ZINC OXIDE 1 APPLICATION: 200 OINTMENT TOPICAL at 04:35

## 2022-01-01 RX ADMIN — PANTOPRAZOLE SODIUM 40 MG: 40 TABLET, DELAYED RELEASE ORAL at 08:28

## 2022-01-01 RX ADMIN — HYDROCODONE BITARTRATE AND ACETAMINOPHEN 1 TABLET: 5; 325 TABLET ORAL at 04:02

## 2022-01-01 RX ADMIN — CLONIDINE HYDROCHLORIDE 0.2 MG: 0.1 TABLET ORAL at 13:42

## 2022-01-01 RX ADMIN — TAZOBACTAM SODIUM AND PIPERACILLIN SODIUM 3.38 G: 375; 3 INJECTION, SOLUTION INTRAVENOUS at 10:08

## 2022-01-01 RX ADMIN — TAZOBACTAM SODIUM AND PIPERACILLIN SODIUM 3.38 G: 375; 3 INJECTION, SOLUTION INTRAVENOUS at 09:14

## 2022-01-01 RX ADMIN — NITROGLYCERIN 1 INCH: 20 OINTMENT TOPICAL at 09:51

## 2022-01-01 RX ADMIN — HYDRALAZINE HYDROCHLORIDE 50 MG: 50 TABLET, FILM COATED ORAL at 05:59

## 2022-01-01 RX ADMIN — CLONIDINE HYDROCHLORIDE 0.1 MG: 0.1 TABLET ORAL at 06:05

## 2022-01-01 RX ADMIN — INSULIN LISPRO 3 UNITS: 100 INJECTION, SOLUTION INTRAVENOUS; SUBCUTANEOUS at 18:21

## 2022-01-01 RX ADMIN — CLONIDINE HYDROCHLORIDE 0.3 MG: 0.1 TABLET ORAL at 06:14

## 2022-01-01 RX ADMIN — FLUCONAZOLE 100 MG: 100 TABLET ORAL at 17:22

## 2022-01-01 RX ADMIN — VANCOMYCIN HYDROCHLORIDE 500 MG: 500 INJECTION, POWDER, LYOPHILIZED, FOR SOLUTION INTRAVENOUS at 23:09

## 2022-01-01 RX ADMIN — ASPIRIN 81 MG: 81 TABLET, COATED ORAL at 08:29

## 2022-01-01 RX ADMIN — FLUOXETINE HYDROCHLORIDE 20 MG: 20 CAPSULE ORAL at 09:16

## 2022-01-01 RX ADMIN — TAZOBACTAM SODIUM AND PIPERACILLIN SODIUM 3.38 G: 375; 3 INJECTION, SOLUTION INTRAVENOUS at 15:51

## 2022-01-01 RX ADMIN — HYDRALAZINE HYDROCHLORIDE 100 MG: 50 TABLET, FILM COATED ORAL at 21:58

## 2022-01-01 RX ADMIN — PANTOPRAZOLE SODIUM 40 MG: 40 TABLET, DELAYED RELEASE ORAL at 17:21

## 2022-01-01 RX ADMIN — ONDANSETRON 4 MG: 2 INJECTION INTRAMUSCULAR; INTRAVENOUS at 13:42

## 2022-01-01 RX ADMIN — TAZOBACTAM SODIUM AND PIPERACILLIN SODIUM 3.38 G: 375; 3 INJECTION, SOLUTION INTRAVENOUS at 02:51

## 2022-01-01 RX ADMIN — ASPIRIN 81 MG: 81 TABLET, CHEWABLE ORAL at 09:04

## 2022-01-01 RX ADMIN — TAZOBACTAM SODIUM AND PIPERACILLIN SODIUM 3.38 G: 375; 3 INJECTION, SOLUTION INTRAVENOUS at 03:25

## 2022-01-01 RX ADMIN — GABAPENTIN 100 MG: 100 CAPSULE ORAL at 21:22

## 2022-01-01 RX ADMIN — INSULIN LISPRO 2 UNITS: 100 INJECTION, SOLUTION INTRAVENOUS; SUBCUTANEOUS at 08:34

## 2022-01-01 RX ADMIN — CLONIDINE HYDROCHLORIDE 0.2 MG: 0.1 TABLET ORAL at 21:40

## 2022-01-01 RX ADMIN — NITROGLYCERIN 1 INCH: 20 OINTMENT TOPICAL at 14:11

## 2022-01-01 RX ADMIN — HYDRALAZINE HYDROCHLORIDE 100 MG: 50 TABLET, FILM COATED ORAL at 14:05

## 2022-01-01 RX ADMIN — FLUOXETINE HYDROCHLORIDE 20 MG: 20 CAPSULE ORAL at 09:04

## 2022-01-01 RX ADMIN — ONDANSETRON 4 MG: 2 INJECTION INTRAMUSCULAR; INTRAVENOUS at 23:47

## 2022-01-01 RX ADMIN — PROCHLORPERAZINE EDISYLATE 10 MG: 5 INJECTION INTRAMUSCULAR; INTRAVENOUS at 08:28

## 2022-01-01 RX ADMIN — GABAPENTIN 100 MG: 100 CAPSULE ORAL at 20:38

## 2022-01-01 RX ADMIN — HYDROCODONE BITARTRATE AND ACETAMINOPHEN 1 TABLET: 5; 325 TABLET ORAL at 21:25

## 2022-01-01 RX ADMIN — VALSARTAN 320 MG: 320 TABLET, FILM COATED ORAL at 10:08

## 2022-01-01 RX ADMIN — CALCIUM ACETATE MONOHYDRATE AND ALUMINUM SULFATE TETRADECAHYDRATE 1 PACKET: 952; 1347 POWDER, FOR SOLUTION TOPICAL at 14:56

## 2022-01-01 RX ADMIN — VALSARTAN 320 MG: 320 TABLET, FILM COATED ORAL at 21:19

## 2022-01-01 RX ADMIN — FLUOXETINE HYDROCHLORIDE 20 MG: 20 CAPSULE ORAL at 08:29

## 2022-01-01 RX ADMIN — PRAVASTATIN SODIUM 10 MG: 10 TABLET ORAL at 20:38

## 2022-01-01 RX ADMIN — ASPIRIN 81 MG: 81 TABLET, COATED ORAL at 09:46

## 2022-01-01 RX ADMIN — PANTOPRAZOLE SODIUM 40 MG: 40 TABLET, DELAYED RELEASE ORAL at 09:46

## 2022-01-01 RX ADMIN — CLONIDINE HYDROCHLORIDE 0.2 MG: 0.1 TABLET ORAL at 21:07

## 2022-01-01 RX ADMIN — GUAIFENESIN 400 MG: 200 TABLET ORAL at 06:15

## 2022-01-01 RX ADMIN — GABAPENTIN 100 MG: 100 CAPSULE ORAL at 20:09

## 2022-01-01 RX ADMIN — FLUCONAZOLE 100 MG: 100 TABLET ORAL at 13:43

## 2022-01-01 RX ADMIN — ASPIRIN 81 MG: 81 TABLET, COATED ORAL at 21:20

## 2022-01-01 RX ADMIN — CLONIDINE HYDROCHLORIDE 0.2 MG: 0.1 TABLET ORAL at 07:14

## 2022-01-01 RX ADMIN — ACETAMINOPHEN 650 MG: 325 TABLET ORAL at 17:25

## 2022-01-01 RX ADMIN — HYDRALAZINE HYDROCHLORIDE 100 MG: 50 TABLET, FILM COATED ORAL at 01:18

## 2022-01-01 RX ADMIN — TAZOBACTAM SODIUM AND PIPERACILLIN SODIUM 3.38 G: 375; 3 INJECTION, SOLUTION INTRAVENOUS at 11:55

## 2022-01-01 RX ADMIN — HYDRALAZINE HYDROCHLORIDE 100 MG: 50 TABLET, FILM COATED ORAL at 21:19

## 2022-01-01 RX ADMIN — INSULIN LISPRO 2 UNITS: 100 INJECTION, SOLUTION INTRAVENOUS; SUBCUTANEOUS at 18:54

## 2022-01-01 RX ADMIN — NITROGLYCERIN 1 INCH: 20 OINTMENT TOPICAL at 15:49

## 2022-01-01 RX ADMIN — SODIUM CHLORIDE 50 ML/HR: 9 INJECTION, SOLUTION INTRAVENOUS at 03:26

## 2022-01-01 RX ADMIN — GUAIFENESIN 400 MG: 200 TABLET ORAL at 20:38

## 2022-01-01 RX ADMIN — POTASSIUM CHLORIDE 20 MEQ: 10 TABLET, EXTENDED RELEASE ORAL at 08:24

## 2022-01-01 RX ADMIN — VANCOMYCIN HYDROCHLORIDE 500 MG: 500 INJECTION, POWDER, LYOPHILIZED, FOR SOLUTION INTRAVENOUS at 02:00

## 2022-01-01 RX ADMIN — FLUOXETINE HYDROCHLORIDE 20 MG: 20 CAPSULE ORAL at 09:51

## 2022-01-01 RX ADMIN — ACETAMINOPHEN 650 MG: 325 TABLET ORAL at 11:42

## 2022-01-01 RX ADMIN — ZINC OXIDE 1 APPLICATION: 200 OINTMENT TOPICAL at 16:18

## 2022-01-01 RX ADMIN — LANSOPRAZOLE 30 MG: KIT at 18:21

## 2022-01-01 RX ADMIN — POTASSIUM CHLORIDE 20 MEQ: 10 TABLET, EXTENDED RELEASE ORAL at 08:46

## 2022-01-01 RX ADMIN — HYDRALAZINE HYDROCHLORIDE 100 MG: 50 TABLET, FILM COATED ORAL at 15:48

## 2022-01-01 RX ADMIN — METOPROLOL TARTRATE 12.5 MG: 25 TABLET, FILM COATED ORAL at 21:13

## 2022-01-01 RX ADMIN — CLONIDINE HYDROCHLORIDE 0.1 MG: 0.1 TABLET ORAL at 16:06

## 2022-01-01 RX ADMIN — GUAIFENESIN 400 MG: 200 TABLET ORAL at 21:26

## 2022-01-01 RX ADMIN — SODIUM BICARBONATE 650 MG: 650 TABLET ORAL at 08:49

## 2022-01-01 RX ADMIN — HEPARIN SODIUM 4000 UNITS: 5000 INJECTION INTRAVENOUS; SUBCUTANEOUS at 10:29

## 2022-01-01 RX ADMIN — CLOPIDOGREL 75 MG: 75 TABLET, FILM COATED ORAL at 09:52

## 2022-01-01 RX ADMIN — SODIUM BICARBONATE 650 MG: 650 TABLET ORAL at 20:09

## 2022-01-01 RX ADMIN — METOPROLOL TARTRATE 12.5 MG: 25 TABLET, FILM COATED ORAL at 09:52

## 2022-01-01 RX ADMIN — TAZOBACTAM SODIUM AND PIPERACILLIN SODIUM 3.38 G: 375; 3 INJECTION, SOLUTION INTRAVENOUS at 18:54

## 2022-01-01 RX ADMIN — PRAVASTATIN SODIUM 10 MG: 10 TABLET ORAL at 21:31

## 2022-01-01 RX ADMIN — INSULIN LISPRO 5 UNITS: 100 INJECTION, SOLUTION INTRAVENOUS; SUBCUTANEOUS at 12:17

## 2022-01-01 RX ADMIN — CALCIUM ACETATE MONOHYDRATE AND ALUMINUM SULFATE TETRADECAHYDRATE 1 PACKET: 952; 1347 POWDER, FOR SOLUTION TOPICAL at 21:39

## 2022-01-01 RX ADMIN — TAZOBACTAM SODIUM AND PIPERACILLIN SODIUM 3.38 G: 375; 3 INJECTION, SOLUTION INTRAVENOUS at 22:55

## 2022-01-01 RX ADMIN — CEFTRIAXONE 1 G: 1 INJECTION, POWDER, FOR SOLUTION INTRAMUSCULAR; INTRAVENOUS at 17:09

## 2022-01-01 RX ADMIN — VALSARTAN 320 MG: 320 TABLET, FILM COATED ORAL at 09:51

## 2022-01-01 RX ADMIN — HYDROCODONE BITARTRATE AND ACETAMINOPHEN 1 TABLET: 5; 325 TABLET ORAL at 22:08

## 2022-01-01 RX ADMIN — LANSOPRAZOLE 30 MG: KIT at 09:04

## 2022-01-01 RX ADMIN — HEPARIN SODIUM 4000 UNITS: 5000 INJECTION INTRAVENOUS; SUBCUTANEOUS at 23:47

## 2022-01-01 RX ADMIN — GABAPENTIN 100 MG: 100 CAPSULE ORAL at 22:09

## 2022-01-01 RX ADMIN — VANCOMYCIN HYDROCHLORIDE 1500 MG: 10 INJECTION, POWDER, LYOPHILIZED, FOR SOLUTION INTRAVENOUS at 20:42

## 2022-01-01 RX ADMIN — CALCIUM ACETATE MONOHYDRATE AND ALUMINUM SULFATE TETRADECAHYDRATE 1 PACKET: 952; 1347 POWDER, FOR SOLUTION TOPICAL at 21:59

## 2022-01-01 RX ADMIN — CLONIDINE HYDROCHLORIDE 0.3 MG: 0.1 TABLET ORAL at 05:58

## 2022-01-01 RX ADMIN — TAZOBACTAM SODIUM AND PIPERACILLIN SODIUM 3.38 G: 375; 3 INJECTION, SOLUTION INTRAVENOUS at 11:50

## 2022-01-01 RX ADMIN — LANSOPRAZOLE 30 MG: KIT at 08:47

## 2022-01-01 RX ADMIN — CLONIDINE HYDROCHLORIDE 0.2 MG: 0.1 TABLET ORAL at 06:25

## 2022-01-01 RX ADMIN — CLONIDINE HYDROCHLORIDE 0.2 MG: 0.1 TABLET ORAL at 14:23

## 2022-01-01 RX ADMIN — HYDRALAZINE HYDROCHLORIDE 100 MG: 50 TABLET, FILM COATED ORAL at 06:25

## 2022-01-01 RX ADMIN — CALCIUM ACETATE MONOHYDRATE AND ALUMINUM SULFATE TETRADECAHYDRATE 1 PACKET: 952; 1347 POWDER, FOR SOLUTION TOPICAL at 08:30

## 2022-01-01 RX ADMIN — GUAIFENESIN 400 MG: 200 TABLET ORAL at 15:07

## 2022-01-01 RX ADMIN — FLUOXETINE HYDROCHLORIDE 20 MG: 20 CAPSULE ORAL at 08:45

## 2022-01-01 RX ADMIN — INSULIN GLARGINE-YFGN 5 UNITS: 100 INJECTION, SOLUTION SUBCUTANEOUS at 21:00

## 2022-01-01 RX ADMIN — HYDROCODONE BITARTRATE AND ACETAMINOPHEN 1 TABLET: 5; 325 TABLET ORAL at 06:05

## 2022-01-01 RX ADMIN — INSULIN GLARGINE-YFGN 10 UNITS: 100 INJECTION, SOLUTION SUBCUTANEOUS at 22:20

## 2022-01-01 RX ADMIN — POTASSIUM CHLORIDE 20 MEQ: 10 TABLET, EXTENDED RELEASE ORAL at 15:06

## 2022-01-01 RX ADMIN — PRAVASTATIN SODIUM 10 MG: 10 TABLET ORAL at 20:51

## 2022-01-01 RX ADMIN — ASPIRIN 81 MG: 81 TABLET, COATED ORAL at 15:04

## 2022-01-01 RX ADMIN — CLONIDINE HYDROCHLORIDE 0.1 MG: 0.1 TABLET ORAL at 15:06

## 2022-01-01 RX ADMIN — ACETAMINOPHEN 650 MG: 325 TABLET ORAL at 04:04

## 2022-01-01 RX ADMIN — NITROGLYCERIN 1 INCH: 20 OINTMENT TOPICAL at 20:11

## 2022-01-01 RX ADMIN — ASPIRIN 81 MG: 81 TABLET, CHEWABLE ORAL at 08:47

## 2022-01-01 RX ADMIN — CLONIDINE HYDROCHLORIDE 0.2 MG: 0.1 TABLET ORAL at 13:37

## 2022-01-01 RX ADMIN — FLUOXETINE HYDROCHLORIDE 20 MG: 20 CAPSULE ORAL at 08:20

## 2022-01-01 RX ADMIN — BARIUM SULFATE 65 ML: 960 POWDER, FOR SUSPENSION ORAL at 14:33

## 2022-01-01 RX ADMIN — LANSOPRAZOLE 30 MG: KIT at 07:18

## 2022-01-01 RX ADMIN — FLUOXETINE HYDROCHLORIDE 20 MG: 20 CAPSULE ORAL at 10:18

## 2022-01-01 RX ADMIN — INSULIN LISPRO 3 UNITS: 100 INJECTION, SOLUTION INTRAVENOUS; SUBCUTANEOUS at 11:50

## 2022-01-01 RX ADMIN — GABAPENTIN 100 MG: 100 CAPSULE ORAL at 20:48

## 2022-01-01 RX ADMIN — LANSOPRAZOLE 30 MG: KIT at 05:59

## 2022-01-01 RX ADMIN — ONDANSETRON 4 MG: 2 INJECTION INTRAMUSCULAR; INTRAVENOUS at 22:08

## 2022-01-01 RX ADMIN — INSULIN GLARGINE-YFGN 15 UNITS: 100 INJECTION, SOLUTION SUBCUTANEOUS at 21:40

## 2022-01-01 RX ADMIN — CLONIDINE HYDROCHLORIDE 0.2 MG: 0.1 TABLET ORAL at 07:18

## 2022-01-01 RX ADMIN — INSULIN LISPRO 4 UNITS: 100 INJECTION, SOLUTION INTRAVENOUS; SUBCUTANEOUS at 21:17

## 2022-01-01 RX ADMIN — INSULIN LISPRO 3 UNITS: 100 INJECTION, SOLUTION INTRAVENOUS; SUBCUTANEOUS at 08:50

## 2022-01-01 RX ADMIN — PRAVASTATIN SODIUM 10 MG: 10 TABLET ORAL at 21:22

## 2022-01-01 RX ADMIN — ONDANSETRON 4 MG: 2 INJECTION INTRAMUSCULAR; INTRAVENOUS at 11:15

## 2022-01-01 RX ADMIN — POTASSIUM CHLORIDE 20 MEQ: 1.5 POWDER, FOR SOLUTION ORAL at 17:21

## 2022-01-01 RX ADMIN — ASPIRIN 81 MG: 81 TABLET, COATED ORAL at 08:20

## 2022-01-01 RX ADMIN — GUAIFENESIN 400 MG: 200 TABLET ORAL at 05:34

## 2022-01-01 RX ADMIN — PANTOPRAZOLE SODIUM 40 MG: 40 TABLET, DELAYED RELEASE ORAL at 06:25

## 2022-01-01 RX ADMIN — SODIUM CHLORIDE 75 ML/HR: 9 INJECTION, SOLUTION INTRAVENOUS at 07:24

## 2022-01-01 RX ADMIN — PRAVASTATIN SODIUM 10 MG: 10 TABLET ORAL at 21:04

## 2022-01-01 RX ADMIN — LANSOPRAZOLE 30 MG: KIT at 18:24

## 2022-01-01 RX ADMIN — CLONIDINE HYDROCHLORIDE 0.2 MG: 0.1 TABLET ORAL at 21:19

## 2022-01-01 RX ADMIN — GABAPENTIN 100 MG: 100 CAPSULE ORAL at 21:05

## 2022-01-01 RX ADMIN — HYDRALAZINE HYDROCHLORIDE 50 MG: 50 TABLET, FILM COATED ORAL at 14:19

## 2022-01-01 RX ADMIN — INSULIN GLARGINE-YFGN 5 UNITS: 100 INJECTION, SOLUTION SUBCUTANEOUS at 21:18

## 2022-01-01 RX ADMIN — ZINC OXIDE 1 APPLICATION: 200 OINTMENT TOPICAL at 06:34

## 2022-01-01 RX ADMIN — INSULIN LISPRO 2 UNITS: 100 INJECTION, SOLUTION INTRAVENOUS; SUBCUTANEOUS at 22:47

## 2022-01-01 RX ADMIN — VALSARTAN 320 MG: 320 TABLET, FILM COATED ORAL at 09:20

## 2022-01-01 RX ADMIN — CEFTRIAXONE 1 G: 1 INJECTION, POWDER, FOR SOLUTION INTRAMUSCULAR; INTRAVENOUS at 16:16

## 2022-01-01 RX ADMIN — CLONIDINE HYDROCHLORIDE 0.2 MG: 0.1 TABLET ORAL at 20:50

## 2022-01-01 RX ADMIN — HYDROCODONE BITARTRATE AND ACETAMINOPHEN 1 TABLET: 5; 325 TABLET ORAL at 23:57

## 2022-01-01 RX ADMIN — GABAPENTIN 100 MG: 100 CAPSULE ORAL at 20:51

## 2022-01-01 RX ADMIN — AMLODIPINE BESYLATE 10 MG: 5 TABLET ORAL at 09:51

## 2022-01-01 RX ADMIN — INSULIN LISPRO 3 UNITS: 100 INJECTION, SOLUTION INTRAVENOUS; SUBCUTANEOUS at 17:44

## 2022-01-01 RX ADMIN — TAZOBACTAM SODIUM AND PIPERACILLIN SODIUM 3.38 G: 375; 3 INJECTION, SOLUTION INTRAVENOUS at 10:58

## 2022-01-01 RX ADMIN — GUAIFENESIN 400 MG: 200 TABLET ORAL at 21:31

## 2022-01-01 RX ADMIN — GUAIFENESIN 400 MG: 200 TABLET ORAL at 07:18

## 2022-01-01 RX ADMIN — GUAIFENESIN 600 MG: 600 TABLET, EXTENDED RELEASE ORAL at 17:33

## 2022-01-01 RX ADMIN — ATORVASTATIN CALCIUM 40 MG: 20 TABLET, FILM COATED ORAL at 20:48

## 2022-01-01 RX ADMIN — VALSARTAN 320 MG: 320 TABLET, FILM COATED ORAL at 09:48

## 2022-01-01 RX ADMIN — VANCOMYCIN HYDROCHLORIDE 500 MG: 500 INJECTION, POWDER, LYOPHILIZED, FOR SOLUTION INTRAVENOUS at 01:00

## 2022-01-01 RX ADMIN — AMLODIPINE BESYLATE 10 MG: 5 TABLET ORAL at 09:46

## 2022-01-01 RX ADMIN — Medication 10 ML: at 21:14

## 2022-01-01 RX ADMIN — AMLODIPINE BESYLATE 10 MG: 5 TABLET ORAL at 09:15

## 2022-01-01 RX ADMIN — ACETAMINOPHEN 650 MG: 325 TABLET ORAL at 10:14

## 2022-01-01 RX ADMIN — INSULIN LISPRO 2 UNITS: 100 INJECTION, SOLUTION INTRAVENOUS; SUBCUTANEOUS at 17:21

## 2022-01-01 RX ADMIN — HYDROCODONE BITARTRATE AND ACETAMINOPHEN 1 TABLET: 5; 325 TABLET ORAL at 16:20

## 2022-01-01 RX ADMIN — CLONIDINE HYDROCHLORIDE 0.3 MG: 0.1 TABLET ORAL at 15:58

## 2022-01-01 RX ADMIN — Medication 10 ML: at 09:20

## 2022-01-01 RX ADMIN — PANTOPRAZOLE SODIUM 40 MG: 40 TABLET, DELAYED RELEASE ORAL at 08:50

## 2022-01-01 RX ADMIN — TAZOBACTAM SODIUM AND PIPERACILLIN SODIUM 3.38 G: 375; 3 INJECTION, SOLUTION INTRAVENOUS at 19:14

## 2022-01-01 RX ADMIN — NITROGLYCERIN 1 INCH: 20 OINTMENT TOPICAL at 06:31

## 2022-01-01 RX ADMIN — HEPARIN SODIUM 12 UNITS/KG/HR: 10000 INJECTION, SOLUTION INTRAVENOUS at 14:40

## 2022-01-01 RX ADMIN — FLUOXETINE HYDROCHLORIDE 20 MG: 20 CAPSULE ORAL at 08:49

## 2022-01-01 RX ADMIN — CLONIDINE HYDROCHLORIDE 0.2 MG: 0.1 TABLET ORAL at 21:21

## 2022-01-01 RX ADMIN — CLOPIDOGREL 75 MG: 75 TABLET, FILM COATED ORAL at 08:50

## 2022-01-01 RX ADMIN — DILTIAZEM HYDROCHLORIDE 300 MG: 180 CAPSULE, COATED, EXTENDED RELEASE ORAL at 08:45

## 2022-01-01 RX ADMIN — TAZOBACTAM SODIUM AND PIPERACILLIN SODIUM 3.38 G: 375; 3 INJECTION, SOLUTION INTRAVENOUS at 03:27

## 2022-01-01 RX ADMIN — IOPAMIDOL 85 ML: 612 INJECTION, SOLUTION INTRAVENOUS at 18:29

## 2022-01-01 RX ADMIN — HYDRALAZINE HYDROCHLORIDE 10 MG: 20 INJECTION INTRAMUSCULAR; INTRAVENOUS at 21:35

## 2022-01-01 RX ADMIN — LANSOPRAZOLE 30 MG: KIT at 18:54

## 2022-01-01 RX ADMIN — GABAPENTIN 100 MG: 100 CAPSULE ORAL at 21:13

## 2022-01-01 RX ADMIN — Medication 10 ML: at 20:09

## 2022-01-01 RX ADMIN — PROCHLORPERAZINE EDISYLATE 10 MG: 5 INJECTION INTRAMUSCULAR; INTRAVENOUS at 14:11

## 2022-01-01 RX ADMIN — POTASSIUM CHLORIDE 40 MEQ: 1.5 POWDER, FOR SOLUTION ORAL at 18:24

## 2022-01-01 RX ADMIN — HYDROCODONE BITARTRATE AND ACETAMINOPHEN 1 TABLET: 5; 325 TABLET ORAL at 21:41

## 2022-01-01 RX ADMIN — DILTIAZEM HYDROCHLORIDE 300 MG: 180 CAPSULE, COATED, EXTENDED RELEASE ORAL at 10:08

## 2022-01-01 RX ADMIN — INSULIN LISPRO 2 UNITS: 100 INJECTION, SOLUTION INTRAVENOUS; SUBCUTANEOUS at 21:23

## 2022-01-01 RX ADMIN — SODIUM BICARBONATE 650 MG: 650 TABLET ORAL at 09:51

## 2022-01-01 RX ADMIN — CLONIDINE HYDROCHLORIDE 0.2 MG: 0.1 TABLET ORAL at 14:05

## 2022-01-01 RX ADMIN — GUAIFENESIN 600 MG: 600 TABLET, EXTENDED RELEASE ORAL at 08:45

## 2022-01-01 RX ADMIN — FLUOXETINE HYDROCHLORIDE 20 MG: 20 CAPSULE ORAL at 09:20

## 2022-01-01 RX ADMIN — CLONIDINE HYDROCHLORIDE 0.2 MG: 0.1 TABLET ORAL at 06:42

## 2022-01-01 RX ADMIN — CLONIDINE HYDROCHLORIDE 0.2 MG: 0.1 TABLET ORAL at 14:02

## 2022-01-01 RX ADMIN — CALCIUM ACETATE MONOHYDRATE AND ALUMINUM SULFATE TETRADECAHYDRATE 1 PACKET: 952; 1347 POWDER, FOR SOLUTION TOPICAL at 21:14

## 2022-01-01 RX ADMIN — HYDROCODONE BITARTRATE AND ACETAMINOPHEN 1 TABLET: 5; 325 TABLET ORAL at 05:37

## 2022-01-01 RX ADMIN — VALSARTAN 320 MG: 320 TABLET, FILM COATED ORAL at 09:16

## 2022-01-01 RX ADMIN — PANTOPRAZOLE SODIUM 40 MG: 40 TABLET, DELAYED RELEASE ORAL at 17:08

## 2022-01-01 RX ADMIN — CLONIDINE HYDROCHLORIDE 0.3 MG: 0.1 TABLET ORAL at 05:34

## 2022-01-01 RX ADMIN — METOPROLOL SUCCINATE 100 MG: 100 TABLET, EXTENDED RELEASE ORAL at 10:08

## 2022-01-01 RX ADMIN — SODIUM CHLORIDE 750 MG: 900 INJECTION, SOLUTION INTRAVENOUS at 21:49

## 2022-01-01 RX ADMIN — TAZOBACTAM SODIUM AND PIPERACILLIN SODIUM 3.38 G: 375; 3 INJECTION, SOLUTION INTRAVENOUS at 01:19

## 2022-01-01 RX ADMIN — CLONIDINE HYDROCHLORIDE 0.2 MG: 0.1 TABLET ORAL at 09:20

## 2022-01-01 RX ADMIN — POTASSIUM CHLORIDE 20 MEQ: 1.5 POWDER, FOR SOLUTION ORAL at 10:51

## 2022-01-01 RX ADMIN — ZINC OXIDE 1 APPLICATION: 200 OINTMENT TOPICAL at 08:45

## 2022-01-01 RX ADMIN — GUAIFENESIN 600 MG: 600 TABLET, EXTENDED RELEASE ORAL at 10:08

## 2022-01-01 RX ADMIN — POTASSIUM CHLORIDE 20 MEQ: 1.5 POWDER, FOR SOLUTION ORAL at 08:20

## 2022-01-01 RX ADMIN — HYDRALAZINE HYDROCHLORIDE 10 MG: 20 INJECTION INTRAMUSCULAR; INTRAVENOUS at 06:04

## 2022-01-01 RX ADMIN — GABAPENTIN 100 MG: 100 CAPSULE ORAL at 21:19

## 2022-01-01 RX ADMIN — CLONIDINE HYDROCHLORIDE 0.3 MG: 0.1 TABLET ORAL at 09:16

## 2022-01-01 RX ADMIN — CALCIUM ACETATE MONOHYDRATE AND ALUMINUM SULFATE TETRADECAHYDRATE 1 PACKET: 952; 1347 POWDER, FOR SOLUTION TOPICAL at 05:39

## 2022-01-01 RX ADMIN — HYDRALAZINE HYDROCHLORIDE 100 MG: 50 TABLET, FILM COATED ORAL at 13:37

## 2022-01-01 RX ADMIN — POTASSIUM CHLORIDE 20 MEQ: 10 TABLET, EXTENDED RELEASE ORAL at 21:19

## 2022-01-01 RX ADMIN — CLOPIDOGREL 75 MG: 75 TABLET, FILM COATED ORAL at 09:16

## 2022-01-01 RX ADMIN — GUAIFENESIN 200 MG: 200 SOLUTION ORAL at 16:07

## 2022-01-01 RX ADMIN — GABAPENTIN 100 MG: 100 CAPSULE ORAL at 21:25

## 2022-01-01 RX ADMIN — FENTANYL CITRATE 25 MCG: 50 INJECTION, SOLUTION INTRAMUSCULAR; INTRAVENOUS at 19:43

## 2022-01-01 RX ADMIN — NALOXONE HYDROCHLORIDE 0.2 MG: 0.4 INJECTION, SOLUTION INTRAMUSCULAR; INTRAVENOUS; SUBCUTANEOUS at 16:05

## 2022-01-01 RX ADMIN — TAZOBACTAM SODIUM AND PIPERACILLIN SODIUM 3.38 G: 375; 3 INJECTION, SOLUTION INTRAVENOUS at 18:21

## 2022-01-01 RX ADMIN — POTASSIUM CHLORIDE 40 MEQ: 10 TABLET, EXTENDED RELEASE ORAL at 14:29

## 2022-01-01 RX ADMIN — PRAVASTATIN SODIUM 10 MG: 10 TABLET ORAL at 21:17

## 2022-01-01 RX ADMIN — CLONIDINE HYDROCHLORIDE 0.1 MG: 0.1 TABLET ORAL at 21:31

## 2022-01-01 RX ADMIN — HYDRALAZINE HYDROCHLORIDE 50 MG: 50 TABLET, FILM COATED ORAL at 21:17

## 2022-01-01 RX ADMIN — METOPROLOL SUCCINATE 100 MG: 100 TABLET, EXTENDED RELEASE ORAL at 09:19

## 2022-01-01 RX ADMIN — CALCIUM ACETATE MONOHYDRATE AND ALUMINUM SULFATE TETRADECAHYDRATE 1 PACKET: 952; 1347 POWDER, FOR SOLUTION TOPICAL at 06:31

## 2022-01-01 RX ADMIN — INSULIN GLARGINE-YFGN 10 UNITS: 100 INJECTION, SOLUTION SUBCUTANEOUS at 21:25

## 2022-01-01 RX ADMIN — CLONIDINE HYDROCHLORIDE 0.2 MG: 0.1 TABLET ORAL at 14:18

## 2022-01-01 RX ADMIN — SODIUM CHLORIDE 75 ML/HR: 9 INJECTION, SOLUTION INTRAVENOUS at 14:40

## 2022-01-01 RX ADMIN — METOPROLOL TARTRATE 12.5 MG: 25 TABLET, FILM COATED ORAL at 08:50

## 2022-01-01 RX ADMIN — ASPIRIN 81 MG: 81 TABLET, CHEWABLE ORAL at 08:24

## 2022-01-01 RX ADMIN — ZINC OXIDE 1 APPLICATION: 200 OINTMENT TOPICAL at 13:40

## 2022-01-01 RX ADMIN — METOPROLOL TARTRATE 12.5 MG: 25 TABLET, FILM COATED ORAL at 09:14

## 2022-01-01 RX ADMIN — CALCIUM ACETATE MONOHYDRATE AND ALUMINUM SULFATE TETRADECAHYDRATE 1 PACKET: 952; 1347 POWDER, FOR SOLUTION TOPICAL at 05:45

## 2022-01-01 RX ADMIN — INSULIN LISPRO 5 UNITS: 100 INJECTION, SOLUTION INTRAVENOUS; SUBCUTANEOUS at 17:33

## 2022-01-01 RX ADMIN — CLONIDINE HYDROCHLORIDE 0.3 MG: 0.1 TABLET ORAL at 20:38

## 2022-01-01 RX ADMIN — VANCOMYCIN HYDROCHLORIDE 1500 MG: 10 INJECTION, POWDER, LYOPHILIZED, FOR SOLUTION INTRAVENOUS at 13:44

## 2022-01-01 RX ADMIN — INSULIN LISPRO 2 UNITS: 100 INJECTION, SOLUTION INTRAVENOUS; SUBCUTANEOUS at 11:40

## 2022-01-01 RX ADMIN — TAZOBACTAM SODIUM AND PIPERACILLIN SODIUM 3.38 G: 375; 3 INJECTION, SOLUTION INTRAVENOUS at 10:00

## 2022-01-01 RX ADMIN — HEPARIN SODIUM 4000 UNITS: 5000 INJECTION INTRAVENOUS; SUBCUTANEOUS at 14:40

## 2022-01-01 RX ADMIN — HYDRALAZINE HYDROCHLORIDE 100 MG: 50 TABLET, FILM COATED ORAL at 21:05

## 2022-01-01 RX ADMIN — HYDRALAZINE HYDROCHLORIDE 100 MG: 50 TABLET, FILM COATED ORAL at 08:49

## 2022-01-01 RX ADMIN — GUAIFENESIN 400 MG: 200 TABLET ORAL at 06:04

## 2022-01-01 RX ADMIN — LANSOPRAZOLE 30 MG: KIT at 18:35

## 2022-01-01 RX ADMIN — HYDRALAZINE HYDROCHLORIDE 25 MG: 25 TABLET, FILM COATED ORAL at 14:23

## 2022-01-01 RX ADMIN — VALSARTAN 320 MG: 320 TABLET, FILM COATED ORAL at 08:20

## 2022-01-01 RX ADMIN — FLUOXETINE HYDROCHLORIDE 20 MG: 20 CAPSULE ORAL at 08:47

## 2022-01-01 RX ADMIN — HYDRALAZINE HYDROCHLORIDE 100 MG: 50 TABLET, FILM COATED ORAL at 07:13

## 2022-01-01 RX ADMIN — FLUOXETINE HYDROCHLORIDE 20 MG: 20 CAPSULE ORAL at 10:08

## 2022-01-01 RX ADMIN — PERFLUTREN 2 ML: 6.52 INJECTION, SUSPENSION INTRAVENOUS at 15:40

## 2022-01-01 RX ADMIN — DOCUSATE SODIUM 100 MG: 100 CAPSULE, LIQUID FILLED ORAL at 17:44

## 2022-01-01 RX ADMIN — CLONIDINE HYDROCHLORIDE 0.3 MG: 0.1 TABLET ORAL at 21:25

## 2022-01-01 RX ADMIN — FLUOXETINE HYDROCHLORIDE 20 MG: 20 CAPSULE ORAL at 09:46

## 2022-01-01 RX ADMIN — HYDRALAZINE HYDROCHLORIDE 100 MG: 50 TABLET, FILM COATED ORAL at 13:42

## 2022-01-01 RX ADMIN — PANTOPRAZOLE SODIUM 40 MG: 40 TABLET, DELAYED RELEASE ORAL at 18:03

## 2022-01-01 RX ADMIN — INSULIN LISPRO 3 UNITS: 100 INJECTION, SOLUTION INTRAVENOUS; SUBCUTANEOUS at 15:07

## 2022-01-01 RX ADMIN — CLONIDINE HYDROCHLORIDE 0.3 MG: 0.1 TABLET ORAL at 21:41

## 2022-01-01 RX ADMIN — HYDRALAZINE HYDROCHLORIDE 100 MG: 50 TABLET, FILM COATED ORAL at 09:16

## 2022-01-01 RX ADMIN — VANCOMYCIN HYDROCHLORIDE 750 MG: 750 INJECTION, POWDER, LYOPHILIZED, FOR SOLUTION INTRAVENOUS at 23:31

## 2022-01-01 RX ADMIN — CLONIDINE HYDROCHLORIDE 0.2 MG: 0.1 TABLET ORAL at 08:50

## 2022-01-01 RX ADMIN — CLONIDINE HYDROCHLORIDE 0.2 MG: 0.1 TABLET ORAL at 05:54

## 2022-01-01 RX ADMIN — INSULIN LISPRO 2 UNITS: 100 INJECTION, SOLUTION INTRAVENOUS; SUBCUTANEOUS at 17:43

## 2022-01-01 RX ADMIN — GUAIFENESIN 400 MG: 200 TABLET ORAL at 05:59

## 2022-01-01 RX ADMIN — FENTANYL CITRATE 50 MCG: 50 INJECTION, SOLUTION INTRAMUSCULAR; INTRAVENOUS at 21:21

## 2022-01-01 RX ADMIN — INSULIN LISPRO 2 UNITS: 100 INJECTION, SOLUTION INTRAVENOUS; SUBCUTANEOUS at 09:52

## 2022-01-01 RX ADMIN — HYDROCODONE BITARTRATE AND ACETAMINOPHEN 1 TABLET: 5; 325 TABLET ORAL at 22:14

## 2022-01-01 RX ADMIN — INSULIN LISPRO 3 UNITS: 100 INJECTION, SOLUTION INTRAVENOUS; SUBCUTANEOUS at 12:44

## 2022-01-01 RX ADMIN — GABAPENTIN 100 MG: 100 CAPSULE ORAL at 21:31

## 2022-01-01 RX ADMIN — TAZOBACTAM SODIUM AND PIPERACILLIN SODIUM 3.38 G: 375; 3 INJECTION, SOLUTION INTRAVENOUS at 17:44

## 2022-01-01 RX ADMIN — CALCIUM ACETATE MONOHYDRATE AND ALUMINUM SULFATE TETRADECAHYDRATE 1 PACKET: 952; 1347 POWDER, FOR SOLUTION TOPICAL at 21:46

## 2022-01-01 RX ADMIN — CETIRIZINE HYDROCHLORIDE 10 MG: 10 TABLET ORAL at 09:20

## 2022-01-01 RX ADMIN — INSULIN LISPRO 2 UNITS: 100 INJECTION, SOLUTION INTRAVENOUS; SUBCUTANEOUS at 18:35

## 2022-01-01 RX ADMIN — HYDROCODONE BITARTRATE AND ACETAMINOPHEN 1 TABLET: 5; 325 TABLET ORAL at 06:25

## 2022-01-01 RX ADMIN — PANTOPRAZOLE SODIUM 40 MG: 40 TABLET, DELAYED RELEASE ORAL at 17:44

## 2022-01-01 RX ADMIN — SODIUM BICARBONATE 650 MG: 650 TABLET ORAL at 00:14

## 2022-01-01 RX ADMIN — CLONIDINE HYDROCHLORIDE 0.3 MG: 0.1 TABLET ORAL at 22:08

## 2022-01-01 RX ADMIN — GUAIFENESIN 400 MG: 200 TABLET ORAL at 21:16

## 2022-01-01 RX ADMIN — TAZOBACTAM SODIUM AND PIPERACILLIN SODIUM 3.38 G: 375; 3 INJECTION, SOLUTION INTRAVENOUS at 19:49

## 2022-01-01 RX ADMIN — TAZOBACTAM SODIUM AND PIPERACILLIN SODIUM 3.38 G: 375; 3 INJECTION, SOLUTION INTRAVENOUS at 03:03

## 2022-01-01 RX ADMIN — AMLODIPINE BESYLATE 10 MG: 5 TABLET ORAL at 21:19

## 2022-01-01 RX ADMIN — HYDROCODONE BITARTRATE AND ACETAMINOPHEN 1 TABLET: 5; 325 TABLET ORAL at 12:49

## 2022-01-01 RX ADMIN — CALCIUM ACETATE MONOHYDRATE AND ALUMINUM SULFATE TETRADECAHYDRATE 1 PACKET: 952; 1347 POWDER, FOR SOLUTION TOPICAL at 18:03

## 2022-01-01 RX ADMIN — NITROGLYCERIN 1 INCH: 20 OINTMENT TOPICAL at 00:14

## 2022-01-01 RX ADMIN — TAZOBACTAM SODIUM AND PIPERACILLIN SODIUM 3.38 G: 375; 3 INJECTION, SOLUTION INTRAVENOUS at 17:43

## 2022-01-01 RX ADMIN — AMLODIPINE BESYLATE 10 MG: 10 TABLET ORAL at 08:46

## 2022-01-01 RX ADMIN — METOPROLOL SUCCINATE 100 MG: 100 TABLET, EXTENDED RELEASE ORAL at 09:24

## 2022-01-01 RX ADMIN — FLUCONAZOLE 100 MG: 100 TABLET ORAL at 15:48

## 2022-01-01 RX ADMIN — HYDROMORPHONE HYDROCHLORIDE 0.5 MG: 1 INJECTION, SOLUTION INTRAMUSCULAR; INTRAVENOUS; SUBCUTANEOUS at 11:15

## 2022-01-01 RX ADMIN — ASPIRIN 81 MG: 81 TABLET, COATED ORAL at 08:34

## 2022-01-01 RX ADMIN — HYDROCODONE BITARTRATE AND ACETAMINOPHEN 1 TABLET: 5; 325 TABLET ORAL at 22:24

## 2022-01-01 RX ADMIN — PANTOPRAZOLE SODIUM 40 MG: 40 TABLET, DELAYED RELEASE ORAL at 07:13

## 2022-01-01 RX ADMIN — DILTIAZEM HYDROCHLORIDE 300 MG: 180 CAPSULE, COATED, EXTENDED RELEASE ORAL at 11:55

## 2022-01-01 RX ADMIN — FLUOXETINE HYDROCHLORIDE 20 MG: 20 CAPSULE ORAL at 08:24

## 2022-01-01 RX ADMIN — GUAIFENESIN 600 MG: 600 TABLET, EXTENDED RELEASE ORAL at 22:10

## 2022-01-01 RX ADMIN — ACETAMINOPHEN 650 MG: 325 TABLET ORAL at 08:29

## 2022-01-01 RX ADMIN — AMLODIPINE BESYLATE 10 MG: 5 TABLET ORAL at 08:49

## 2022-01-01 RX ADMIN — PRAVASTATIN SODIUM 10 MG: 10 TABLET ORAL at 21:14

## 2022-01-01 RX ADMIN — CLONIDINE HYDROCHLORIDE 0.2 MG: 0.1 TABLET ORAL at 21:16

## 2022-01-01 RX ADMIN — FLUOXETINE HYDROCHLORIDE 20 MG: 20 CAPSULE ORAL at 21:23

## 2022-01-01 RX ADMIN — FLUOXETINE HYDROCHLORIDE 20 MG: 20 CAPSULE ORAL at 08:46

## 2022-01-01 RX ADMIN — LANSOPRAZOLE 30 MG: KIT at 10:18

## 2022-01-01 RX ADMIN — METOPROLOL TARTRATE 12.5 MG: 25 TABLET, FILM COATED ORAL at 20:09

## 2022-01-01 RX ADMIN — INSULIN LISPRO 2 UNITS: 100 INJECTION, SOLUTION INTRAVENOUS; SUBCUTANEOUS at 12:49

## 2022-01-01 RX ADMIN — ASPIRIN 81 MG: 81 TABLET, CHEWABLE ORAL at 10:18

## 2022-01-01 RX ADMIN — PRAVASTATIN SODIUM 40 MG: 40 TABLET ORAL at 21:25

## 2022-01-01 RX ADMIN — HYDROMORPHONE HYDROCHLORIDE 0.5 MG: 1 INJECTION, SOLUTION INTRAMUSCULAR; INTRAVENOUS; SUBCUTANEOUS at 08:50

## 2022-01-01 RX ADMIN — PROCHLORPERAZINE EDISYLATE 10 MG: 5 INJECTION INTRAMUSCULAR; INTRAVENOUS at 07:41

## 2022-01-01 RX ADMIN — ATORVASTATIN CALCIUM 40 MG: 20 TABLET, FILM COATED ORAL at 20:09

## 2022-01-01 RX ADMIN — PRAVASTATIN SODIUM 40 MG: 40 TABLET ORAL at 21:41

## 2022-01-01 RX ADMIN — INSULIN LISPRO 2 UNITS: 100 INJECTION, SOLUTION INTRAVENOUS; SUBCUTANEOUS at 08:29

## 2022-01-01 RX ADMIN — TAZOBACTAM SODIUM AND PIPERACILLIN SODIUM 3.38 G: 375; 3 INJECTION, SOLUTION INTRAVENOUS at 18:35

## 2022-01-01 RX ADMIN — BARIUM SULFATE 55 ML: 0.81 POWDER, FOR SUSPENSION ORAL at 11:28

## 2022-01-01 RX ADMIN — CLONIDINE HYDROCHLORIDE 0.2 MG: 0.1 TABLET ORAL at 05:59

## 2022-03-16 PROBLEM — J18.9 PNEUMONIA OF LEFT LOWER LOBE DUE TO INFECTIOUS ORGANISM: Status: ACTIVE | Noted: 2022-01-01

## 2022-03-16 NOTE — ED PROVIDER NOTES
EMERGENCY DEPARTMENT ENCOUNTER    Room Number:  E655/1  Date of encounter:  3/17/2022  PCP: Rigoberto Law MD  Historian: Patient   Full history not obtainable due to: AMS    HPI:  Chief Complaint: N/V    Context: Gisela Gandara is a 89 y.o. female who presents to the ED c/o AMS onset this am. States she was sent by the facility because she was difficult to wake this am and her bg was very low. She is unsure what her BG read. She denies any n/v/d. No fever. Notes recent right toe amputation 2 weeks ago.     Sent for N/V [facility] nursing home. [facility]. No answer    MEDICAL RECORD REVIEW:Pt seen in the [facility] and 2nd toe amputation procedure noted on 3/4 due to osteomyelitis by Dr Tate. It does not appear she was admitted.       PAST MEDICAL HISTORY    Active Ambulatory Problems     Diagnosis Date Noted   • Diabetic ketoacidosis without coma associated with type 2 diabetes mellitus (HCC) 11/18/2020     Resolved Ambulatory Problems     Diagnosis Date Noted   • No Resolved Ambulatory Problems     Past Medical History:   Diagnosis Date   • Arthritis    • Diabetes mellitus (HCC)    • Hyperlipidemia    • Hypertension          PAST SURGICAL HISTORY  History reviewed. No pertinent surgical history.      FAMILY HISTORY  History reviewed. No pertinent family history.      SOCIAL HISTORY  Social History     Socioeconomic History   • Marital status:    Tobacco Use   • Smoking status: Former Smoker   • Smokeless tobacco: Former User         ALLERGIES  Patient has no known allergies.        REVIEW OF SYSTEMS  Review of Systems   All systems reviewed and marked as negative except as listed in HPI       PHYSICAL EXAM    I have reviewed the triage vital signs and nursing notes.    ED Triage Vitals   Temp Heart Rate Resp BP SpO2   03/16/22 1438 03/16/22 1438 03/16/22 1438 03/16/22 1438 03/16/22 1438   98.7 °F (37.1 °C) 84 18 (!) 195/80 100 %      Temp src Heart  Rate Source Patient Position BP Location FiO2 (%)   -- 03/16/22 1450 03/16/22 1450 03/16/22 1450 --    Monitor Lying Right arm        GENERAL: alert well developed, well nourished in no distress, obese. Elderly appearing.   HENT: NCAT, neck supple, trachea midline  EYES: no scleral icterus, PERRL, normal conjunctivae  CV: regular rhythm, regular rate, no murmur  RESPIRATORY: unlabored effort, CTAB  ABDOMEN: soft, nontender, nondistended, bowel sounds present  MUSCULOSKELETAL: no gross deformity. Right AKA. Stump intact. Left toe amputation incision site appears intact, midway down incision there is slight dehiscence but no active drainage or bleeding appreciated. Suture closure noted which appears intact. No erythema or warmth.   NEURO: alert,  sensory and motor function of extremities grossly intact, speech clear, mental status normal/baseline  SKIN: warm, dry, no rash  PSYCH:  Appropriate mood and affect    Vital signs and nursing notes reviewed.          LAB RESULTS  Recent Results (from the past 24 hour(s))   Comprehensive Metabolic Panel    Collection Time: 03/16/22  3:36 PM    Specimen: Blood   Result Value Ref Range    Glucose 142 (H) 65 - 99 mg/dL    BUN 22 8 - 23 mg/dL    Creatinine 1.03 (H) 0.57 - 1.00 mg/dL    Sodium 135 (L) 136 - 145 mmol/L    Potassium 4.3 3.5 - 5.2 mmol/L    Chloride 100 98 - 107 mmol/L    CO2 23.0 22.0 - 29.0 mmol/L    Calcium 9.1 8.2 - 9.6 mg/dL    Total Protein 7.4 6.0 - 8.5 g/dL    Albumin 2.90 (L) 3.50 - 5.20 g/dL    ALT (SGPT) 28 1 - 33 U/L    AST (SGOT) 39 (H) 1 - 32 U/L    Alkaline Phosphatase 223 (H) 39 - 117 U/L    Total Bilirubin 0.4 0.0 - 1.2 mg/dL    Globulin 4.5 gm/dL    A/G Ratio 0.6 g/dL    BUN/Creatinine Ratio 21.4 7.0 - 25.0    Anion Gap 12.0 5.0 - 15.0 mmol/L    eGFR 51.8 (L) >60.0 mL/min/1.73   Lipase    Collection Time: 03/16/22  3:36 PM    Specimen: Blood   Result Value Ref Range    Lipase 36 13 - 60 U/L   CBC Auto Differential    Collection Time: 03/16/22  3:36  PM    Specimen: Blood   Result Value Ref Range    WBC 17.54 (H) 3.40 - 10.80 10*3/mm3    RBC 4.74 3.77 - 5.28 10*6/mm3    Hemoglobin 13.6 12.0 - 15.9 g/dL    Hematocrit 42.1 34.0 - 46.6 %    MCV 88.8 79.0 - 97.0 fL    MCH 28.7 26.6 - 33.0 pg    MCHC 32.3 31.5 - 35.7 g/dL    RDW 13.9 12.3 - 15.4 %    RDW-SD 44.7 37.0 - 54.0 fl    MPV 9.7 6.0 - 12.0 fL    Platelets 387 140 - 450 10*3/mm3    Neutrophil % 74.3 42.7 - 76.0 %    Lymphocyte % 15.3 (L) 19.6 - 45.3 %    Monocyte % 9.5 5.0 - 12.0 %    Eosinophil % 0.1 (L) 0.3 - 6.2 %    Basophil % 0.2 0.0 - 1.5 %    Immature Grans % 0.6 (H) 0.0 - 0.5 %    Neutrophils, Absolute 13.04 (H) 1.70 - 7.00 10*3/mm3    Lymphocytes, Absolute 2.69 0.70 - 3.10 10*3/mm3    Monocytes, Absolute 1.67 (H) 0.10 - 0.90 10*3/mm3    Eosinophils, Absolute 0.01 0.00 - 0.40 10*3/mm3    Basophils, Absolute 0.03 0.00 - 0.20 10*3/mm3    Immature Grans, Absolute 0.10 (H) 0.00 - 0.05 10*3/mm3    nRBC 0.0 0.0 - 0.2 /100 WBC   Green Top (Gel)    Collection Time: 03/16/22  3:36 PM   Result Value Ref Range    Extra Tube Hold for add-ons.    Lavender Top    Collection Time: 03/16/22  3:36 PM   Result Value Ref Range    Extra Tube hold for add-on    Gold Top - SST    Collection Time: 03/16/22  3:36 PM   Result Value Ref Range    Extra Tube Hold for add-ons.    Light Blue Top    Collection Time: 03/16/22  3:36 PM   Result Value Ref Range    Extra Tube hold for add-on    Beta Hydroxybutyrate Quantitative    Collection Time: 03/16/22  3:36 PM    Specimen: Blood   Result Value Ref Range    Beta-Hydroxybutyrate Quant 0.544 (H) 0.020 - 0.270 mmol/L   Procalcitonin    Collection Time: 03/16/22  3:36 PM    Specimen: Blood   Result Value Ref Range    Procalcitonin 3.40 (H) 0.00 - 0.25 ng/mL   Lactic Acid, Plasma    Collection Time: 03/16/22  6:46 PM    Specimen: Blood   Result Value Ref Range    Lactate 1.7 0.5 - 2.0 mmol/L   COVID-19,[facility]-HOU[facility]BERT NP SWAB IN TRANSPORT MEDIA 8-12 HR TAT - Swab,  Nasopharynx    Collection Time: 03/16/22  7:50 PM    Specimen: Nasopharynx; Swab   Result Value Ref Range    COVID19 Not Detected Not Detected - Ref. Range   Urinalysis With Microscopic If Indicated (No Culture) - Urine, Clean Catch    Collection Time: 03/16/22  8:45 PM    Specimen: Urine, Clean Catch   Result Value Ref Range    Color, UA Yellow Yellow, Straw    Appearance, UA Turbid (A) Clear    pH, UA <=5.0 5.0 - 8.0    Specific Gravity, UA 1.025 1.005 - 1.030    Glucose, UA Negative Negative    Ketones, UA Negative Negative    Bilirubin, UA Negative Negative    Blood, UA Negative Negative    Protein,  mg/dL (2+) (A) Negative    Leuk Esterase, UA Small (1+) (A) Negative    Nitrite, UA Negative Negative    Urobilinogen, UA 0.2 E.U./dL 0.2 - 1.0 E.U./dL   Urinalysis, Microscopic Only - Urine, Clean Catch    Collection Time: 03/16/22  8:45 PM    Specimen: Urine, Clean Catch   Result Value Ref Range    RBC, UA None Seen None Seen, 0-2 /HPF    WBC, UA 6-12 (A) None Seen, 0-2 /HPF    Bacteria, UA 4+ (A) None Seen /HPF    Squamous Epithelial Cells, UA 3-6 (A) None Seen, 0-2 /HPF    Hyaline Casts, UA None Seen None Seen /LPF    Uric Acid Crystals, UA Moderate/2+ None Seen /HPF    Methodology Manual Light Microscopy    POC Glucose Once    Collection Time: 03/17/22 10:55 AM    Specimen: Blood   Result Value Ref Range    Glucose 223 (H) 70 - 130 mg/dL       Ordered the above labs and independently reviewed the results.        RADIOLOGY  CT Abdomen Pelvis With Contrast, CT Chest With Contrast Diagnostic    Result Date: 3/16/2022  CT OF THE CHEST, ABDOMEN AND PELVIS WITH CONTRAST 03/16/2022  HISTORY: Leukocytosis. Evaluate for possible infection.  TECHNIQUE: Axial images were obtained from the lung apices to the symphysis pubis after intravenous contrast. No oral contrast was given.   FINDINGS: In the left lower lobe is moderate patchy infiltrate. Minimal infiltrate, atelectasis or scar is seen in the right lower lobe.  Some of the areas of infiltrate in the left lower lobe have a slightly nodular appearance. There is some aortic and coronary calcification. Degenerative changes of the bilateral shoulders is seen most severe in the right though the left shoulder is incompletely included in the field-of-view.  No pathologically enlarged lymph nodes are seen.  At least 4 or 5 low-density liver lesions are seen compatible with hepatic cysts. Granulomatous calcifications are seen in the liver and spleen.  Gallbladder has been removed. Pancreas is atrophic. Adrenal glands are not enlarged. At least 3 right renal cysts are seen including a moderately large 5 cm right renal cyst.  There is some aortoiliac calcification. Uterus has been removed. Urinary bladder is unremarkable.  No free air is seen. No bowel wall thickening is seen. A few of the segments of small bowel are at the upper limits of normal for diameter.      1. Moderate left lower lobe pneumonia. 2. Hepatic and renal cysts. 3. Small bowel segments are at the upper limits of normal for diameter. This may be related to mild ileus. It is unlikely to represent a low-grade partial small bowel obstruction but if clinically indicated, follow-up plain radiographs may be helpful.  Radiation dose reduction techniques were utilized, including automated exposure control and exposure modulation based on body size.  This report was finalized on 3/16/2022 10:40 PM by Dr. Rush Millan M.D.        I ordered the above noted radiological studies. Independently reviewed by me and discussed with radiologist.  See dictation above for official radiology interpretation.      PROCEDURES    Procedures        MEDICATIONS GIVEN IN ER    Medications   sodium chloride 0.9 % flush 10 mL (10 mL Intravenous Given 3/17/22 4618)   piperacillin-tazobactam (ZOSYN) 3.375 g in iso-osmotic dextrose 50 ml (premix) (3.375 g Intravenous New Bag 3/17/22 1057)   insulin lispro (ADMELOG) injection 0-7 Units (0 Units  Subcutaneous Not Given 3/17/22 0800)   metoprolol succinate XL (TOPROL-XL) 24 hr tablet 100 mg (100 mg Oral Given 3/17/22 0919)   gabapentin (NEURONTIN) capsule 100 mg (100 mg Oral Given 3/17/22 0252)   FLUoxetine (PROzac) capsule 20 mg (20 mg Oral Given 3/17/22 0920)   guaifenesin (ROBITUSSIN) 100 MG/5ML liquid 200 mg (has no administration in time range)   melatonin tablet 3 mg (has no administration in time range)   acetaminophen (TYLENOL) tablet 500 mg (has no administration in time range)   valsartan (DIOVAN) tablet 320 mg (320 mg Oral Given 3/17/22 0920)   HYDROcodone-acetaminophen (NORCO) 5-325 MG per tablet 1 tablet (has no administration in time range)   dilTIAZem CD (CARDIZEM CD) 24 hr capsule 300 mg (300 mg Oral Given 3/17/22 0920)   cloNIDine (CATAPRES) tablet 0.3 mg (has no administration in time range)   pravastatin (PRAVACHOL) tablet 40 mg (has no administration in time range)   Pharmacy to dose vancomycin (has no administration in time range)   pantoprazole (PROTONIX) EC tablet 40 mg (has no administration in time range)   iopamidol (ISOVUE-300) 61 % injection 100 mL (85 mL Intravenous Given by Other 3/16/22 1829)   piperacillin-tazobactam (ZOSYN) 3.375 g in iso-osmotic dextrose 50 ml (premix) (0 g Intravenous Stopped 3/16/22 2019)   fentaNYL citrate (PF) (SUBLIMAZE) injection 25 mcg (25 mcg Intravenous Given 3/16/22 1943)   vancomycin 1500 mg/500 mL 0.9% NS IVPB ([facility]) (1,500 mg Intravenous New Bag 3/16/22 2042)   fentaNYL citrate (PF) (SUBLIMAZE) injection 50 mcg (50 mcg Intravenous Given 3/16/22 2121)         PROGRESS, DATA ANALYSIS, CONSULTS, AND MEDICAL DECISION MAKING    All labs have been independently reviewed by me.  All radiology studies have been reviewed by me.   EKG's independently reviewed by me.  Discussion below represents my analysis of pertinent findings related to patient's condition, differential diagnosis, treatment plan and final disposition.    DIFFERENTIAL DIAGNOSIS INCLUDE BUT  NOT LIMITED TO: DKA, sepsis, Gastroenteritis, flatus, appendicitis, pancreatitis, renal colic, nephrolithiasis, renal infarct, splenic infarct, mesenteric ischemia, perforated bowel, SBO, diverticulitis, colitis, abdominal wall pain, muscle spasm, IBS, biliary colic, cholecystitis      ED Course    Wed Mar 16, 2022   1623 Beta-Hydroxybutyrate Quant(!): 0.544 [JS]   1623 WBC(!): 17.54 [JS]   1658 Creatinine(!): 1.03 [JS]   1659 Sodium(!): 135 [JS]   1758 Procalcitonin(!): 3.40 [MM]   1758 Beta-Hydroxybutyrate Quant(!): 0.544 [MM]   1758 WBC(!): 17.54 [MM]   1758 Creatinine(!): 1.03  Mild worsening of creatinine. [MM]   1759 Alkaline Phosphatase(!): 223  T. Umaña is normal as well as ALT.  AST is minimally elevated. [MM]   1929 Discussed pt with Dr Millan radiologist regarding pt imaging of chest and abdomen. Pt has LLQ pneumonia. Dilated loops of bowel may represent ileus. No definite signs of obstruction. [JS]   1933 I reviewed the CT scan of the chest abdomen pelvis result.  Patient has a moderate left lower lobe pneumonia as well as some renal and hepatic cyst.  Her small bowel is at the upper limits of normal which could suggest an ileus or potentially early small bowel obstruction.  Please see complete dictated report from the radiologist. [MM]   1934 I did talk with the patient as well as the daughter in the room and reviewed the notes from the nursing home.  Patient is a DO NOT RESUSCITATE.  No CPR and no intubation if became necessary. [MM]   2103 I discussed the case with Dr. Law.  Informed him of the patient's presenting symptoms and results of the test.  Agrees to admit the patient to the hospital.  All questions answered. [MM]      ED Course User Index  [JS] Gabriella Bradford APRN  [MM] Thanh Avery MD       AS OF 11:13 EDT VITALS:        BP - (!) 189/93  HR - 66  TEMP - 97.6 °F (36.4 °C) (Oral)  O2 SATS - 98%        DIAGNOSIS  Final diagnoses:   Pneumonia of left lower lobe due to infectious  organism   Leukocytosis, unspecified type   Nausea and vomiting in adult   Elevated procalcitonin   Ileus (HCC)         DISPOSITION  Admission     Pt masked in first look. I wore appropriate PPE throughout my encounters with the pt. I performed hand hygiene on entry into the pt room and upon exit.     Dictated utilizing Dragon dictation:  Much of this encounter note is an electronic transcription/translation of spoken language to printed text.      Gabriella Bradford, APRN  03/17/22 1114

## 2022-03-16 NOTE — ED NOTES
"Pt arrives from [facility] via EMS with c/o n/v x2 days. NH reports her blood sugar has been \"high and low\". BS checked by EMS and was 123. A&ox4, abc's intact, NAD noted.    Patient wearing mask during triage. RN wearing appropriate PPE during triage. Hand hygiene performed.    "

## 2022-03-16 NOTE — ED PROVIDER NOTES
I supervised care provided by the midlevel provider.   We have discussed this patient's history, physical exam, and treatment plan.  I have reviewed the note and personally saw and examined the patient and agree with the plan of care.   I spoke with the patient as well as the patient's daughter in the room.  According to the history provided by the patient and the patient's daughter today there was report that the patient had a low blood pressure and low glucose.  The daughter the patient is unaware of the exact numbers of those readings.  She was given oral glucose and orange juice with improvement of her symptoms.  They called EMS and transferred the patient to the emergency department.  There is no report of any fever.  Patient denies any pain anywhere.  Arlene Bradford the midlevel provider is attempted to call [facility] where the patient stays and she is unable to get a history concerning this patient and what occurred prior to arrival here.  Patient denies any complaints.  Denies any pain anywhere.  No chest pain or shortness of breath.  She is a little forgetful and she is elderly.  Daughter states that she is acting at baseline.  Daughter states that she has been under lots of stress and not eating or drinking as much.  Daughter also states that that she had failed a swallowing study recently.  She had recent surgery to her left foot with toe removal because of an infection.    GENERAL: Elderly female that appears chronically ill, not distressed.  Does not appear septic or toxic.  HENT: nares patent  Head/neck/ face are symmetric without gross deformity or swelling  EYES: no scleral icterus  CV: regular rhythm, regular rate with intact distal pulses  RESPIRATORY: normal effort and no respiratory distress.  Clear to auscultation bilaterally  ABDOMEN: soft and nontender.  Morbidly obese.  MUSCULOSKELETAL: Status post right AKA.  Stump and lower extremity on the right appears clear without tenderness or signs of  infection.  To her left lower extremity she is status post amputation of her first toe which looks old and a relatively recent amputation of her second toe.  Sutures are in place.  The incision looks clean dry and intact.  There is no tenderness to palpation.  She has no coolness, crepitance, pallor to her lower extremity.  Palpation of the lower extremity diffusely is nontender without crepitance or warmth and without pain.  NEURO: alert and appropriate, moves all extremities, follows commands.  Patient is alert to her name.  She is a little forgetful and has a hard time remembering her exact age but is aware of the month.  She has no focal motor or sensory changes on exam.  SKIN: warm, dry    Vital signs and nursing notes reviewed.    Plan I reviewed the patient's lab work.  I am concerned she has an occult infection somewhere with elevation of her white blood cell count and pro calcitonin.  But her incision and wound to her left foot looks good and appears to be healing well.  We will check a scan of her chest abdomen pelvis and the urinalysis is pending.  We will start the patient on some broad-spectrum antibiotics.  I discussed this and informed the patient as well as the daughter in the room.  I also discussed this with Arlene who is the midlevel provider.  All questions answered at this time.  Her blood pressure is elevated.  Rest of her vital signs are unremarkable.    We are currently under a pandemic from the COVID19 infection.  The patient presented to the emergency department by ambulance or personal vehicle. I followed the current protocols required by Infection Control at [facility] in my evaluation and treatment of the patient. The patient was wearing a face mask during my evaluation and throughout my encounter. During my whole encounter with this patient I used appropriate personal protective equipment.  This equipment consisted of eye protection, facemask, gown, and gloves.  I applied  this equipment before entering the room.      ED Course as of 03/16/22 2315   Wed Mar 16, 2022   1623 Beta-Hydroxybutyrate Quant(!): 0.544 [JS]   1623 WBC(!): 17.54 [JS]   1658 Creatinine(!): 1.03 [JS]   1659 Sodium(!): 135 [JS]   1758 Procalcitonin(!): 3.40 [MM]   1758 Beta-Hydroxybutyrate Quant(!): 0.544 [MM]   1758 WBC(!): 17.54 [MM]   1758 Creatinine(!): 1.03  Mild worsening of creatinine. [MM]   1759 Alkaline Phosphatase(!): 223  T. Umaña is normal as well as ALT.  AST is minimally elevated. [MM]   1929 Discussed pt with Dr Millan radiologist regarding pt imaging of chest and abdomen. Pt has LLQ pneumonia. Dilated loops of bowel may represent ileus. No definite signs of obstruction. [JS]   1933 I reviewed the CT scan of the chest abdomen pelvis result.  Patient has a moderate left lower lobe pneumonia as well as some renal and hepatic cyst.  Her small bowel is at the upper limits of normal which could suggest an ileus or potentially early small bowel obstruction.  Please see complete dictated report from the radiologist. [MM]   1934 I did talk with the patient as well as the daughter in the room and reviewed the notes from the nursing home.  Patient is a DO NOT RESUSCITATE.  No CPR and no intubation if became necessary. [MM]   2103 I discussed the case with Dr. Law.  Informed him of the patient's presenting symptoms and results of the test.  Agrees to admit the patient to the hospital.  All questions answered. [MM]      ED Course User Index  [JS] Gabriella Bradford APRN  [MM] Thanh Avery MD Molinari, Mark, MD  03/16/22 2318

## 2022-03-17 NOTE — CONSULTS
"Adult Nutrition  Assessment/PES    Patient Name:  Gisela Gandara  YOB: 1932  MRN: [id number]  Admit Date:  3/16/2022    Assessment Date:  3/17/2022    Comments:  Nutrition consult due to MST score of 2 per nurse admission screen.  Admitted from NH with AMS, N/V, BG very low.  Recent R toe amputation 2 weeks ago 3/4.      No PO intake available.  Patient reports appetite doing pretty well at this time.  No family at bedside at time of visit.    Adding Boost GC daily to help promote kcal and protein intake.    RD to continue to follow.     Reason for Assessment     Row Name 03/17/22 1331          Reason for Assessment    Reason For Assessment nurse/nurse practitioner consult;identified at risk by screening criteria     Diagnosis other (see comments);diabetes diagnosis/complications;cardiac disease;neurologic conditions;pulmonary disease  arthritis, DM, HLD, HTN; adm with AMS, BG very low at facility, s/p recent R toe amputation 2 weeks ago     Identified At Risk by Screening Criteria MST SCORE 2+;unintentional loss of 10 lbs or more in the past 2 mos                Nutrition/Diet History     Row Name 03/17/22 1336          Nutrition/Diet History    Typical Intake (Food/Fluid/EN/PN) pt reports jaqueline pretty good; per chart, pt not eating/drinking as much                Anthropometrics     Row Name 03/17/22 1346          Anthropometrics    Height 157.5 cm (62.01\")     Weight 69.3 kg (152 lb 12.5 oz)     Weight for Calculation 69.3 kg (152 lb 12.5 oz)            Ideal Body Weight (IBW)    Retired Ideal Body Weight (IBW) (kg) 50.45     Retired % Ideal Body Weight 137.36            Retired Body Mass Index (BMI)    Retired BMI (kg/m2) 27.99                Labs/Tests/Procedures/Meds     Row Name 03/17/22 1342          Labs/Procedures/Meds    Lab Results Reviewed reviewed, pertinent     Lab Results Comments Gluc, Creat, BUN, GFR            Diagnostic Tests/Procedures    Diagnostic Test/Procedure Reviewed " "reviewed, pertinent            Medications    Pertinent Medications Reviewed reviewed, pertinent     Pertinent Medications Comments lantus, protonix, admelog, vanc, IVFs                Physical Findings     Row Name 03/17/22 1343          Physical Findings    Overall Physical Appearance B=9, healed coccyx PI                Estimated/Assessed Needs - Anthropometrics     Row Name 03/17/22 1346          Anthropometrics    Height 157.5 cm (62.01\")     Weight 69.3 kg (152 lb 12.5 oz)     Weight for Calculation 69.3 kg (152 lb 12.5 oz)            Estimated/Assessed Needs           KCAL/KG    KCAL/KG 20 Kcal/Kg (kcal);25 Kcal/Kg (kcal)     20 Kcal/Kg (kcal) 1386     25 Kcal/Kg (kcal) 1732.5            Protein Requirements    Weight Used For Protein Calculations 69.3 kg (152 lb 12.5 oz)     Est Protein Requirement Amount (gms/kg) 1.0 gm protein     Estimated Protein Requirements (gms/day) 69.3            Fluid Requirements    Fluid Requirements (mL/day) 1700                Nutrition Prescription Ordered     Row Name 03/17/22 1348          Nutrition Prescription PO    Current PO Diet Full Liquid     Common Modifiers Consistent Carbohydrate                Evaluation of Received Nutrient/Fluid Intake     Row Name 03/17/22 1349          PO Evaluation    Number of Days PO Intake Evaluated Insufficient Data               Problem/Interventions:   Problem 1     Row Name 03/17/22 1349          Nutrition Diagnoses Problem 1    Problem 1 Predicted Suboptimal Intake     Etiology (related to) Factors Affecting Nutrition     Mental State/Condition Confusion  adm with AMS     Signs/Symptoms (evidenced by) Report/Observation                Intervention Goal     Row Name 03/17/22 1353          Intervention Goal    General Maintain nutrition;Disease management/therapy;Meet nutritional needs for age/condition     PO Establish PO;Tolerate PO;PO intake (%)     PO Intake % 75 %     Weight No significant weight loss                Nutrition " Intervention     Row Name 03/17/22 1354          Nutrition Intervention    RD/Tech Action Follow Tx progress;Care plan reviewd;Recommend/ordered     Recommended/Ordered Supplement                Nutrition Prescription     Row Name 03/17/22 1354          Nutrition Prescription PO    PO Prescription Begin/change supplement     Supplement Boost Glucose Control     Supplement Frequency Daily     New PO Prescription Ordered? Yes                Education/Evaluation     Row Name 03/17/22 1357          Education    Education Will Instruct as appropriate            Monitor/Evaluation    Monitor Per protocol;PO intake;Supplement intake;Pertinent labs;Weight;Skin status;Symptoms                 Electronically signed by:  Marilia Low RD  03/17/22 13:55 EDT

## 2022-03-17 NOTE — DISCHARGE PLACEMENT REQUEST
"Marcella Oliver (89 y.o. Female)             Date of Birth   01/28/1932    Social Security Number       Address   [facility] [address] [place] [address]    Home Phone   [phone]    MRN   [id number]       Islam   None    Marital Status                               Admission Date   3/16/22    Admission Type   Emergency    Admitting Provider   Rigoberto Law MD    Attending Provider   Rigoberto Law MD    Department, Room/Bed   [address], E655/1       Discharge Date       Discharge Disposition       Discharge Destination                               Attending Provider: Rigoberto Law MD    Allergies: No Known Allergies    Isolation: None   Infection: None   Code Status: Prior   Advance Care Planning Activity    Ht: 157.5 cm (62\")   Wt: 69.3 kg (152 lb 12.5 oz)    Admission Cmt: None   Principal Problem: None                Active Insurance as of 3/16/2022     Primary Coverage     Payor Plan Insurance Group Employer/Plan Group    ANTHEM MEDICARE REPLACEMENT ANTHEM MEDICARE ADVANTAGE [id number]     Payor Plan Address Payor Plan Phone Number Payor Plan Fax Number Effective Dates    [address] [phone]  1/1/2018 - None Entered    [facility] [address]       Subscriber Name Subscriber Birth Date Member ID       BENITOMARCELLA JOSÉ MIGUEL 1/28/1932 [id number]           Secondary Coverage     Payor Plan Insurance Group Employer/Plan Group    [place]      Payor Plan Address Payor Plan Phone Number Payor Plan Fax Number Effective Dates    [address] [phone]  11/18/2020 - None Entered    [facility] [id number]       Subscriber Name Subscriber Birth Date Member ID       BENITOMARCELLA JOSÉ MIGUEL 1/28/1932 [id number]                 Emergency Contacts      (Rel.) Home Phone Work Phone Mobile Phone    JAYDEN OLIVER (Daughter) [phone] -- [phone]    KAMILLE VALENCIA (Daughter) [phone] -- [phone]              "

## 2022-03-17 NOTE — PLAN OF CARE
"Goal Outcome Evaluation:  Plan of Care Reviewed With: patient, daughter           Outcome Evaluation: Clinical swallow evaluation completed.  Pt initially c/o food sticking in chest.  When questioned regarding specific foods stated everything 'went down' (dtr stated \"she will tell you everything is ok').  Pt trialed with thin by cup/straw,puree, soft/hard solids, and mixed consistency.  No s/s aspiration with any consistency.  Mild to moderate residue unable to be cleared independently with dry solid.  Adequate mastication of soft solids.  Reported tongue 'burning' with diet Pepsi and poor taste of water.  Denied sticking in chest, but belching noted after liquid wash.  Oral dysphagia characterized by slow transit and oral residue with dry consistencies.  Pt reporting particular preferences such as \"mashed potatoes, but only if hot\"dislike of puree or ground meats.  From oropharyngeal standpoint, pt appears safe for soft moist, ground meat diet with thin liquids; however, rec r/o esophageal impairment prior to solid diet. Given LLL pna, reports of food sticking midsternum recommend consideration of Gi consult and/or esophagram to r/o abnormalites to esophageal structure and function.  "

## 2022-03-17 NOTE — ED NOTES
Nursing report ED to floor  Gisela Gandara  89 y.o.  female    HPI :   Chief Complaint   Patient presents with   • Vomiting   • Nausea       Admitting doctor:   Rigoberto Law MD    Admitting diagnosis:   The primary encounter diagnosis was Pneumonia of left lower lobe due to infectious organism. Diagnoses of Leukocytosis, unspecified type, Nausea and vomiting in adult, Elevated procalcitonin, and Ileus (HCC) were also pertinent to this visit.    Code status:   Current Code Status     Date Active Code Status Order ID Comments User Context       Prior    Advance Care Planning Activity          Allergies:   Patient has no known allergies.    Intake and Output    Intake/Output Summary (Last 24 hours) at 3/16/2022 2129  Last data filed at 3/16/2022 2019  Gross per 24 hour   Intake 50 ml   Output --   Net 50 ml       Weight:       03/16/22  1540   Weight: 72.6 kg (160 lb)       Most recent vitals:   Vitals:    03/16/22 1724 03/16/22 1745 03/16/22 1814 03/16/22 2124   BP:    140/91   BP Location:       Patient Position:       Pulse: 84 81 85 79   Resp:    18   Temp:       SpO2: 97% 96% 96% 94%   Weight:       Height:           Active LDAs/IV Access:   Lines, Drains & Airways     Active LDAs     Name Placement date Placement time Site Days    Peripheral IV 03/16/22 1756 Right;Upper Arm 03/16/22  1756  Arm  less than 1                Labs (abnormal labs have a star):   Labs Reviewed   COMPREHENSIVE METABOLIC PANEL - Abnormal; Notable for the following components:       Result Value    Glucose 142 (*)     Creatinine 1.03 (*)     Sodium 135 (*)     Albumin 2.90 (*)     AST (SGOT) 39 (*)     Alkaline Phosphatase 223 (*)     eGFR 51.8 (*)     All other components within normal limits    Narrative:     GFR Normal >60  Chronic Kidney Disease <60  Kidney Failure <15     URINALYSIS W/ MICROSCOPIC IF INDICATED (NO CULTURE) - Abnormal; Notable for the following components:    Appearance, UA Turbid (*)     Protein,  mg/dL (2+)  "(*)     Leuk Esterase, UA Small (1+) (*)     All other components within normal limits   CBC WITH AUTO DIFFERENTIAL - Abnormal; Notable for the following components:    WBC 17.54 (*)     Lymphocyte % 15.3 (*)     Eosinophil % 0.1 (*)     Immature Grans % 0.6 (*)     Neutrophils, Absolute 13.04 (*)     Monocytes, Absolute 1.67 (*)     Immature Grans, Absolute 0.10 (*)     All other components within normal limits   BETA HYDROXYBUTYRATE QUANTITATIVE - Abnormal; Notable for the following components:    Beta-Hydroxybutyrate Quant 0.544 (*)     All other components within normal limits    Narrative:     In the assessment of possible diabetic ketoacidosis, the test should be interpreted along with other clinical and laboratory findings.  A level greater than 1 mmol/L should require further evaluation and levels of more than 3 mmol/L require immediate medical review.   PROCALCITONIN - Abnormal; Notable for the following components:    Procalcitonin 3.40 (*)     All other components within normal limits    Narrative:     As a Marker for Sepsis (Non-Neonates):    1. <0.5 ng/mL represents a low risk of severe sepsis and/or septic shock.  2. >2 ng/mL represents a high risk of severe sepsis and/or septic shock.    As a Marker for Lower Respiratory Tract Infections that require antibiotic therapy:    PCT on Admission    Antibiotic Therapy       6-12 Hrs later    >0.5                Strongly Recommended  >0.25 - <0.5        Recommended   0.1 - 0.25          Discouraged              Remeasure/reassess PCT  <0.1                Strongly Discouraged     Remeasure/reassess PCT    As 28 day mortality risk marker: \"Change in Procalcitonin Result\" (>80% or <=80%) if Day 0 (or Day 1) and Day 4 values are available. Refer to http://www.[employer]-pct-calculator.com    Change in PCT <=80%  A decrease of PCT levels below or equal to 80% defines a positive change in PCT test result representing a higher risk for 28-day all-cause mortality of " patients diagnosed with severe sepsis for septic shock.    Change in PCT >80%  A decrease of PCT levels of more than 80% defines a negative change in PCT result representing a lower risk for 28-day all-cause mortality of patients diagnosed with severe sepsis or septic shock.      URINALYSIS, MICROSCOPIC ONLY - Abnormal; Notable for the following components:    WBC, UA 6-12 (*)     Bacteria, UA 4+ (*)     Squamous Epithelial Cells, UA 3-6 (*)     All other components within normal limits   COVID-19,[facility] CEPHEID/ELBERT, NP SWAB IN TRANSPORT MEDIA 8-12 HR TAT - Normal    Narrative:     Fact sheet for providers: https://www.fda.gov/media/006320/download    Fact sheet for patients: https://www.fda.gov/media/971242/download    Test performed by PCR.   LIPASE - Normal   LACTIC ACID, PLASMA - Normal   COVID PRE-OP / PRE-PROCEDURE SCREENING ORDER (NO ISOLATION)    Narrative:     The following orders were created for panel order COVID PRE-OP / PRE-PROCEDURE SCREENING ORDER (NO ISOLATION) - Swab, Nasopharynx.  Procedure                               Abnormality         Status                     ---------                               -----------         ------                     COVID-19,[facility]...[[id number]]  Normal              Final result                 Please view results for these tests on the individual orders.   BLOOD CULTURE   BLOOD CULTURE   RAINBOW DRAW    Narrative:     The following orders were created for panel order [place] Draw.  Procedure                               Abnormality         Status                     ---------                               -----------         ------                     Green Top (Gel)[[id number]]                                  Final result               Lavender Top[[id number]]                                     Final result               Gold Top - SST[[id number]]                                   Final result               Light Blue Top[[id number]]                                    Final result                 Please view results for these tests on the individual orders.   POCT GLUCOSE FINGERSTICK   CBC AND DIFFERENTIAL    Narrative:     The following orders were created for panel order CBC & Differential.  Procedure                               Abnormality         Status                     ---------                               -----------         ------                     CBC Auto Differential[[id number]]        Abnormal            Final result                 Please view results for these tests on the individual orders.   GREEN TOP   LAVENDER TOP   GOLD TOP - SST   LIGHT BLUE TOP   KETONE BODIES SERUM    Narrative:     The following orders were created for panel order Ketone Bodies, Serum (Not performed at [place]).  Procedure                               Abnormality         Status                     ---------                               -----------         ------                     Beta Hydroxybutyrate Celestino...[[id number]]  Abnormal            Final result                 Please view results for these tests on the individual orders.       EKG:   No orders to display       Meds given in ED:   Medications   sodium chloride 0.9 % flush 10 mL (has no administration in time range)   iopamidol (ISOVUE-300) 61 % injection 100 mL (85 mL Intravenous Given by Other 3/16/22 1829)   piperacillin-tazobactam (ZOSYN) 3.375 g in iso-osmotic dextrose 50 ml (premix) (0 g Intravenous Stopped 3/16/22 2019)   fentaNYL citrate (PF) (SUBLIMAZE) injection 25 mcg (25 mcg Intravenous Given 3/16/22 1943)   vancomycin 1500 mg/500 mL 0.9% NS IVPB ([facility]) (1,500 mg Intravenous New Bag 3/16/22 2042)   fentaNYL citrate (PF) (SUBLIMAZE) injection 50 mcg (50 mcg Intravenous Given 3/16/22 2121)       Imaging results:  CT Chest With Contrast Diagnostic    Result Date: 3/16/2022  1. Moderate left lower lobe pneumonia. 2. Hepatic and renal cysts. 3. Small bowel segments are at the upper limits of  normal for diameter. This may be related to mild ileus. It is unlikely to represent a low-grade partial small bowel obstruction but if clinically indicated, follow-up plain radiographs may be helpful.  Radiation dose reduction techniques were utilized, including automated exposure control and exposure modulation based on body size.       CT Abdomen Pelvis With Contrast    Result Date: 3/16/2022  1. Moderate left lower lobe pneumonia. 2. Hepatic and renal cysts. 3. Small bowel segments are at the upper limits of normal for diameter. This may be related to mild ileus. It is unlikely to represent a low-grade partial small bowel obstruction but if clinically indicated, follow-up plain radiographs may be helpful.  Radiation dose reduction techniques were utilized, including automated exposure control and exposure modulation based on body size.         Ambulatory status:   Assist x1    Social issues:   Social History     Socioeconomic History   • Marital status:    Tobacco Use   • Smoking status: Former Smoker   • Smokeless tobacco: Former User       NIH Stroke Scale:        Nursing report ED to floor:

## 2022-03-17 NOTE — PLAN OF CARE
Goal Outcome Evaluation:  Plan of Care Reviewed With: patient        Progress: no change  Outcome Evaluation: Pt lying in bed, denies discomfort, daughter came for visit, Dr. Law came and saw , new orders acknowledged. Pt positive for mrsa nares, contact isoltion placed, Dr. Law paged for update, awaiting reply. Slp eval pt, rec cont same diet . Meds taken crushed in applesauce or pudding.

## 2022-03-17 NOTE — PROGRESS NOTES
"[facility] Clinical Pharmacy Services: Vancomycin Pharmacokinetic Initial Consult Note    Gisela Gandara is a 89 y.o. female who is on day 1 of pharmacy to dose vancomycin.    Indication: sepsis  Consulting Provider: Thanh  Planned Duration of Therapy: 5 days  Loading Dose Ordered or Given: 1500 mg on 3/16 at 2042  MRSA PCR performed: no  Culture/Source: Blood cx (pending)  Target: -600 mg/L.hr   Other Antimicrobials: zosyn    Vitals/Labs  Ht: 157.5 cm (62\"); Wt: 69.3 kg (152 lb 12.5 oz)  Temp Readings from Last 1 Encounters:   03/17/22 97.6 °F (36.4 °C) (Oral)    Estimated Creatinine Clearance: 33.1 mL/min (A) (by C-G formula based on SCr of 1.03 mg/dL (H)).       Results from last 7 days   Lab Units 03/16/22  1536   CREATININE mg/dL 1.03*   WBC 10*3/mm3 17.54*     Assessment/Plan:    Vancomycin Dose:  Start 1000 mg IV every 24 hours with 1st dose of this regimen at 1800 today.   Predictive AUC level for the dose ordered is 516mg/L.hr, which is within the target of 400-600 mg/L.hr  Vanc Trough has been ordered for 3/18/22 at 1730     Pharmacy will follow patient's kidney function and will adjust doses and obtain levels as necessary. Thank you for involving pharmacy in this patient's care. Please contact pharmacy with any questions or concerns.                           Oleg Perez [facility]  Clinical Pharmacist   "

## 2022-03-17 NOTE — CASE MANAGEMENT/SOCIAL WORK
Continued Stay Note  [facility]     Patient Name: Gisela Gandara  MRN: [id number]  Today's Date: 3/17/2022    Admit Date: 3/16/2022     Discharge Plan     Row Name 03/17/22 0845       Plan    Plan Plan return to [facility].   NIC Srivastava RN    Patient/Family in Agreement with Plan yes    Plan Comments FACE SHEET VERIFIED/ IM LETTER SIGNED.  Called and spoke with pt's daughter  ( Pam Gandara [phone]).  Pt is from [facility] and plan would be for her to return.  Pt has been there approx 3 years.   Kofi  ( [phone]) called for level of care and bed hold status - voice mail left.  Plan return to [facility].   NIC Srivastava RN               Discharge Codes    No documentation.                     Anny Srivastava, RN

## 2022-03-17 NOTE — CASE MANAGEMENT/SOCIAL WORK
Discharge Planning Assessment  [facility]     Patient Name: Gisela Gandara  MRN: [id number]  Today's Date: 3/17/2022    Admit Date: 3/16/2022     Discharge Needs Assessment     Row Name 03/17/22 0842       Living Environment    People in Home facility resident    Current Living Arrangements extended care facility    Primary Care Provided by other (see comments)  Staff at [facility]    Provides Primary Care For no one, unable/limited ability to care for self    Family Caregiver if Needed child(trent), adult    Family Caregiver Names Daughters  (Pam Gandara  [phone]) and (Agnes Wong [phone])    Quality of Family Relationships helpful;involved;supportive    Able to Return to Prior Arrangements yes    Living Arrangement Comments Pt from [facility]       Resource/Environmental Concerns    Resource/Environmental Concerns none       Transition Planning    Patient/Family Anticipates Transition to long-term care facility    Patient/Family Anticipated Services at Transition skilled nursing    Transportation Anticipated health plan transportation       Discharge Needs Assessment    Equipment Currently Used at Home --  Pt from [facility]    Concerns to be Addressed no discharge needs identified;denies needs/concerns at this time    Equipment Needed After Discharge --  Pt from [facility]    Discharge Facility/Level of Care Needs nursing facility, intermediate               Discharge Plan     Row Name 03/17/22 0845       Plan    Plan Plan return to [facility].   NIC Srivastava RN    Patient/Family in Agreement with Plan yes    Plan Comments FACE SHEET VERIFIED/ IM LETTER SIGNED.  Called and spoke with pt's daughter  ( Pam Gandara [phone]).  Pt is from [facility] and plan would be for her to return.  Pt has been there approx 3 years.   Kofi  ( [phone]) called for level of care and bed hold status - voice mail left.  Plan return to [facility].   NIC Srivastava RN              Continued  Care and Services - Admitted Since 3/16/2022     Destination     Service Provider Request Status Selected Services Address Phone Fax Patient Preferred    [facility]  Pending - Request Sent N/A [id number], [facility] [id number] [phone] --                 Demographic Summary     Row Name 03/17/22 0841       General Information    Admission Type inpatient    Arrived From emergency department    Required Notices Provided Important Message from Medicare    Referral Source admission list    Reason for Consult discharge planning    Preferred Language English               Functional Status     Row Name 03/17/22 0842       Functional Status    Usual Activity Tolerance fair    Current Activity Tolerance fair       Functional Status, IADL    Medications completely dependent    Meal Preparation completely dependent    Housekeeping completely dependent    Laundry completely dependent    Shopping completely dependent       Mental Status    General Appearance WDL WDL               Psychosocial    No documentation.                Abuse/Neglect    No documentation.                Legal    No documentation.                Substance Abuse    No documentation.                Patient Forms    No documentation.                   Anny Srivastava RN

## 2022-03-17 NOTE — CONSULTS
CONSULT NOTE    Infectious Diseases - Micheal Blackwood MD  [facility]       Patient Identification:  Name: Gisela Gandara  Age: 89 y.o.  Sex: female  :  1932  MRN: [id number]             Date of Consultation: 3/17/2022      Primary Care Physician: Rigoberto Law MD                               Requesting Physician: Dr. Law  Reason for Consultation: Sepsis    Impression: Patient is a 89-year-old female with a resident of nursing home has severe peripheral vascular disease as well as diabetes including history of right AKA and recent amputation of the left second toe presented on 3/16/2022 to the emergency room with altered mental status manifesting as increased lethargy and was noted to have low blood sugar.  Patient has had previous amputation of the great toe.  Work-up in the emergency room revealed left lower lobe pneumonia along with urinary tract infection.  Patient has been started on IV vancomycin and Zosyn blood cultures have been drawn and aggressive management of hypoglycemia and underlying diabetes been initiated.  Patient is more awake and interactive and currently denies any specific complaints.  This presentation and above context is consistent with:  1-toxic metabolic encephalopathy secondary to  2-ongoing systemic infection with sepsis due to:   -Healthcare associated pneumonia   -And urinary tract infection  3-peripheral vascular disease status post right AKA and recent amputation of the left second toe and prior history of amputation of left great toe.  4-diabetes mellitus  5-acute kidney injury on chronic kidney disease  6-positive MRSA screen  7-other diagnosis per primary team.      Recommendations/Discussions:  At this anticoagulative care plan consisting of empiric vancomycin and Zosyn for above identified infectious syndromes resulting in the presentation to the emergency room.  Closely monitor renal function and microbiological data adjust antibiotic therapy and  de-escalate to minimize side effects of antibiotics but appropriately treating the identified infectious process.  Management of other medical issues per primary team.  Duration of antibiotic treatment is open ended at this time but could be for 7 to 10 days.  Thank you Dr. Banda for letting me be the part of your patient care please see above impression and recommendations      History of Present Illness:   Patient is a 89-year-old female with a resident of nursing home has severe peripheral vascular disease as well as diabetes including history of right AKA and recent amputation of the left second toe presented on 3/16/2022 to the emergency room with altered mental status manifesting as increased lethargy and was noted to have low blood sugar.  Patient has had previous amputation of the great toe.  Work-up in the emergency room revealed left lower lobe pneumonia along with urinary tract infection.  Patient has been started on IV vancomycin and Zosyn blood cultures have been drawn and aggressive management of hypoglycemia and underlying diabetes been initiated.  Patient is more awake and interactive and currently denies any specific complaints.       Past Medical History:  Past Medical History:   Diagnosis Date   • Arthritis    • Diabetes mellitus (HCC)    • Hyperlipidemia    • Hypertension      Past Surgical History:  History reviewed. No pertinent surgical history.   Home Meds:  Medications Prior to Admission   Medication Sig Dispense Refill Last Dose   • acetaminophen (TYLENOL) 500 MG tablet Take 500 mg by mouth Every 6 (Six) Hours As Needed for Mild Pain .      • aspirin 81 MG EC tablet Take 81 mg by mouth Daily.      • cetirizine (zyrTEC) 10 MG tablet Take 10 mg by mouth Daily.      • cloNIDine (CATAPRES) 0.1 MG tablet Take 0.1 mg by mouth 2 (Two) Times a Day.      • Diclofenac Sodium (VOLTAREN) 1 % gel gel Apply 4 g topically to the appropriate area as directed 2 (Two) Times a Day.      • dilTIAZem CD  (CARDIZEM CD) 240 MG 24 hr capsule Take 300 mg by mouth Daily.   Patient Taking Differently at Unknown time   • gabapentin (NEURONTIN) 100 MG capsule Take 100 mg by mouth Every Night.      • guaiFENesin (ROBITUSSIN) 100 MG/5ML solution oral solution Take 200 mg by mouth Every 4 (Four) Hours As Needed.      • melatonin 3 MG tablet Take 3 mg by mouth Every Night.      • metoprolol succinate XL (TOPROL-XL) 100 MG 24 hr tablet Take 100 mg by mouth Daily.      • pravastatin (PRAVACHOL) 40 MG tablet Take 40 mg by mouth Daily.      • valsartan-hydrochlorothiazide (DIOVAN-HCT) 320-12.5 MG per tablet Take 1 tablet by mouth Daily.      • vitamin B-12 (CYANOCOBALAMIN) 500 MCG tablet Take 500 mcg by mouth Daily.      • azithromycin (ZITHROMAX) 250 MG tablet Take 250 mg by mouth Daily.      • FLUoxetine (PROzac) 20 MG capsule Take 20 mg by mouth Daily.      • insulin detemir (LEVEMIR) 100 UNIT/ML injection Inject 15 Units under the skin into the appropriate area as directed Daily.  12    • insulin lispro (humaLOG) 100 UNIT/ML injection Inject 0-24 Units under the skin into the appropriate area as directed 4 (Four) Times a Day With Meals & at Bedtime.  12    • multivitamin with minerals (Centrum Silver Ultra Womens) tablet tablet Take 1 tablet by mouth Daily.      • omeprazole (priLOSEC) 20 MG capsule Take 20 mg by mouth 2 (Two) Times a Day.      • polyethylene glycol (MIRALAX) 17 g packet Take 17 g by mouth Daily As Needed.      • spironolactone (ALDACTONE) 25 MG tablet Take 25 mg by mouth Daily.        Current Meds:     Current Facility-Administered Medications:   •  acetaminophen (TYLENOL) tablet 500 mg, 500 mg, Oral, Q6H PRN, Rigoberto Law MD  •  aspirin EC tablet 81 mg, 81 mg, Oral, Daily, Rigoberto Law MD, 81 mg at 03/17/22 1504  •  cloNIDine (CATAPRES) tablet 0.3 mg, 0.3 mg, Oral, Q8H, Rigoberto Law MD  •  dilTIAZem CD (CARDIZEM CD) 24 hr capsule 300 mg, 300 mg, Oral, Q24H, Rigoberto Law MD, 300 mg at 03/17/22 0920  •   FLUoxetine (PROzac) capsule 20 mg, 20 mg, Oral, Daily, Rigoberto Law MD, 20 mg at 03/17/22 0920  •  gabapentin (NEURONTIN) capsule 100 mg, 100 mg, Oral, Nightly, Rigoberto Law MD, 100 mg at 03/17/22 0252  •  guaiFENesin (MUCINEX) 12 hr tablet 600 mg, 600 mg, Oral, Q12H, Rigoberto Law MD, 600 mg at 03/17/22 1733  •  guaifenesin (ROBITUSSIN) 100 MG/5ML liquid 200 mg, 200 mg, Oral, Q4H PRN, Rigoberto Law MD  •  hydrALAZINE (APRESOLINE) injection 10 mg, 10 mg, Intravenous, Q4H PRN, Rigoberto Law MD  •  HYDROcodone-acetaminophen (NORCO) 5-325 MG per tablet 1 tablet, 1 tablet, Oral, Q4H PRN, Rigoberto Law MD  •  insulin glargine (LANTUS, SEMGLEE) injection 10 Units, 10 Units, Subcutaneous, Nightly, Rigoberto Law MD  •  insulin lispro (ADMELOG) injection 0-7 Units, 0-7 Units, Subcutaneous, TID AC, Rigoberto Law MD, 5 Units at 03/17/22 1733  •  ipratropium-albuterol (DUO-NEB) nebulizer solution 3 mL, 3 mL, Nebulization, Q4H PRN, Rigoberto Law MD  •  melatonin tablet 3 mg, 3 mg, Oral, Nightly PRN, Rigoberto Law MD  •  metoprolol succinate XL (TOPROL-XL) 24 hr tablet 100 mg, 100 mg, Oral, Q24H, Rigoberto Law MD, 100 mg at 03/17/22 0919  •  pantoprazole (PROTONIX) EC tablet 40 mg, 40 mg, Oral, BID AC, Rigoberto Law MD, 40 mg at 03/17/22 1736  •  Pharmacy to dose vancomycin, , Does not apply, Continuous PRN, Rigoberto Law MD  •  piperacillin-tazobactam (ZOSYN) 3.375 g in iso-osmotic dextrose 50 ml (premix), 3.375 g, Intravenous, Q8H, Rigoberto Law MD, 3.375 g at 03/17/22 1058  •  pravastatin (PRAVACHOL) tablet 40 mg, 40 mg, Oral, Nightly, Rigoberto Law MD  •  [COMPLETED] Insert peripheral IV, , , Once **AND** sodium chloride 0.9 % flush 10 mL, 10 mL, Intravenous, PRN, Gabriella Bradford, APRN, 10 mL at 03/17/22 0920  •  sodium chloride 0.9 % infusion, 75 mL/hr, Intravenous, Continuous, Rigoberto Law MD, Last Rate: 75 mL/hr at 03/17/22 1445, 75 mL/hr at 03/17/22 1445  •  valsartan (DIOVAN) tablet 320 mg, 320 mg, Oral, Q24H,  "Rigoberto Law MD, 320 mg at 03/17/22 0920  •  vancomycin (VANCOCIN) in iso-osmotic dextrose IVPB 1 g (premix) 200 mL, 1,000 mg, Intravenous, Q24H, Rigoberto Law MD, 1,000 mg at 03/17/22 1736  Allergies:  No Known Allergies  Social History:   Social History     Tobacco Use   • Smoking status: Former Smoker   • Smokeless tobacco: Former User   Substance Use Topics   • Alcohol use: Not on file      Family History:  History reviewed. No pertinent family history.       Review of Systems  See history of present illness and past medical history.    As described in history of present illness.  Remainder of ROS is negative.      Vitals:   /58 (BP Location: Left arm, Patient Position: Sitting)   Pulse 66   Temp 98.1 °F (36.7 °C) (Oral)   Resp 18   Ht 157.5 cm (62.01\")   Wt 69.3 kg (152 lb 12.5 oz)   SpO2 98%   BMI 27.94 kg/m²   I/O:     Intake/Output Summary (Last 24 hours) at 3/17/2022 1834  Last data filed at 3/17/2022 1736  Gross per 24 hour   Intake 1610 ml   Output --   Net 1610 ml     Exam:  Patient is examined using the personal protective equipment as per guidelines from infection control for this particular patient as enacted.  Hand washing was performed before and after patient interaction.  General Appearance:   Comfortable nontoxic chronically ill elderly female who is pleasant and does not appear to be in any acute distress.   Head:    Normocephalic, without obvious abnormality, atraumatic   Eyes:    PERRL, conjunctivae/corneas clear, EOM's intact, both eyes   Ears:    Normal external ear canals, both ears   Nose:   Nares normal, septum midline, mucosa normal, no drainage    or sinus tenderness   Throat:   Lips, tongue, gums normal; oral mucosa pink and moist   Neck:   Supple, symmetrical, trachea midline, no adenopathy;     thyroid:  no enlargement/tenderness/nodules; no carotid    bruit or JVD   Back:     Symmetric, no curvature, ROM normal, no CVA tenderness   Lungs:     Clear to auscultation " bilaterally, respirations unlabored   Chest Wall:    No tenderness or deformity    Heart:   S1-S2 regular   Abdomen:    Soft nontender   Extremities:  Right AKA stump well-healed, left foot shows prior amputation of the great toe and surgical wound which is sutured over the recently amputated second toe.   Skin:   Skin color normal, Skin is warm and dry,  no rashes or palpable lesions   Neurologic:  Grossly nonfocal       Data Review:    I reviewed the patient's new clinical results.  Results from last 7 days   Lab Units 03/16/22  1536   WBC 10*3/mm3 17.54*   HEMOGLOBIN g/dL 13.6   PLATELETS 10*3/mm3 387     Results from last 7 days   Lab Units 03/17/22  1019 03/16/22  1536   SODIUM mmol/L 136 135*   POTASSIUM mmol/L 4.4 4.3   CHLORIDE mmol/L 101 100   CO2 mmol/L 23.0 23.0   BUN mg/dL 27* 22   CREATININE mg/dL 1.36* 1.03*   CALCIUM mg/dL 8.8 9.1   GLUCOSE mg/dL 203* 142*     Microbiology Results (last 10 days)     Procedure Component Value - Date/Time    MRSA Screen, PCR (Inpatient) - Swab, Nares [[id number]]  (Abnormal) Collected: 03/17/22 1532    Lab Status: Final result Specimen: Swab from Nares Updated: 03/17/22 1644     MRSA PCR MRSA Detected    COVID PRE-OP / PRE-PROCEDURE SCREENING ORDER (NO ISOLATION) - Swab, Nasopharynx [[id number]]  (Normal) Collected: 03/16/22 1950    Lab Status: Final result Specimen: Swab from Nasopharynx Updated: 03/16/22 2030    Narrative:      The following orders were created for panel order COVID PRE-OP / PRE-PROCEDURE SCREENING ORDER (NO ISOLATION) - Swab, Nasopharynx.  Procedure                               Abnormality         Status                     ---------                               -----------         ------                     COVID-19,[facility]-HOUSE...[[id number]]  Normal              Final result                 Please view results for these tests on the individual orders.    COVID-19,[facility]-HOU[facility] CEPHEID/ELBERT NP SWAB IN TRANSPORT MEDIA 8-12 HR TAT - Swab,  Nasopharynx [[id number]]  (Normal) Collected: 03/16/22 1950    Lab Status: Final result Specimen: Swab from Nasopharynx Updated: 03/16/22 2030     COVID19 Not Detected    Narrative:      Fact sheet for providers: https://www.fda.gov/media/437422/download    Fact sheet for patients: https://www.fda.gov/media/479151/download    Test performed by PCR.        Brief Urine Lab Results  (Last result in the past 365 days)      Color   Clarity   Blood   Leuk Est   Nitrite   Protein   CREAT   Urine HCG        03/16/22 2045 Yellow   Turbid   Negative   Small (1+)   Negative   100 mg/dL (2+)               CT Chest With Contrast Diagnostic    Result Date: 3/16/2022  1. Moderate left lower lobe pneumonia. 2. Hepatic and renal cysts. 3. Small bowel segments are at the upper limits of normal for diameter. This may be related to mild ileus. It is unlikely to represent a low-grade partial small bowel obstruction but if clinically indicated, follow-up plain radiographs may be helpful.  Radiation dose reduction techniques were utilized, including automated exposure control and exposure modulation based on body size.  This report was finalized on 3/16/2022 10:40 PM by Dr. Rush Millan M.D.      CT Abdomen Pelvis With Contrast    Result Date: 3/16/2022  1. Moderate left lower lobe pneumonia. 2. Hepatic and renal cysts. 3. Small bowel segments are at the upper limits of normal for diameter. This may be related to mild ileus. It is unlikely to represent a low-grade partial small bowel obstruction but if clinically indicated, follow-up plain radiographs may be helpful.  Radiation dose reduction techniques were utilized, including automated exposure control and exposure modulation based on body size.  This report was finalized on 3/16/2022 10:40 PM by Dr. Rush Millan M.D.          Assessment:  Active Hospital Problems    Diagnosis  POA   • Pneumonia of left lower lobe due to infectious organism [J18.9]  Yes      Resolved Hospital  Problems   No resolved problems to display.         Plan:  See above  Micheal Thompson MD   3/17/2022  18:34 EDT    Much of this encounter note is an electronic transcription/translation of spoken language to printed text. The electronic translation of spoken language may permit erroneous, or at times, nonsensical words or phrases to be inadvertently transcribed; Although I have reviewed the note for such errors, some may still exist

## 2022-03-17 NOTE — H&P
"History and physical    Primary care physician   Dr. Law    Chief complaint  Altered mental status    History of present illness  89-year-old -American male with history of peripheral artery disease right above-knee amputation diabetes mellitus hypertension hyperlipidemia chronic kidney disease stage III who is a nursing home resident and well-known to our service sent to the ER with altered mental status.  Patient also have difficulty eating but no nausea vomiting cough fever chest pain or increased shortness of breath.  Patient work-up in ER revealed acute UTI and left lower lobe pneumonia Healthcare associated admit for management.    PAST MEDICAL HISTORY  • Peripheral artery disease     • Diabetes mellitus      • Hyperlipidemia     • Hypertension        PAST SURGICAL HISTORY     Right AKA     FAMILY HISTORY  History reviewed. No pertinent family history.    SOCIAL HISTORY                Socioeconomic History   • Marital status:    Tobacco Use   • Smoking status: Former Smoker   • Smokeless tobacco: Former User         ALLERGIES  Patient has no known allergies.  Nursing home medications reviewed     REVIEW OF SYSTEMS  All systems reviewed and marked as negative except as listed in HPI      PHYSICAL EXAM   Blood pressure (!) 189/93, pulse 66, temperature 97.6 °F (36.4 °C), temperature source Oral, resp. rate 18, height 157.5 cm (62\"), weight 69.3 kg (152 lb 12.5 oz), SpO2 98 %.    GENERAL: alert well developed, well nourished in no distress, obese. Elderly appearing.   HENT: NCAT, neck supple, trachea midline  EYES: no scleral icterus, PERRL, normal conjunctivae  CV: regular rhythm, regular rate, no murmur  RESPIRATORY: unlabored effort, CTAB  ABDOMEN: soft, nontender, nondistended, bowel sounds present  MUSCULOSKELETAL: no gross deformity. Right AKA. Stump intact. Left toe amputation incision site appears intact, midway down incision there is slight dehiscence but no active drainage or bleeding " appreciated. Suture closure noted which appears intact. No erythema or warmth.   NEURO: alert,  sensory and motor function of extremities grossly intact, speech clear, mental status normal/baseline  SKIN: warm, dry, no rash  PSYCH:  Appropriate mood and affect     LAB RESULTS  Lab Results (last 24 hours)     Procedure Component Value Units Date/Time    Basic Metabolic Panel [[id number]]  (Abnormal) Collected: 03/17/22 1019    Specimen: Blood Updated: 03/17/22 1129     Glucose 203 mg/dL      BUN 27 mg/dL      Creatinine 1.36 mg/dL      Sodium 136 mmol/L      Potassium 4.4 mmol/L      Chloride 101 mmol/L      CO2 23.0 mmol/L      Calcium 8.8 mg/dL      BUN/Creatinine Ratio 19.9     Anion Gap 12.0 mmol/L      eGFR 37.1 mL/min/1.73      Comment: National Kidney Foundation and American Society of Nephrology (ASN) Task Force recommended calculation based on the Chronic Kidney Disease Epidemiology Collaboration (CKD-EPI) equation refit without adjustment for race.       Narrative:      GFR Normal >60  Chronic Kidney Disease <60  Kidney Failure <15      POC Glucose Once [[id number]]  (Abnormal) Collected: 03/17/22 1055    Specimen: Blood Updated: 03/17/22 1056     Glucose 223 mg/dL      Comment: Meter: [id number] : [id number] Otf [place]       Urinalysis, Microscopic Only - Urine, Clean Catch [[id number]]  (Abnormal) Collected: 03/16/22 2045    Specimen: Urine, Clean Catch Updated: 03/16/22 2113     RBC, UA None Seen /HPF      WBC, UA 6-12 /HPF      Bacteria, UA 4+ /HPF      Squamous Epithelial Cells, UA 3-6 /HPF      Hyaline Casts, UA None Seen /LPF      Uric Acid Crystals, UA Moderate/2+ /HPF      Methodology Manual Light Microscopy    Urinalysis With Microscopic If Indicated (No Culture) - Urine, Clean Catch [[id number]]  (Abnormal) Collected: 03/16/22 2045    Specimen: Urine, Clean Catch Updated: 03/16/22 2102     Color, UA Yellow     Appearance, UA Turbid     pH, UA <=5.0     Specific Gravity, UA 1.025      Glucose, UA Negative     Ketones, UA Negative     Bilirubin, UA Negative     Blood, UA Negative     Protein,  mg/dL (2+)     Leuk Esterase, UA Small (1+)     Nitrite, UA Negative     Urobilinogen, UA 0.2 E.U./dL    COVID PRE-OP / PRE-PROCEDURE SCREENING ORDER (NO ISOLATION) - Swab, Nasopharynx [[id number]]  (Normal) Collected: 03/16/22 1950    Specimen: Swab from Nasopharynx Updated: 03/16/22 2030    Narrative:      The following orders were created for panel order COVID PRE-OP / PRE-PROCEDURE SCREENING ORDER (NO ISOLATION) - Swab, Nasopharynx.  Procedure                               Abnormality         Status                     ---------                               -----------         ------                     COVID-19,[facility]...[[id number]]  Normal              Final result                 Please view results for these tests on the individual orders.    COVID-19,[facility] CEPHEID/ELBERT NP SWAB IN TRANSPORT MEDIA 8-12 HR TAT - Swab, Nasopharynx [[id number]]  (Normal) Collected: 03/16/22 1950    Specimen: Swab from Nasopharynx Updated: 03/16/22 2030     COVID19 Not Detected    Narrative:      Fact sheet for providers: https://www.fda.gov/media/860126/download    Fact sheet for patients: https://www.fda.gov/media/518161/download    Test performed by PCR.    Lactic Acid, Plasma [[id number]]  (Normal) Collected: 03/16/22 1846    Specimen: Blood Updated: 03/16/22 1915     Lactate 1.7 mmol/L     Blood Culture - Blood, Arm, Left [[id number]] Collected: 03/16/22 1846    Specimen: Blood from Arm, Left Updated: 03/16/22 1852    Blood Culture - Blood, Arm, Left [[id number]] Collected: 03/16/22 1846    Specimen: Blood from Arm, Left Updated: 03/16/22 1852    Procalcitonin [[id number]]  (Abnormal) Collected: 03/16/22 1536    Specimen: Blood Updated: 03/16/22 1752     Procalcitonin 3.40 ng/mL     Narrative:      As a Marker for Sepsis (Non-Neonates):    1. <0.5 ng/mL represents a low risk of severe sepsis and/or  "septic shock.  2. >2 ng/mL represents a high risk of severe sepsis and/or septic shock.    As a Marker for Lower Respiratory Tract Infections that require antibiotic therapy:    PCT on Admission    Antibiotic Therapy       6-12 Hrs later    >0.5                Strongly Recommended  >0.25 - <0.5        Recommended   0.1 - 0.25          Discouraged              Remeasure/reassess PCT  <0.1                Strongly Discouraged     Remeasure/reassess PCT    As 28 day mortality risk marker: \"Change in Procalcitonin Result\" (>80% or <=80%) if Day 0 (or Day 1) and Day 4 values are available. Refer to http://www.[facility] [place]cal[place]    Change in PCT <=80%  A decrease of PCT levels below or equal to 80% defines a positive change in PCT test result representing a higher risk for 28-day all-cause mortality of patients diagnosed with severe sepsis for septic shock.    Change in PCT >80%  A decrease of PCT levels of more than 80% defines a negative change in PCT result representing a lower risk for 28-day all-cause mortality of patients diagnosed with severe sepsis or septic shock.       [place] Draw [[id number]] Collected: 03/16/22 1536    Specimen: Blood Updated: 03/16/22 1648    Narrative:      The following orders were created for panel order [place] Draw.  Procedure                               Abnormality         Status                     ---------                               -----------         ------                     Green Top (Gel)[[id number]]                                  Final result               Lavender Top[[id number]]                                     Final result               Gold Top - SST[[id number]]                                   Final result               Light Blue Top[[id number]]                                   Final result                 Please view results for these tests on the individual orders.    Green Top (Gel) [[id number]] Collected: 03/16/22 1536    Specimen: Blood Updated: " 03/16/22 1648     Extra Tube Hold for add-ons.     Comment: Auto resulted.       Lavender Top [[id number]] Collected: 03/16/22 1536    Specimen: Blood Updated: 03/16/22 1648     Extra Tube hold for add-on     Comment: Auto resulted       Gold Top - SST [[id number]] Collected: 03/16/22 1536    Specimen: Blood Updated: 03/16/22 1648     Extra Tube Hold for add-ons.     Comment: Auto resulted.       Light Blue Top [[id number]] Collected: 03/16/22 1536    Specimen: Blood Updated: 03/16/22 1648     Extra Tube hold for add-on     Comment: Auto resulted       Ketone Bodies, Serum (Not performed at [place]) [[id number]]  (Abnormal) Collected: 03/16/22 1536    Specimen: Blood Updated: 03/16/22 1619    Narrative:      The following orders were created for panel order Ketone Bodies, Serum (Not performed at [place]).  Procedure                               Abnormality         Status                     ---------                               -----------         ------                     Beta Hydroxybutyrate Celestino...[[id number]]  Abnormal            Final result                 Please view results for these tests on the individual orders.    Beta Hydroxybutyrate Quantitative [[id number]]  (Abnormal) Collected: 03/16/22 1536    Specimen: Blood Updated: 03/16/22 1619     Beta-Hydroxybutyrate Quant 0.544 mmol/L     Narrative:      In the assessment of possible diabetic ketoacidosis, the test should be interpreted along with other clinical and laboratory findings.  A level greater than 1 mmol/L should require further evaluation and levels of more than 3 mmol/L require immediate medical review.    Comprehensive Metabolic Panel [[id number]]  (Abnormal) Collected: 03/16/22 1536    Specimen: Blood Updated: 03/16/22 1612     Glucose 142 mg/dL      BUN 22 mg/dL      Creatinine 1.03 mg/dL      Sodium 135 mmol/L      Potassium 4.3 mmol/L      Comment: Slight hemolysis detected by analyzer. Results may be affected.        Chloride 100 mmol/L       CO2 23.0 mmol/L      Calcium 9.1 mg/dL      Total Protein 7.4 g/dL      Albumin 2.90 g/dL      ALT (SGPT) 28 U/L      AST (SGOT) 39 U/L      Alkaline Phosphatase 223 U/L      Total Bilirubin 0.4 mg/dL      Globulin 4.5 gm/dL      A/G Ratio 0.6 g/dL      BUN/Creatinine Ratio 21.4     Anion Gap 12.0 mmol/L      eGFR 51.8 mL/min/1.73      Comment: National Kidney Foundation and American Society of Nephrology (ASN) Task Force recommended calculation based on the Chronic Kidney Disease Epidemiology Collaboration (CKD-EPI) equation refit without adjustment for race.       Narrative:      GFR Normal >60  Chronic Kidney Disease <60  Kidney Failure <15      Lipase [[id number]]  (Normal) Collected: 03/16/22 1536    Specimen: Blood Updated: 03/16/22 1612     Lipase 36 U/L     CBC & Differential [[id number]]  (Abnormal) Collected: 03/16/22 1536    Specimen: Blood Updated: 03/16/22 1558    Narrative:      The following orders were created for panel order CBC & Differential.  Procedure                               Abnormality         Status                     ---------                               -----------         ------                     CBC Auto Differential[[id number]]        Abnormal            Final result                 Please view results for these tests on the individual orders.    CBC Auto Differential [[id number]]  (Abnormal) Collected: 03/16/22 1536    Specimen: Blood Updated: 03/16/22 1558     WBC 17.54 10*3/mm3      RBC 4.74 10*6/mm3      Hemoglobin 13.6 g/dL      Hematocrit 42.1 %      MCV 88.8 fL      MCH 28.7 pg      MCHC 32.3 g/dL      RDW 13.9 %      RDW-SD 44.7 fl      MPV 9.7 fL      Platelets 387 10*3/mm3      Neutrophil % 74.3 %      Lymphocyte % 15.3 %      Monocyte % 9.5 %      Eosinophil % 0.1 %      Basophil % 0.2 %      Immature Grans % 0.6 %      Neutrophils, Absolute 13.04 10*3/mm3      Lymphocytes, Absolute 2.69 10*3/mm3      Monocytes, Absolute 1.67 10*3/mm3      Eosinophils, Absolute 0.01  10*3/mm3      Basophils, Absolute 0.03 10*3/mm3      Immature Grans, Absolute 0.10 10*3/mm3      nRBC 0.0 /100 WBC         Imaging Results (Last 24 Hours)     Procedure Component Value Units Date/Time    CT Abdomen Pelvis With Contrast [[id number]] Collected: 03/16/22 1926     Updated: 03/16/22 2243    Narrative:      CT OF THE CHEST, ABDOMEN AND PELVIS WITH CONTRAST 03/16/2022     HISTORY: Leukocytosis. Evaluate for possible infection.     TECHNIQUE: Axial images were obtained from the lung apices to the  symphysis pubis after intravenous contrast. No oral contrast was given.        FINDINGS: In the left lower lobe is moderate patchy infiltrate. Minimal  infiltrate, atelectasis or scar is seen in the right lower lobe. Some of  the areas of infiltrate in the left lower lobe have a slightly nodular  appearance. There is some aortic and coronary calcification.  Degenerative changes of the bilateral shoulders is seen most severe in  the right though the left shoulder is incompletely included in the  field-of-view.     No pathologically enlarged lymph nodes are seen.     At least 4 or 5 low-density liver lesions are seen compatible with  hepatic cysts. Granulomatous calcifications are seen in the liver and  spleen.     Gallbladder has been removed. Pancreas is atrophic. Adrenal glands are  not enlarged. At least 3 right renal cysts are seen including a  moderately large 5 cm right renal cyst.     There is some aortoiliac calcification. Uterus has been removed. Urinary  bladder is unremarkable.     No free air is seen. No bowel wall thickening is seen. A few of the  segments of small bowel are at the upper limits of normal for diameter.       Impression:      1. Moderate left lower lobe pneumonia.  2. Hepatic and renal cysts.  3. Small bowel segments are at the upper limits of normal for diameter.  This may be related to mild ileus. It is unlikely to represent a  low-grade partial small bowel obstruction but if clinically  indicated,  follow-up plain radiographs may be helpful.     Radiation dose reduction techniques were utilized, including automated  exposure control and exposure modulation based on body size.     This report was finalized on 3/16/2022 10:40 PM by Dr. Rush Millan M.D.       CT Chest With Contrast Diagnostic [[id number]] Collected: 03/16/22 1926     Updated: 03/16/22 2243    Narrative:      CT OF THE CHEST, ABDOMEN AND PELVIS WITH CONTRAST 03/16/2022     HISTORY: Leukocytosis. Evaluate for possible infection.     TECHNIQUE: Axial images were obtained from the lung apices to the  symphysis pubis after intravenous contrast. No oral contrast was given.        FINDINGS: In the left lower lobe is moderate patchy infiltrate. Minimal  infiltrate, atelectasis or scar is seen in the right lower lobe. Some of  the areas of infiltrate in the left lower lobe have a slightly nodular  appearance. There is some aortic and coronary calcification.  Degenerative changes of the bilateral shoulders is seen most severe in  the right though the left shoulder is incompletely included in the  field-of-view.     No pathologically enlarged lymph nodes are seen.     At least 4 or 5 low-density liver lesions are seen compatible with  hepatic cysts. Granulomatous calcifications are seen in the liver and  spleen.     Gallbladder has been removed. Pancreas is atrophic. Adrenal glands are  not enlarged. At least 3 right renal cysts are seen including a  moderately large 5 cm right renal cyst.     There is some aortoiliac calcification. Uterus has been removed. Urinary  bladder is unremarkable.     No free air is seen. No bowel wall thickening is seen. A few of the  segments of small bowel are at the upper limits of normal for diameter.       Impression:      1. Moderate left lower lobe pneumonia.  2. Hepatic and renal cysts.  3. Small bowel segments are at the upper limits of normal for diameter.  This may be related to mild ileus. It is  unlikely to represent a  low-grade partial small bowel obstruction but if clinically indicated,  follow-up plain radiographs may be helpful.     Radiation dose reduction techniques were utilized, including automated  exposure control and exposure modulation based on body size.     This report was finalized on 3/16/2022 10:40 PM by Dr. Rush Millan M.D.           ECG 12 Lead  Component     QT Interval   ms 345              HEART RATE= 99  bpm  RR Interval= 608  ms  KS Interval= 174  ms  P Horizontal Axis= -2  deg  P Front Axis= 29  deg  QRSD Interval= 119  ms  QT Interval= 345  ms  QRS Axis= -69  deg  T Wave Axis= 110  deg  - ABNORMAL ECG -  Sinus rhythm  Probable left atrial enlargement  Nonspecific IVCD with LAD  Left ventricular hypertrophy  Inferior infarct, acute  Anterior infarct, old  Poor quality tracing repeat               Current Facility-Administered Medications:   •  acetaminophen (TYLENOL) tablet 500 mg, 500 mg, Oral, Q6H PRN, Rigoberto Law MD  •  aspirin EC tablet 81 mg, 81 mg, Oral, Daily, Rigoberto Law MD  •  cloNIDine (CATAPRES) tablet 0.3 mg, 0.3 mg, Oral, Q8H, Rigoberto Law MD  •  dilTIAZem CD (CARDIZEM CD) 24 hr capsule 300 mg, 300 mg, Oral, Q24H, Rigoberto Law MD, 300 mg at 03/17/22 0920  •  FLUoxetine (PROzac) capsule 20 mg, 20 mg, Oral, Daily, Rigoberto Law MD, 20 mg at 03/17/22 0920  •  gabapentin (NEURONTIN) capsule 100 mg, 100 mg, Oral, Nightly, Rigoberto Law MD, 100 mg at 03/17/22 0252  •  guaifenesin (ROBITUSSIN) 100 MG/5ML liquid 200 mg, 200 mg, Oral, Q4H PRN, Rigoberto Law MD  •  HYDROcodone-acetaminophen (NORCO) 5-325 MG per tablet 1 tablet, 1 tablet, Oral, Q4H PRN, Rigoberto Law MD  •  insulin lispro (ADMELOG) injection 0-7 Units, 0-7 Units, Subcutaneous, TID AC, Rigoberto Law MD, 3 Units at 03/17/22 1244  •  melatonin tablet 3 mg, 3 mg, Oral, Nightly PRN, Rigoberto Law MD  •  metoprolol succinate XL (TOPROL-XL) 24 hr tablet 100 mg, 100 mg, Oral, Q24H, Rigoberto Law MD, 100 mg  at 03/17/22 0919  •  pantoprazole (PROTONIX) EC tablet 40 mg, 40 mg, Oral, BID AC, Vance Law MD  •  Pharmacy to dose vancomycin, , Does not apply, Continuous PRN, Vance Law MD  •  piperacillin-tazobactam (ZOSYN) 3.375 g in iso-osmotic dextrose 50 ml (premix), 3.375 g, Intravenous, Q8H, Vance Law MD, 3.375 g at 03/17/22 1058  •  pravastatin (PRAVACHOL) tablet 40 mg, 40 mg, Oral, Nightly, Vance Law MD  •  [COMPLETED] Insert peripheral IV, , , Once **AND** sodium chloride 0.9 % flush 10 mL, 10 mL, Intravenous, PRN, Gabriella Bradford, APRN, 10 mL at 03/17/22 0920  •  valsartan (DIOVAN) tablet 320 mg, 320 mg, Oral, Q24H, Vance Law MD, 320 mg at 03/17/22 0920  •  vancomycin (VANCOCIN) in iso-osmotic dextrose IVPB 1 g (premix) 200 mL, 1,000 mg, Intravenous, Q24H, Vance Law MD     ASSESSMENT  Left lower lobe pneumonia  Acute UTI  Diabetes mellitus  Hypertension  Hyperlipidemia peripheral artery disease s/p right AKA  Immobilization syndrome  Gastroesophageal reflux disease    PLAN  Admit  IV antibiotics after obtaining cultures  Adjust nursing home medications  Stress ulcer DVT prophylaxis  Supportive care  Swallow eval  DNR  Discussed with family nursing staff  Follow closely further recommendation current hospital course    VANCE LAW MD

## 2022-03-17 NOTE — PROGRESS NOTES
[facility] Clinical Pharmacy Services: Dosing Consult    Pt Name: Gisela Gandara   : 1932  Weight: 69.3 kg (152 lb 12.5 oz)  Antibiotic: Zosyn  Indication: Pneumonia    Relevant clinical data and objective history reviewed:    Past Medical History:   Diagnosis Date    Arthritis     Diabetes mellitus (HCC)     Hyperlipidemia     Hypertension      Creatinine   Date Value Ref Range Status   2022 1.03 (H) 0.57 - 1.00 mg/dL Final   2020 0.75 0.57 - 1.00 mg/dL Final   2020 0.68 0.57 - 1.00 mg/dL Final   2019 0.7 0.7 - 1.5 mg/dL Final   2019 0.7 0.7 - 1.5 mg/dL Final   2019 1.2 0.7 - 1.5 mg/dL Final     BUN   Date Value Ref Range Status   2022 22 8 - 23 mg/dL Final   2019 13 7 - 20 mg/dL Final     Estimated Creatinine Clearance: 33.1 mL/min (A) (by C-G formula based on SCr of 1.03 mg/dL (H)).    Lab Results   Component Value Date    WBC 17.54 (H) 2022     Temp Readings from Last 3 Encounters:   22 99.5 °F (37.5 °C) (Oral)   20 97.6 °F (36.4 °C) (Oral)      Assessment/Plan    Ordered Zosyn 3.375 g Iv every 8 hours for a total of 10 days. Will monitor and adjust if culture data or pertinent lab values indicate this is best for the patient.     Thank you for this consult and please contact pharmacy with any questions or concerns.     Vel Sierra III, [facility]  Clinical Pharmacist

## 2022-03-17 NOTE — THERAPY EVALUATION
"Acute Care - Speech Language Pathology   Swallow Initial Evaluation [facility]     Patient Name: Gisela Gandara  : 1932  MRN: [id number]  Today's Date: 3/17/2022               Admit Date: 3/16/2022    Visit Dx:     ICD-10-CM ICD-9-CM   1. Pneumonia of left lower lobe due to infectious organism  J18.9 486   2. Leukocytosis, unspecified type  D72.829 288.60   3. Nausea and vomiting in adult  R11.2 787.01   4. Elevated procalcitonin  R79.89 790.99   5. Ileus (HCC)  K56.7 560.1     Patient Active Problem List   Diagnosis   • Diabetic ketoacidosis without coma associated with type 2 diabetes mellitus (HCC)   • Pneumonia of left lower lobe due to infectious organism     Past Medical History:   Diagnosis Date   • Arthritis    • Diabetes mellitus (HCC)    • Hyperlipidemia    • Hypertension      History reviewed. No pertinent surgical history.    SLP Recommendation and Plan                                                                      Plan of Care Reviewed With: patient, daughter  Outcome Evaluation: Clinical swallow evaluation completed.  Pt initially c/o food sticking in chest.  When questioned regarding specific foods stated everything 'went down' (dtr stated \"she will tell you everything is ok').  Pt trialed with thin by cup/straw,puree, soft/hard solids, and mixed consistency.  No s/s aspiration with any consistency.  Mild to moderate residue unable to be cleared independently with dry solid.  Adequate mastication of soft solids.  Reported tongue 'burning' with diet Pepsi and poor taste of water.  Denied sticking in chest, but belching noted after liquid wash.  Oral dysphagia characterized by slow transit and oral residue with dry consistencies.  Pt reporting particular preferences such as \"mashed potatoes, but only if hot\"dislike of puree or ground meats.  From oropharyngeal standpoint, pt appears safe for soft moist, ground meat diet with thin liquids; however, rec r/o esophageal impairment prior " to solid diet. Given LLL pna, reports of food sticking midsternum recommend consideration of Gi consult and/or esophagram to r/o abnormalites to esophageal structure and function.          EDUCATION  The patient has been educated in the following areas:   Dysphagia (Swallowing Impairment) Modified Diet Instruction.         SLP Outcome Measures (last 72 hours)     SLP Outcome Measures     Row Name 03/17/22 1500             SLP Outcome Measures    Outcome Measure Used? Adult NOMS  -SA              Adult FCM Scores    FCM Chosen Swallowing  -      Swallowing FCM Score 4  -            User Key  (r) = Recorded By, (t) = Taken By, (c) = Cosigned By    Initials Name Effective Dates    Magnolia Mock MS CCC-SLP 06/16/21 -                  Time Calculation:    Time Calculation- SLP     Row Name 03/17/22 1513             Time Calculation- SLP    SLP Start Time 1300  -            User Key  (r) = Recorded By, (t) = Taken By, (c) = Cosigned By    Initials Name Provider Type    Magnolia Mock MS CCC-SLP Speech and Language Pathologist                Therapy Charges for Today     Code Description Service Date Service Provider Modifiers Qty    [id number] HC ST EVAL ORAL PHARYNG SWALLOW 4 3/17/2022 Magnolia Knight MS CCC-SLP GN 1               Magnolia Knight MS CCC-CARLOS  3/17/2022

## 2022-03-18 NOTE — NURSING NOTE
Witnessed barcode unreadable on narcotic pain medication Norco 5 given by DEWAYNE Lundberg to this pt.

## 2022-03-18 NOTE — PROGRESS NOTES
"[facility] Clinical Pharmacy Services: Vancomycin Monitoring Note    Gisela Gandara is a 89 y.o. female who is on day 2/5 of pharmacy to dose vancomycin for sepsis.  Per Dr. Thompson: Duration of antibiotic treatment is open ended at this time but could be for 7 to 10 days.  Previous Vancomycin Dose:   1000 mg IV every 24 hours  Updated Cultures and Sensitivities: MRSA screen (+)      Vitals/Labs  Ht: 157.5 cm (62.01\"); Wt: 69.3 kg (152 lb 12.5 oz)   Temp Readings from Last 1 Encounters:   03/18/22 97.5 °F (36.4 °C) (Oral)     Estimated Creatinine Clearance: 18.6 mL/min (A) (by C-G formula based on SCr of 1.83 mg/dL (H)).     Results from last 7 days   Lab Units 03/18/22  0541 03/17/22  1019 03/16/22  1536   CREATININE mg/dL 1.83* 1.36* 1.03*   WBC 10*3/mm3 12.84*  --  17.54*     Assessment/Plan    Current Vancomycin Dose: 1000mg IV every 24 hours is showing AUC>800 now as scr has increased form 1.36 mg/dl yesterday to 1.83mg/dl today. Will d/c the scheduled 1gm regimen and intermittently dose the vancomycin.  Next Level Date and Time: Vanc Random on 3/18/22 at 1730 (this was originally a trough draw prior to the 1800 dose of previous scheduled regimen) I feel this level might be high so 2nd shift clinical [facility] will evaluate and pulse dose tonight if appropriate.   We will continue to monitor patient changes and renal function     Thank you for involving pharmacy in this patient's care. Please contact pharmacy with any questions or concerns.       Oleg Perez, [facility]  Clinical Pharmacist         "

## 2022-03-18 NOTE — PROGRESS NOTES
"  Infectious Diseases Progress Note    Micheal Thompson MD     [facility]  Los: 2 days  Patient Identification:  Name: Gisela Gandara  Age: 89 y.o.  Sex: female  :  1932  MRN: [id number]         Primary Care Physician: Rigoberto Law MD            Subjective: Comfortable denies any new complaints.  More appropriate.  Interval History: See consultation note.    Objective:    Scheduled Meds:aspirin, 81 mg, Oral, Daily  cloNIDine, 0.3 mg, Oral, Q8H  dilTIAZem CD, 300 mg, Oral, Q24H  FLUoxetine, 20 mg, Oral, Daily  gabapentin, 100 mg, Oral, Nightly  guaiFENesin, 600 mg, Oral, Q12H  insulin glargine, 10 Units, Subcutaneous, Nightly  insulin lispro, 0-7 Units, Subcutaneous, TID AC  metoprolol succinate XL, 100 mg, Oral, Q24H  pantoprazole, 40 mg, Oral, BID AC  piperacillin-tazobactam, 3.375 g, Intravenous, Q8H  pravastatin, 40 mg, Oral, Nightly  valsartan, 320 mg, Oral, Q24H  vancomycin, 1,000 mg, Intravenous, Q24H      Continuous Infusions:Pharmacy to dose vancomycin,   sodium chloride, 75 mL/hr, Last Rate: 75 mL/hr (22 1445)        Vital signs in last 24 hours:  Temp:  [97.5 °F (36.4 °C)-98.3 °F (36.8 °C)] 97.5 °F (36.4 °C)  Heart Rate:  [55-56] 55  Resp:  [18] 18  BP: (128-151)/(54-67) 128/54    Intake/Output:    Intake/Output Summary (Last 24 hours) at 3/18/2022 0948  Last data filed at 3/18/2022 0229  Gross per 24 hour   Intake 1250 ml   Output 700 ml   Net 550 ml       Exam:  /54 (BP Location: Left arm, Patient Position: Lying)   Pulse 55   Temp 97.5 °F (36.4 °C) (Oral)   Resp 18   Ht 157.5 cm (62.01\")   Wt 69.3 kg (152 lb 12.5 oz)   SpO2 97%   BMI 27.94 kg/m²   Patient is examined using the personal protective equipment as per guidelines from infection control for this particular patient as enacted.  Hand washing was performed before and after patient interaction.  General Appearance:  Resting comfortably                          Head:    Normocephalic, without obvious " abnormality, atraumatic                           Eyes:    PERRL, conjunctivae/corneas clear, EOM's intact, both eyes                         Throat:   Lips, tongue, gums normal; oral mucosa pink and moist                           Neck:   Supple, symmetrical, trachea midline, no JVD                         Lungs:    Clear to auscultation bilaterally, respirations unlabored                 Chest Wall:    No tenderness or deformity                          Heart:  S1-S2 regular                  Abdomen:   Soft nontender                 Extremities: Right AKA and left foot great toe and second toe amputated                        Pulses:   Pulses palpable in all extremities                            Skin:   Skin is warm and dry,  no rashes or palpable lesions                  Neurologic: Grossly nonfocal       Data Review:    I reviewed the patient's new clinical results.  Results from last 7 days   Lab Units 03/18/22  0541 03/16/22  1536   WBC 10*3/mm3 12.84* 17.54*   HEMOGLOBIN g/dL 10.3* 13.6   PLATELETS 10*3/mm3 307 387     Results from last 7 days   Lab Units 03/18/22  0541 03/17/22  1019 03/16/22  1536   SODIUM mmol/L 135* 136 135*   POTASSIUM mmol/L 3.9 4.4 4.3   CHLORIDE mmol/L 101 101 100   CO2 mmol/L 20.7* 23.0 23.0   BUN mg/dL 34* 27* 22   CREATININE mg/dL 1.83* 1.36* 1.03*   CALCIUM mg/dL 8.3 8.8 9.1   GLUCOSE mg/dL 148* 203* 142*         Assessment:    Pneumonia of left lower lobe due to infectious organism  1-toxic metabolic encephalopathy secondary to  2-ongoing systemic infection with sepsis due to:              -Healthcare associated pneumonia              -And urinary tract infection  3-peripheral vascular disease status post right AKA and recent amputation of the left second toe and prior history of amputation of left great toe.  4-diabetes mellitus  5-acute kidney injury on chronic kidney disease  6-positive MRSA screen  7-other diagnosis per primary  team.        Recommendations/Discussions:  Continue present treatment  See my recommendations and discussion on 3/18/2022.  Discussed with Dr. Banda.  Micheal Thompson MD  3/18/2022  09:48 EDT    Much of this encounter note is an electronic transcription/translation of spoken language to printed text. The electronic translation of spoken language may permit erroneous, or at times, nonsensical words or phrases to be inadvertently transcribed; Although I have reviewed the note for such errors, some may still exist

## 2022-03-18 NOTE — THERAPY EVALUATION
Patient Name: Gisela Gandara  : 1932    MRN: [id number]                              Today's Date: 3/18/2022       Admit Date: 3/16/2022    Visit Dx:     ICD-10-CM ICD-9-CM   1. Pneumonia of left lower lobe due to infectious organism  J18.9 486   2. Leukocytosis, unspecified type  D72.829 288.60   3. Nausea and vomiting in adult  R11.2 787.01   4. Elevated procalcitonin  R79.89 790.99   5. Ileus (HCC)  K56.7 560.1     Patient Active Problem List   Diagnosis   • Diabetic ketoacidosis without coma associated with type 2 diabetes mellitus (HCC)   • Pneumonia of left lower lobe due to infectious organism     Past Medical History:   Diagnosis Date   • Arthritis    • Diabetes mellitus (HCC)    • Hyperlipidemia    • Hypertension      History reviewed. No pertinent surgical history.   General Information     Row Name 22          OT Time and Intention    Document Type evaluation  -BL     Mode of Treatment individual therapy;occupational therapy  -     Row Name 22          General Information    Patient Profile Reviewed yes  -BL     Existing Precautions/Restrictions --  R AKA, multiple metatarsals amputated on LLE, nonambulatory, uses sling at facility for transfers  -     Barriers to Rehab medically complex;previous functional deficit  -     Row Name 22          Living Environment    [facility] in Home facility resident  Pt from [facility] with plans to return at D/C.  -     Row Name 22          Cognition    Orientation Status (Cognition) oriented x 3  -BL     Row Name 22          Safety Issues, Functional Mobility    Safety Issues Affecting Function (Mobility) insight into deficits/self-awareness;awareness of need for assistance;sequencing abilities  -BL     Impairments Affecting Function (Mobility) balance;coordination;endurance/activity tolerance;strength;range of motion (ROM);postural/trunk control  -BL           User Key  (r) = Recorded By, (t) =  Taken By, (c) = Cosigned By    Initials Name Provider Type     Anant Preciado OT Occupational Therapist                 Mobility/ADL's     Row Name 03/18/22 0903          Bed Mobility    Bed Mobility scooting/bridging;rolling right;rolling left  -     Rolling Left [place] (Bed Mobility) maximum assist (25% patient effort);verbal cues;nonverbal cues (demo/gesture)  -     Rolling Right [place] (Bed Mobility) maximum assist (25% patient effort);verbal cues;nonverbal cues (demo/gesture)  -     Scooting/Bridging [place] (Bed Mobility) dependent (less than 25% patient effort);verbal cues;nonverbal cues (demo/gesture);2 person assist  -     Assistive Device (Bed Mobility) bed rails;head of bed elevated  -     Comment, (Bed Mobility) Pt with difficulty reaching for bed rails to asssist with rolling in bed due to impaired ROM in BUEs  -     Row Name 03/18/22 0903          Transfers    Comment, (Transfers) NT-pt not appropriate, pt is non-ambulatory at baseline and reports that facility uses lift for all transfers  -     Row Name 03/18/22 0903          Activities of Daily Living    BADL Assessment/Intervention grooming  -     Row Name 03/18/22 0903          Grooming Assessment/Training    [place] Level (Grooming) wash face, hands;supervision;verbal cues  -     Position (Grooming) supine  -           User Key  (r) = Recorded By, (t) = Taken By, (c) = Cosigned By    Initials Name Provider Type     Anant Preciado OT Occupational Therapist               Obj/Interventions     Row Name 03/18/22 0905          Sensory Assessment (Somatosensory)    Sensory Assessment (Somatosensory) UE sensation intact  -     Row Name 03/18/22 0905          Vision Assessment/Intervention    Visual Impairment/Limitations WFL;corrective lenses for reading  -     Row Name 03/18/22 0905          Range of Motion Comprehensive    Comment, General Range of Motion BUE ~50% AROM shoulders, elbow/wrist/hand  "WFL  -     Row Name 03/18/22 0905          Strength Comprehensive (MMT)    Comment, General Manual Muscle Testing (MMT) Assessment BUE 2-/5  -     Row Name 03/18/22 0905          Shoulder (Therapeutic Exercise)    Shoulder (Therapeutic Exercise) AROM (active range of motion)  -     Shoulder AROM (Therapeutic Exercise) bilateral;flexion;extension;aBduction;aDduction;10 repetitions;supine  -     Row Name 03/18/22 0905          Elbow/Forearm (Therapeutic Exercise)    Elbow/Forearm (Therapeutic Exercise) AROM (active range of motion)  -     Elbow/Forearm AROM (Therapeutic Exercise) bilateral;extension;flexion;10 repetitions;supine  -     Row Name 03/18/22 0905          Hand (Therapeutic Exercise)    Hand (Therapeutic Exercise) AROM (active range of motion)  -     Hand AROM/AAROM (Therapeutic Exercise) bilateral;AROM (active range of motion);finger extension;finger aBduction;finger aDduction;finger flexion;10 repetitions  -     Row Name 03/18/22 0905          Motor Skills    Therapeutic Exercise elbow/forearm;hand;shoulder  -     Row Name 03/18/22 0905          Balance    Comment, Balance pt declining attempt to sit EOB stating \"I don't do that, the staff does it for me.\"  -           User Key  (r) = Recorded By, (t) = Taken By, (c) = Cosigned By    Initials Name Provider Type     Anant Preciado, OT Occupational Therapist               Goals/Plan     Row Name 03/18/22 0917          Bed Mobility Goal 1 (OT)    Activity/Assistive Device (Bed Mobility Goal 1, OT) bed mobility activities, all  -     [place] Level/Cues Needed (Bed Mobility Goal 1, OT) moderate assist (50-74% patient effort)  -     Time Frame (Bed Mobility Goal 1, OT) short term goal (STG);2 weeks  -     Progress/Outcomes (Bed Mobility Goal 1, OT) continuing progress toward goal  -     Row Name 03/18/22 0917          Bathing Goal 1 (OT)    Activity/Device (Bathing Goal 1, OT) upper body bathing  -     [place] " Level/Cues Needed (Bathing Goal 1, OT) minimum assist (75% or more patient effort)  -BL     Time Frame (Bathing Goal 1, OT) short term goal (STG);2 weeks  -BL     Progress/Outcomes (Bathing Goal 1, OT) continuing progress toward goal  -BL     Row Name 03/18/22 0917          Grooming Goal 1 (OT)    Activity/Device (Grooming Goal 1, OT) grooming skills, all  -BL     [place] (Grooming Goal 1, OT) standby assist  -BL     Time Frame (Grooming Goal 1, OT) short term goal (STG);2 weeks  -BL     Progress/Outcome (Grooming Goal 1, OT) continuing progress toward goal  -BL     Row Name 03/18/22 0917          Strength Goal 1 (OT)    Strength Goal 1 (OT) Pt will complete AROM to BUEs to increase functional use of UEs for ADL task  -BL     Time Frame (Strength Goal 1, OT) short term goal (STG);2 weeks  -BL     Progress/Outcome (Strength Goal 1, OT) continuing progress toward goal  -BL     Row Name 03/18/22 0917          Therapy Assessment/Plan (OT)    Planned Therapy Interventions (OT) BADL retraining;IADL retraining;occupation/activity based interventions;ROM/therapeutic exercise;patient/caregiver education/training  -BL           User Key  (r) = Recorded By, (t) = Taken By, (c) = Cosigned By    Initials Name Provider Type    Anant Montoya, OT Occupational Therapist               Clinical Impression     Row Name 03/18/22 0908          Pain Assessment    Pretreatment Pain Rating 0/10 - no pain  -BL     Posttreatment Pain Rating 0/10 - no pain  -BL     Row Name 03/18/22 0908          Plan of Care Review    Plan of Care Reviewed With patient  -BL     Progress no change  -BL     Outcome Evaluation Pt seen by OT this date for evaluation. Pt is a 89 y/o female admit from [facility] for AMS, and nausea/vomiting. PMHx includes peripheral artery disease right above-knee amputation, diabetes mellitus, hypertension, hyperlipidemia, chronic kidney disease stage III and multiple metatarsals amputated to LLE. Pt reports that she  "has been living at [facility] for ~2 years now and is dependent for all ADL care. Pt also reports that she is non-ambulatory at baseline and that the facility uses a lift for transfers. Upon arrival pt lying supine in bed, no c/o pain, A&O x3. Pt declining attempt to sit EOB and states \"I don't want to, at the facility the staff does it for me.\" Pt performed bed mobility rolling L<>R for brief management this date with Max A and verbal cues due to pt's impaired BUE ROM for reaching for railings to assist in bed mobility. Pt Dependent x2 for scooting in bed. Pt completed grooming task with Supervision in supine position and verbal cues. Pt completed BUE AROM x10 reps in supine to prevent contractures and assist with ADL task. Pt left in supine, needs in reach, alarm on. Pt to continue with skilled OT services 2x/wk to address goals and deficits. OT wore mask, gloves, glasses, hand hygiene performed, appropriate ppe worn during encounter.  -     Row Name 03/18/22 0908          Therapy Assessment/Plan (OT)    Rehab Potential (OT) fair, will monitor progress closely  -     Criteria for Skilled Therapeutic Interventions Met (OT) skilled treatment is necessary  -     Therapy Frequency (OT) 2 times/wk  -     Row Name 03/18/22 0908          Therapy Plan Review/Discharge Plan (OT)    Anticipated Discharge Disposition (OT) [facility] nursing facility  -     Row Name 03/18/22 0908          Vital Signs    Pre Patient Position Supine  -BL     Intra Patient Position Supine  -BL     Post Patient Position Supine  -BL     Row Name 03/18/22 0908          Positioning and Restraints    Pre-Treatment Position in bed  -BL     Post Treatment Position bed  -BL     In Bed supine;call light within reach;encouraged to call for assist;exit alarm on  -BL           User Key  (r) = Recorded By, (t) = Taken By, (c) = Cosigned By    Initials Name Provider Type    Anant Montoya, OT Occupational Therapist               Outcome Measures "     Row Name 03/18/22 0918          How much help from another is currently needed...    Putting on and taking off regular lower body clothing? 1  -BL     Bathing (including washing, rinsing, and drying) 1  -BL     Toileting (which includes using toilet bed pan or urinal) 1  -BL     Putting on and taking off regular upper body clothing 2  -BL     Taking care of personal grooming (such as brushing teeth) 3  -BL     Eating meals 3  -BL     AM-PAC 6 Clicks Score (OT) 11  -BL     Row Name 03/18/22 0918          Modified [place] Scale    Modified [place] Scale 5 - Severe disability.  Bedridden, incontinent, and requiring constant nursing care and attention.  -BL     Row Name 03/18/22 0918          Functional Assessment    Outcome Measure Options AM-PAC 6 Clicks Daily Activity (OT);Modified [place]  -BL           User Key  (r) = Recorded By, (t) = Taken By, (c) = Cosigned By    Initials Name Provider Type    Anant Montoya OT Occupational Therapist                Occupational Therapy Education                 Title: PT OT SLP Therapies (In Progress)     Topic: Occupational Therapy (In Progress)     Point: ADL training (Done)     Description:   Instruct learner(s) on proper safety adaptation and remediation techniques during self care or transfers.   Instruct in proper use of assistive devices.              Learning Progress Summary           Patient Acceptance, E, VU by  at 3/18/2022 0919    Comment: the role of OT                   Point: Home exercise program (Not Started)     Description:   Instruct learner(s) on appropriate technique for monitoring, assisting and/or progressing therapeutic exercises/activities.              Learner Progress:  Not documented in this visit.          Point: Precautions (Not Started)     Description:   Instruct learner(s) on prescribed precautions during self-care and functional transfers.              Learner Progress:  Not documented in this visit.          Point: Body mechanics  "(Not Started)     Description:   Instruct learner(s) on proper positioning and spine alignment during self-care, functional mobility activities and/or exercises.              Learner Progress:  Not documented in this visit.                      User Key     Initials Effective Dates Name Provider Type Discipline    BL 01/05/21 -  Anant Preciado OT Occupational Therapist OT              OT Recommendation and Plan  Planned Therapy Interventions (OT): BADL retraining, IADL retraining, occupation/activity based interventions, ROM/therapeutic exercise, patient/caregiver education/training  Therapy Frequency (OT): 2 times/wk  Plan of Care Review  Plan of Care Reviewed With: patient  Progress: no change  Outcome Evaluation: Pt seen by OT this date for evaluation. Pt is a 89 y/o female admit from [facility] for AMS, and nausea/vomiting. PMHx includes peripheral artery disease right above-knee amputation, diabetes mellitus, hypertension, hyperlipidemia, chronic kidney disease stage III and multiple metatarsals amputated to E. Pt reports that she has been living at [facility] for ~2 years now and is dependent for all ADL care. Pt also reports that she is non-ambulatory at baseline and that the facility uses a lift for transfers. Upon arrival pt lying supine in bed, no c/o pain, A&O x3. Pt declining attempt to sit EOB and states \"I don't want to, at the facility the staff does it for me.\" Pt performed bed mobility rolling L<>R for brief management this date with Max A and verbal cues due to pt's impaired BUE ROM for reaching for railings to assist in bed mobility. Pt Dependent x2 for scooting in bed. Pt completed grooming task with Supervision in supine position and verbal cues. Pt completed BUE AROM x10 reps in supine to prevent contractures and assist with ADL task. Pt left in supine, needs in reach, alarm on. Pt to continue with skilled OT services 2x/wk to address goals and deficits. OT wore mask, gloves, glasses, " hand hygiene performed, appropriate ppe worn during encounter.     Time Calculation:    Time Calculation- OT     Row Name 03/18/22 0919             Time Calculation- OT    OT Start Time 0820  -BL      OT Stop Time 0837  -BL      OT Time Calculation (min) 17 min  -BL      Total Timed Code Minutes- OT 12 minute(s)  -BL      OT Received On 03/18/22  -BL      OT - Next Appointment 03/21/22  -BL      OT Goal Re-Cert Due Date 04/01/22  -BL              Timed Charges    02418 - OT Therapeutic Exercise Minutes 12  -BL              Untimed Charges    OT Eval/Re-eval Minutes 5  -BL              Total Minutes    Timed Charges Total Minutes 12  -BL      Untimed Charges Total Minutes 5  -BL       Total Minutes 17  -BL            User Key  (r) = Recorded By, (t) = Taken By, (c) = Cosigned By    Initials Name Provider Type     Anant Preciado OT Occupational Therapist              Therapy Charges for Today     Code Description Service Date Service Provider Modifiers Qty    76016018260 HC OT THER PROC EA 15 MIN 3/18/2022 Anant Preciado OT GO 1    92550097230 HC OT EVAL MOD COMPLEXITY 2 3/18/2022 Anant Preciado OT GO 1               Anant Preciado OT  3/18/2022

## 2022-03-18 NOTE — PROGRESS NOTES
"Daily progress note    Chief complaint  Doing much better   No specific complaints  Denies fever cough shortness of breath    History of present illness  89-year-old -American male with history of peripheral artery disease right above-knee amputation diabetes mellitus hypertension hyperlipidemia chronic kidney disease stage III who is a nursing home resident and well-known to our service sent to the ER with altered mental status.  Patient also have difficulty eating but no nausea vomiting cough fever chest pain or increased shortness of breath.  Patient work-up in ER revealed acute UTI and left lower lobe pneumonia [facility] admit for management.     REVIEW OF SYSTEMS  All systems reviewed and marked as negative except as listed in HPI      PHYSICAL EXAM   Blood pressure 128/54, pulse 63, temperature 97.5 °F (36.4 °C), temperature source Oral, resp. rate 18, height 157.5 cm (62.01\"), weight 69.3 kg (152 lb 12.5 oz), SpO2 97 %.    GENERAL: alert well developed, well nourished in no distress, obese. Elderly appearing.   HENT: NCAT, neck supple, trachea midline  EYES: no scleral icterus, PERRL, normal conjunctivae  CV: regular rhythm, regular rate, no murmur  RESPIRATORY: unlabored effort, CTAB  ABDOMEN: soft, nontender, nondistended, bowel sounds present  MUSCULOSKELETAL: no gross deformity. Right AKA. Stump intact. Left toe amputation incision site appears intact, midway down incision there is slight dehiscence but no active drainage or bleeding appreciated. Suture closure noted which appears intact. No erythema or warmth.   NEURO: alert,  sensory and motor function of extremities grossly intact, speech clear, mental status normal/baseline  SKIN: warm, dry, no rash  PSYCH:  Appropriate mood and affect     LAB RESULTS  Lab Results (last 24 hours)     Procedure Component Value Units Date/Time    POC Glucose Once [[id number]]  (Abnormal) Collected: 03/18/22 1035    Specimen: Blood Updated: 03/18/22 " 1036     Glucose 242 mg/dL      Comment: Meter: [id number] : [id number] Oz Cole CNA       BNP [[id number]]  (Normal) Collected: 03/18/22 0541    Specimen: Blood Updated: 03/18/22 0759     proBNP 1,673.0 pg/mL     Narrative:      Among patients with dyspnea, NT-proBNP is highly sensitive for the detection of acute congestive heart failure. In addition NT-proBNP of <300 pg/ml effectively rules out acute congestive heart failure with 99% negative predictive value.    Results may be falsely decreased if patient taking Biotin.      TSH [[id number]]  (Normal) Collected: 03/18/22 0541    Specimen: Blood Updated: 03/18/22 0759     TSH 2.160 uIU/mL     Comprehensive Metabolic Panel [[id number]]  (Abnormal) Collected: 03/18/22 0541    Specimen: Blood Updated: 03/18/22 0756     Glucose 148 mg/dL      BUN 34 mg/dL      Creatinine 1.83 mg/dL      Sodium 135 mmol/L      Potassium 3.9 mmol/L      Comment: Slight hemolysis detected by analyzer. Results may be affected.        Chloride 101 mmol/L      CO2 20.7 mmol/L      Calcium 8.3 mg/dL      Total Protein 5.7 g/dL      Albumin 2.00 g/dL      ALT (SGPT) 16 U/L      AST (SGOT) 17 U/L      Alkaline Phosphatase 155 U/L      Total Bilirubin 0.3 mg/dL      Globulin 3.7 gm/dL      A/G Ratio 0.5 g/dL      BUN/Creatinine Ratio 18.6     Anion Gap 13.3 mmol/L      eGFR 26.0 mL/min/1.73      Comment: National Kidney Foundation and American Society of Nephrology (ASN) Task Force recommended calculation based on the Chronic Kidney Disease Epidemiology Collaboration (CKD-EPI) equation refit without adjustment for race.       Narrative:      GFR Normal >60  Chronic Kidney Disease <60  Kidney Failure <15      Lipid Panel [[id number]]  (Abnormal) Collected: 03/18/22 0541    Specimen: Blood Updated: 03/18/22 0756     Total Cholesterol 130 mg/dL      Triglycerides 110 mg/dL      HDL Cholesterol 39 mg/dL      LDL Cholesterol  71 mg/dL      VLDL Cholesterol 20 mg/dL      LDL/HDL Ratio 1.77     Narrative:      Cholesterol Reference Ranges  (U.S. Department of Health and Human Services ATP III Classifications)    Desirable          <200 mg/dL  Borderline High    200-239 mg/dL  High Risk          >240 mg/dL      Triglyceride Reference Ranges  (U.S. Department of Health and Human Services ATP III Classifications)    Normal           <150 mg/dL  Borderline High  150-199 mg/dL  High             200-499 mg/dL  Very High        >500 mg/dL    HDL Reference Ranges  (U.S. Department of Health and Human Services ATP III Classifications)    Low     <40 mg/dl (major risk factor for CHD)  High    >60 mg/dl ('negative' risk factor for CHD)        LDL Reference Ranges  (U.S. Department of Health and Human Services ATP III Classifications)    Optimal          <100 mg/dL  Near Optimal     100-129 mg/dL  Borderline High  130-159 mg/dL  High             160-189 mg/dL  Very High        >189 mg/dL    Hemoglobin A1c [[id number]]  (Abnormal) Collected: 03/18/22 0541    Specimen: Blood Updated: 03/18/22 0749     Hemoglobin A1C 6.90 %     Narrative:      Hemoglobin A1C Ranges:    Increased Risk for Diabetes  5.7% to 6.4%  Diabetes                     >= 6.5%  Diabetic Goal                < 7.0%    CBC & Differential [[id number]]  (Abnormal) Collected: 03/18/22 0541    Specimen: Blood Updated: 03/18/22 0716    Narrative:      The following orders were created for panel order CBC & Differential.  Procedure                               Abnormality         Status                     ---------                               -----------         ------                     CBC Auto Differential[[id number]]        Abnormal            Final result                 Please view results for these tests on the individual orders.    CBC Auto Differential [[id number]]  (Abnormal) Collected: 03/18/22 0541    Specimen: Blood Updated: 03/18/22 0716     WBC 12.84 10*3/mm3      RBC 3.58 10*6/mm3      Hemoglobin 10.3 g/dL      Hematocrit 32.4 %      MCV 90.5  fL      MCH 28.8 pg      MCHC 31.8 g/dL      RDW 13.7 %      RDW-SD 45.1 fl      MPV 10.2 fL      Platelets 307 10*3/mm3      Neutrophil % 62.9 %      Lymphocyte % 23.2 %      Monocyte % 11.8 %      Eosinophil % 1.2 %      Basophil % 0.3 %      Immature Grans % 0.6 %      Neutrophils, Absolute 8.08 10*3/mm3      Lymphocytes, Absolute 2.98 10*3/mm3      Monocytes, Absolute 1.51 10*3/mm3      Eosinophils, Absolute 0.15 10*3/mm3      Basophils, Absolute 0.04 10*3/mm3      Immature Grans, Absolute 0.08 10*3/mm3      nRBC 0.0 /100 WBC     POC Glucose Once [[id number]]  (Abnormal) Collected: 03/18/22 0614    Specimen: Blood Updated: 03/18/22 0617     Glucose 166 mg/dL      Comment: Meter: [id number] : [id number] Alicante Maryan Guillermina S NA       POC Glucose Once [[id number]]  (Abnormal) Collected: 03/17/22 2055    Specimen: Blood Updated: 03/17/22 2057     Glucose 210 mg/dL      Comment: Meter: [id number] : [id number] Alicante Maryan Guillermina S NA       Blood Culture - Blood, Arm, Left [[id number]]  (Normal) Collected: 03/16/22 1846    Specimen: Blood from Arm, Left Updated: 03/17/22 1902     Blood Culture No growth at 24 hours    Blood Culture - Blood, Arm, Left [[id number]]  (Normal) Collected: 03/16/22 1846    Specimen: Blood from Arm, Left Updated: 03/17/22 1902     Blood Culture No growth at 24 hours    MRSA Screen, PCR (Inpatient) - Swab, Nares [[id number]]  (Abnormal) Collected: 03/17/22 1532    Specimen: Swab from Nares Updated: 03/17/22 1644     MRSA PCR MRSA Detected    POC Glucose Once [[id number]]  (Abnormal) Collected: 03/17/22 1615    Specimen: Blood Updated: 03/17/22 1618     Glucose 302 mg/dL      Comment: Meter: [id number] : [id number] Otf CA           Imaging Results (Last 24 Hours)     ** No results found for the last 24 hours. **        ECG 12 Lead  Component     QT Interval   ms 345              HEART RATE= 99  bpm  RR Interval= 608  ms  RI Interval= 174  ms  P Horizontal Axis= -2   deg  P Front Axis= 29  deg  QRSD Interval= 119  ms  QT Interval= 345  ms  QRS Axis= -69  deg  T Wave Axis= 110  deg  - ABNORMAL ECG -  Sinus rhythm  Probable left atrial enlargement  Nonspecific IVCD with LAD  Left ventricular hypertrophy  Inferior infarct, acute  Anterior infarct, old  Poor quality tracing repeat               Current Facility-Administered Medications:   •  acetaminophen (TYLENOL) tablet 500 mg, 500 mg, Oral, Q6H PRN, Rigoberto Law MD  •  aspirin EC tablet 81 mg, 81 mg, Oral, Daily, Rigoberto Law MD, 81 mg at 03/18/22 1007  •  cloNIDine (CATAPRES) tablet 0.3 mg, 0.3 mg, Oral, Q8H, Rigoberto Law MD, 0.3 mg at 03/18/22 0558  •  dilTIAZem CD (CARDIZEM CD) 24 hr capsule 300 mg, 300 mg, Oral, Q24H, Rigoberto Law MD, 300 mg at 03/18/22 1008  •  FLUoxetine (PROzac) capsule 20 mg, 20 mg, Oral, Daily, Rigoberto Law MD, 20 mg at 03/18/22 1008  •  gabapentin (NEURONTIN) capsule 100 mg, 100 mg, Oral, Nightly, Rigoberto Law MD, 100 mg at 03/17/22 2209  •  guaiFENesin (MUCINEX) 12 hr tablet 600 mg, 600 mg, Oral, Q12H, Rigoberto Law MD, 600 mg at 03/18/22 1008  •  guaifenesin (ROBITUSSIN) 100 MG/5ML liquid 200 mg, 200 mg, Oral, Q4H PRN, Rigoberto Law MD  •  hydrALAZINE (APRESOLINE) injection 10 mg, 10 mg, Intravenous, Q4H PRN, Rigoberto Law MD  •  HYDROcodone-acetaminophen (NORCO) 5-325 MG per tablet 1 tablet, 1 tablet, Oral, Q4H PRN, Rigoberto Law MD, 1 tablet at 03/17/22 2214  •  insulin glargine (LANTUS, SEMGLEE) injection 10 Units, 10 Units, Subcutaneous, Nightly, Rigoberto Law MD, 10 Units at 03/17/22 2220  •  insulin lispro (ADMELOG) injection 0-7 Units, 0-7 Units, Subcutaneous, TID AC, Rigoberto Law MD, 5 Units at 03/17/22 1733  •  ipratropium-albuterol (DUO-NEB) nebulizer solution 3 mL, 3 mL, Nebulization, Q4H PRN, Rigoberto Law MD  •  melatonin tablet 3 mg, 3 mg, Oral, Nightly PRN, Rigoberto Law MD  •  metoprolol succinate XL (TOPROL-XL) 24 hr tablet 100 mg, 100 mg, Oral, Q24H, Rigoberto Law MD, 100 mg  at 03/18/22 1008  •  pantoprazole (PROTONIX) EC tablet 40 mg, 40 mg, Oral, BID AC, Vance Law MD, 40 mg at 03/18/22 0600  •  Pharmacy to dose vancomycin, , Does not apply, Continuous PRN, Vance Law MD  •  piperacillin-tazobactam (ZOSYN) 3.375 g in iso-osmotic dextrose 50 ml (premix), 3.375 g, Intravenous, Q8H, Vance Law MD, 3.375 g at 03/18/22 1008  •  pravastatin (PRAVACHOL) tablet 40 mg, 40 mg, Oral, Nightly, Vance Law MD, 40 mg at 03/17/22 2208  •  [COMPLETED] Insert peripheral IV, , , Once **AND** sodium chloride 0.9 % flush 10 mL, 10 mL, Intravenous, PRN, Gabriella Bradford, APRN, 10 mL at 03/17/22 0920  •  sodium chloride 0.9 % infusion, 75 mL/hr, Intravenous, Continuous, Vance Law MD, Last Rate: 75 mL/hr at 03/17/22 1445, 75 mL/hr at 03/17/22 1445  •  valsartan (DIOVAN) tablet 320 mg, 320 mg, Oral, Q24H, Vance Law MD, 320 mg at 03/18/22 1008     ASSESSMENT  Left lower lobe pneumonia aspiration with MRSA screen positive  Acute UTI  Diabetes mellitus  Hypertension  Hyperlipidemia peripheral artery disease s/p right AKA  Immobilization syndrome  Gastroesophageal reflux disease    PLAN  CPM  Continue IV antibiotics  Adjust nursing home medications  Stress ulcer DVT prophylaxis  Infectious disease to follow patient   Supportive care  DNR  PT/OT  Discussed with family nursing staff  Follow closely further recommendation current hospital course    VANCE LAW MD

## 2022-03-18 NOTE — PLAN OF CARE
Goal Outcome Evaluation:  Plan of Care Reviewed With: patient      Pt rested quietly in bed. Alert and oriented. VSS and on RA.SB on the monitor.PRN meds given for back pain. IV abnx given and IVF infusing.Pt remains on full liquid diet and showing difficulty swallowing crushed meds with apple.Pt had decreased urine output this shift, bladder scan showed 623ml.Straight cath 600ml out.Will cont to monitor.

## 2022-03-18 NOTE — PLAN OF CARE
"Goal Outcome Evaluation:  Plan of Care Reviewed With: patient        Progress: no change  Outcome Evaluation: Pt seen by OT this date for evaluation. Pt is a 89 y/o female admit from [facility] for AMS, and nausea/vomiting. PMHx includes peripheral artery disease right above-knee amputation, diabetes mellitus, hypertension, hyperlipidemia, chronic kidney disease stage III and multiple metatarsals amputated to LLE. Pt reports that she has been living at [facility] for ~2 years now and is dependent for all ADL care. Pt also reports that she is non-ambulatory at baseline and that the facility uses a lift for transfers. Upon arrival pt lying supine in bed, no c/o pain, A&O x3. Pt declining attempt to sit EOB and states \"I don't want to, at the facility the staff does it for me.\" Pt performed bed mobility rolling L<>R for brief management this date with Max A and verbal cues due to pt's impaired BUE ROM for reaching for railings to assist in bed mobility. Pt Dependent x2 for scooting in bed. Pt completed grooming task with Supervision in supine position and verbal cues. Pt completed BUE AROM x10 reps in supine to prevent contractures and assist with ADL task. Pt left in supine, needs in reach, alarm on. Pt to continue with skilled OT services 2x/wk to address goals and deficits. OT wore mask, gloves, glasses, hand hygiene performed, appropriate ppe worn during encounter.  "

## 2022-03-18 NOTE — PLAN OF CARE
Goal Outcome Evaluation:  Plan of Care Reviewed With: patient           Outcome Evaluation: Patient alert and oriented. Turned q2, Denied pain nausea and SOA. Complained of constipation and new orders acknowledged. VSS. Nursing will cotninue to monitor.

## 2022-03-19 NOTE — PROGRESS NOTES
"Daily progress note    Chief complaint  Doing better   No specific complaints  Denies fever cough shortness of breath    History of present illness  89-year-old -American male with history of peripheral artery disease right above-knee amputation diabetes mellitus hypertension hyperlipidemia chronic kidney disease stage III who is a nursing home resident and well-known to our service sent to the ER with altered mental status.  Patient also have difficulty eating but no nausea vomiting cough fever chest pain or increased shortness of breath.  Patient work-up in ER revealed acute UTI and left lower lobe pneumonia [facility] associated admit for management.     REVIEW OF SYSTEMS  Unremarkable except generalized weakness     PHYSICAL EXAM   Blood pressure 135/50, pulse 52, temperature 98.5 °F (36.9 °C), temperature source Oral, resp. rate 18, height 157.5 cm (62.01\"), weight 69.3 kg (152 lb 12.5 oz), SpO2 98 %.    GENERAL: alert well developed, well nourished in no distress, obese.   HENT: NCAT, neck supple, trachea midline  EYES: no scleral icterus, PERRL, normal conjunctivae  CV: regular rhythm, regular rate, no murmur  RESPIRATORY: unlabored effort, CTAB  ABDOMEN: soft, nontender, nondistended, bowel sounds present  MUSCULOSKELETAL: no gross deformity. Right AKA. Stump intact. Left toe amputation incision site appears intact, midway down incision there is slight dehiscence but no active drainage or bleeding appreciated. Suture closure noted which appears intact. No erythema or warmth.   NEURO: alert,  sensory and motor function of extremities grossly intact, speech clear, mental status normal/baseline  SKIN: warm, dry, no rash  PSYCH:  Appropriate mood and affect     LAB RESULTS  Lab Results (last 24 hours)     Procedure Component Value Units Date/Time    POC Glucose Once [[id number]]  (Abnormal) Collected: 03/19/22 1115    Specimen: Blood Updated: 03/19/22 1117     Glucose 147 mg/dL      Comment: Meter: " [id number] : [id number] SelvinAnaStan [place]       Basic Metabolic Panel [[id number]]  (Abnormal) Collected: 03/19/22 0737    Specimen: Blood Updated: 03/19/22 0920     Glucose 58 mg/dL      BUN 37 mg/dL      Creatinine 1.83 mg/dL      Sodium 136 mmol/L      Potassium 3.1 mmol/L      Chloride 102 mmol/L      CO2 21.0 mmol/L      Calcium 7.9 mg/dL      BUN/Creatinine Ratio 20.2     Anion Gap 13.0 mmol/L      eGFR 26.0 mL/min/1.73      Comment: National Kidney Foundation and American Society of Nephrology (ASN) Task Force recommended calculation based on the Chronic Kidney Disease Epidemiology Collaboration (CKD-EPI) equation refit without adjustment for race.       Narrative:      GFR Normal >60  Chronic Kidney Disease <60  Kidney Failure <15      POC Glucose Once [[id number]]  (Normal) Collected: 03/19/22 0912    Specimen: Blood Updated: 03/19/22 0913     Glucose 99 mg/dL      Comment: Meter: [id number] : [id number] Kilo Campbell RN       Vancomycin, Random [[id number]]  (Normal) Collected: 03/19/22 0737    Specimen: Blood Updated: 03/19/22 0904     Vancomycin Random 19.60 mcg/mL     Narrative:      Therapeutic Ranges for Vancomycin    Vancomycin Random   5.0-40.0 mcg/mL  Vancomycin Trough   5.0-20.0 mcg/mL  Vancomycin Peak     20.0-40.0 mcg/mL    POC Glucose Once [[id number]]  (Normal) Collected: 03/19/22 0847    Specimen: Blood Updated: 03/19/22 0849     Glucose 79 mg/dL      Comment: Meter: [id number] : [id number] Kilo Campbell RN       CBC & Differential [[id number]]  (Abnormal) Collected: 03/19/22 0737    Specimen: Blood Updated: 03/19/22 0845    Narrative:      The following orders were created for panel order CBC & Differential.  Procedure                               Abnormality         Status                     ---------                               -----------         ------                     CBC Auto Differential[[id number]]        Abnormal            Final result                 Please  view results for these tests on the individual orders.    CBC Auto Differential [[id number]]  (Abnormal) Collected: 03/19/22 0737    Specimen: Blood Updated: 03/19/22 0845     WBC 12.97 10*3/mm3      RBC 3.52 10*6/mm3      Hemoglobin 10.2 g/dL      Hematocrit 31.6 %      MCV 89.8 fL      MCH 29.0 pg      MCHC 32.3 g/dL      RDW 13.8 %      RDW-SD 44.8 fl      MPV 10.1 fL      Platelets 332 10*3/mm3      Neutrophil % 72.6 %      Lymphocyte % 17.0 %      Monocyte % 9.0 %      Eosinophil % 0.8 %      Basophil % 0.2 %      Immature Grans % 0.4 %      Neutrophils, Absolute 9.43 10*3/mm3      Lymphocytes, Absolute 2.20 10*3/mm3      Monocytes, Absolute 1.17 10*3/mm3      Eosinophils, Absolute 0.10 10*3/mm3      Basophils, Absolute 0.02 10*3/mm3      Immature Grans, Absolute 0.05 10*3/mm3      nRBC 0.0 /100 WBC     POC Glucose Once [[id number]]  (Abnormal) Collected: 03/19/22 0654    Specimen: Blood Updated: 03/19/22 0655     Glucose 64 mg/dL      Comment: Meter: [id number] : [id number] Dl CA       POC Glucose Once [[id number]]  (Normal) Collected: 03/18/22 2037    Specimen: Blood Updated: 03/18/22 2039     Glucose 125 mg/dL      Comment: Meter: [id number] : [id number] Dl CA       Vancomycin, Random [[id number]]  (Normal) Collected: 03/18/22 1804    Specimen: Blood Updated: 03/18/22 1907     Vancomycin Random 24.30 mcg/mL     Narrative:      Therapeutic Ranges for Vancomycin    Vancomycin Random   5.0-40.0 mcg/mL  Vancomycin Trough   5.0-20.0 mcg/mL  Vancomycin Peak     20.0-40.0 mcg/mL    Blood Culture - Blood, Arm, Left [[id number]]  (Normal) Collected: 03/16/22 1846    Specimen: Blood from Arm, Left Updated: 03/18/22 1901     Blood Culture No growth at 2 days    Blood Culture - Blood, Arm, Left [[id number]]  (Normal) Collected: 03/16/22 1846    Specimen: Blood from Arm, Left Updated: 03/18/22 1901     Blood Culture No growth at 2 days    [facility] SCREEN - Swab, Axilla Right, Axilla  Left and Groin [[id number]] Collected: 03/18/22 1752    Specimen: Swab from Axilla Right, Axilla Left and Groin Updated: 03/18/22 1853    POC Glucose Once [[id number]]  (Abnormal) Collected: 03/18/22 1619    Specimen: Blood Updated: 03/18/22 1620     Glucose 202 mg/dL      Comment: Meter: [id number] : [id number] Oz Cole GURVINDERA           Imaging Results (Last 24 Hours)     ** No results found for the last 24 hours. **        ECG 12 Lead  Component     QT Interval   ms 345              HEART RATE= 99  bpm  RR Interval= 608  ms  OH Interval= 174  ms  P Horizontal Axis= -2  deg  P Front Axis= 29  deg  QRSD Interval= 119  ms  QT Interval= 345  ms  QRS Axis= -69  deg  T Wave Axis= 110  deg  - ABNORMAL ECG -  Sinus rhythm  Probable left atrial enlargement  Nonspecific IVCD with LAD  Left ventricular hypertrophy  Inferior infarct, acute  Anterior infarct, old  Poor quality tracing repeat               Current Facility-Administered Medications:   •  acetaminophen (TYLENOL) tablet 500 mg, 500 mg, Oral, Q6H PRN, Rigobetro Law MD  •  aspirin EC tablet 81 mg, 81 mg, Oral, Daily, Rigoberto Law MD, 81 mg at 03/19/22 0845  •  cloNIDine (CATAPRES) tablet 0.3 mg, 0.3 mg, Oral, Q8H, Rigoberto Law MD, 0.3 mg at 03/19/22 0622  •  dilTIAZem CD (CARDIZEM CD) 24 hr capsule 300 mg, 300 mg, Oral, Q24H, Rigoberto Law MD, 300 mg at 03/19/22 0845  •  docusate sodium (COLACE) capsule 100 mg, 100 mg, Oral, Daily PRN, Rigoberto Law MD, 100 mg at 03/18/22 1744  •  FLUoxetine (PROzac) capsule 20 mg, 20 mg, Oral, Daily, Rigoberto Law MD, 20 mg at 03/19/22 0845  •  gabapentin (NEURONTIN) capsule 100 mg, 100 mg, Oral, Nightly, Rigoberto Law MD, 100 mg at 03/18/22 2141  •  guaiFENesin (MUCINEX) 12 hr tablet 600 mg, 600 mg, Oral, Q12H, Rigoberto Law MD, 600 mg at 03/19/22 0845  •  guaifenesin (ROBITUSSIN) 100 MG/5ML liquid 200 mg, 200 mg, Oral, Q4H PRN, Rigoberto Law MD  •  hydrALAZINE (APRESOLINE) injection 10 mg, 10 mg, Intravenous, Q4H PRN,  Rigoberto Law MD  •  HYDROcodone-acetaminophen (NORCO) 5-325 MG per tablet 1 tablet, 1 tablet, Oral, Q4H PRN, Rigoberto Law MD, 1 tablet at 03/19/22 0625  •  insulin glargine (LANTUS, SEMGLEE) injection 15 Units, 15 Units, Subcutaneous, Nightly, Rigoberto Lwa MD, 15 Units at 03/18/22 2140  •  insulin lispro (ADMELOG) injection 0-7 Units, 0-7 Units, Subcutaneous, 4x Daily With Meals & Nightly, Rigoberto Law MD, 3 Units at 03/18/22 1744  •  insulin lispro (ADMELOG) injection 5 Units, 5 Units, Subcutaneous, TID With Meals, Rigoberto Law MD, 5 Units at 03/19/22 1217  •  ipratropium-albuterol (DUO-NEB) nebulizer solution 3 mL, 3 mL, Nebulization, Q4H PRN, Rigoberto Law MD  •  melatonin tablet 3 mg, 3 mg, Oral, Nightly PRN, Rigoberto Law MD  •  metoprolol succinate XL (TOPROL-XL) 24 hr tablet 100 mg, 100 mg, Oral, Q24H, Rigoberto Law MD, 100 mg at 03/19/22 0924  •  pantoprazole (PROTONIX) EC tablet 40 mg, 40 mg, Oral, BID AC, Rigoberto Law MD, 40 mg at 03/19/22 0621  •  Pharmacy to dose vancomycin, , Does not apply, Continuous PRN, Rigoberto Law MD  •  piperacillin-tazobactam (ZOSYN) 3.375 g in iso-osmotic dextrose 50 ml (premix), 3.375 g, Intravenous, Q12H, Rigoberto Law MD  •  pravastatin (PRAVACHOL) tablet 40 mg, 40 mg, Oral, Nightly, Rigoberto Law MD, 40 mg at 03/18/22 2141  •  [COMPLETED] Insert peripheral IV, , , Once **AND** sodium chloride 0.9 % flush 10 mL, 10 mL, Intravenous, PRN, Gabriella Bradford APRN, 10 mL at 03/17/22 0920  •  sodium chloride 0.9 % infusion, 50 mL/hr, Intravenous, Continuous, Rigoberto Law MD, Last Rate: 50 mL/hr at 03/19/22 0326, 50 mL/hr at 03/19/22 0326  •  valsartan (DIOVAN) tablet 320 mg, 320 mg, Oral, Q24H, Rigoberto Law MD, 320 mg at 03/19/22 0845  •  [START ON 3/20/2022] vancomycin 500 mg/100 mL 0.9% NS IVPB (mbp), 500 mg, Intravenous, Q24H, Rigoberto Law MD  •  Vancomycin Pharmacy Intermittent Dosing, , Does not apply, Daily, Rigoberto Law MD     ASSESSMENT  Left lower lobe  pneumonia aspiration with MRSA screen positive  Acute UTI  Diabetes mellitus  Hypertension  Hyperlipidemia peripheral artery disease s/p right AKA  Immobilization syndrome  Gastroesophageal reflux disease    PLAN  CPM  Continue IV antibiotics  Adjust nursing home medications  Stress ulcer DVT prophylaxis  Infectious disease to follow patient   Supportive care  DNR  PT/OT  Discussed with family nursing staff  Follow closely further recommendation current hospital course    VANCE VILLEDA MD

## 2022-03-19 NOTE — PROGRESS NOTES
"[facility] Clinical Pharmacy Services: Vancomycin Monitoring Note  Gisela Gandara is a 89 y.o. female who is on day 4 of pharmacy to dose vancomycin for Sepsis secondary to PNA and UTI  ID following - Dr. Thompson    Previous Vancomycin Dose: 1000 mg IV every 24 hours with last dose 3/17 and adjusted to pulse dosing 3/18  Other Antimicrobials: Zosyn 3.375gm IV q12h EI (renal adjustment for eCrCl < 20 mL/min at this time)    Updated Cultures and Sensitivities:   3/16: BCx-NG2d  3/17: MRSA nares: MRSA detected  3/18 Candida auris screen: pending    Results from last 7 days   Lab Units 03/19/22  0737 03/18/22  1804   VANCOMYCIN RM mcg/mL 19.60 24.30   3/19 random level is 38h from last dose    Vitals/Labs  Ht: 157.5 cm (62.01\"); Wt: 69.3 kg (152 lb 12.5 oz)     Temp Readings from Last 1 Encounters:   03/19/22 98.5 °F (36.9 °C) (Oral)     Estimated Creatinine Clearance: 18.6 mL/min (A) (by C-G formula based on SCr of 1.83 mg/dL (H)).     Results from last 7 days   Lab Units 03/19/22  0737 03/18/22  0541 03/17/22  1019 03/16/22  1536   CREATININE mg/dL 1.83* 1.83* 1.36* 1.03*   WBC 10*3/mm3 12.97* 12.84*  --  17.54*     Assessment/Plan  Continue intermittent dosing with a goal level of 15-20 mcg/mL as SCr elevated today at 1.83 mg/dL.  As random level <20 mcg/mL at this time, plan for Vancomycin 500mg IVPB (~7mg/kg) x1 dose tonight when Vancomycin random level calculated to be ~15-16 mcg/mL  Next Level Date and Time: Vanc Random 3/20 evening  Pharmacy will continue to monitor patient changes and renal function. Daily SCr x3 more days on order and recommend at least daily SCr while on Vancomycin and Zosyn    Thank you for involving pharmacy in this patient's care. Please contact pharmacy with any questions or concerns.     -Oleg Whitney, PharmD, BCPS  "

## 2022-03-19 NOTE — PLAN OF CARE
Goal Outcome Evaluation:     Patient alert and oriented x4. Turned q2h. Bladder scan revealed 759cc, straight cathed with 600cc return. Bradycardic through shift. No complaints of pain or SOA. No acute distress noted.

## 2022-03-19 NOTE — PLAN OF CARE
Goal Outcome Evaluation:      Patient alert follows commands q2h turn prn pain meds no nausea noted.  All meds crushed with applesauce. Monitoring swallowing. Family wants a video swallow, patient has c/o difficulty swallowing previously. Incont care and skin care provided. Pure wick changed this shift. No acute distress noted will continue to monitor

## 2022-03-20 NOTE — PROGRESS NOTES
"  Infectious Diseases Progress Note    Micheal Thompson MD     [facility]  [place]: 3 days  Patient Identification:  Name: Gisela Gandara  Age: 89 y.o.  Sex: female  :  1932  MRN: [id number]         Primary Care Physician: Rigoberto Law MD            Subjective: Continues to improve denies any fever and chills.  Feels better.  Tolerating antibiotics..  Interval History: See consultation note.    Objective:    Scheduled Meds:[START ON 3/20/2022] aspirin, 81 mg, Oral, Daily  cloNIDine, 0.3 mg, Oral, Q8H  dilTIAZem CD, 300 mg, Oral, Q24H  FLUoxetine, 20 mg, Oral, Daily  gabapentin, 100 mg, Oral, Nightly  guaiFENesin, 400 mg, Oral, Q8H  insulin glargine, 10 Units, Subcutaneous, Nightly  insulin lispro, 0-7 Units, Subcutaneous, 4x Daily With Meals & Nightly  insulin lispro, 3 Units, Subcutaneous, TID With Meals  lansoprazole, 30 mg, Oral, BID AC  [START ON 3/20/2022] metoprolol tartrate, 25 mg, Oral, Q12H  piperacillin-tazobactam, 3.375 g, Intravenous, Q12H  pravastatin, 40 mg, Oral, Nightly  valsartan, 320 mg, Oral, Q24H  [START ON 3/20/2022] vancomycin, 500 mg, Intravenous, Q24H  Vancomycin Pharmacy Intermittent Dosing, , Does not apply, Daily      Continuous Infusions:Pharmacy to dose vancomycin,   sodium chloride, 75 mL/hr, Last Rate: 75 mL/hr (22 1427)        Vital signs in last 24 hours:  Temp:  [97.6 °F (36.4 °C)-98.5 °F (36.9 °C)] 97.6 °F (36.4 °C)  Heart Rate:  [50-55] 52  Resp:  [18] 18  BP: (117-181)/(50-63) 142/63    Intake/Output:    Intake/Output Summary (Last 24 hours) at 3/19/2022 2101  Last data filed at 3/19/2022 1646  Gross per 24 hour   Intake 1300 ml   Output 600 ml   Net 700 ml       Exam:  /63 (BP Location: Left arm, Patient Position: Lying)   Pulse 52   Temp 97.6 °F (36.4 °C) (Oral)   Resp 18   Ht 157.5 cm (62.01\")   Wt 69.3 kg (152 lb 12.5 oz)   SpO2 98%   BMI 27.94 kg/m²   Patient is examined using the personal protective equipment as per guidelines from " infection control for this particular patient as enacted.  Hand washing was performed before and after patient interaction.  General Appearance:  Resting comfortably                          Head:    Normocephalic, without obvious abnormality, atraumatic                           Eyes:    PERRL, conjunctivae/corneas clear, EOM's intact, both eyes                         Throat:   Lips, tongue, gums normal; oral mucosa pink and moist                           Neck:   Supple, symmetrical, trachea midline, no JVD                         Lungs:    Clear to auscultation bilaterally, respirations unlabored                 Chest Wall:    No tenderness or deformity                          Heart:  S1-S2 regular                  Abdomen:   Soft nontender                 Extremities: Right AKA and left foot great toe and second toe amputated                        Pulses:   Pulses palpable in all extremities                            Skin:   Skin is warm and dry,  no rashes or palpable lesions                  Neurologic: Grossly nonfocal       Data Review:    I reviewed the patient's new clinical results.  Results from last 7 days   Lab Units 03/19/22  0737 03/18/22  0541 03/16/22  1536   WBC 10*3/mm3 12.97* 12.84* 17.54*   HEMOGLOBIN g/dL 10.2* 10.3* 13.6   PLATELETS 10*3/mm3 332 307 387     Results from last 7 days   Lab Units 03/19/22  0737 03/18/22  0541 03/17/22  1019 03/16/22  1536   SODIUM mmol/L 136 135* 136 135*   POTASSIUM mmol/L 3.1* 3.9 4.4 4.3   CHLORIDE mmol/L 102 101 101 100   CO2 mmol/L 21.0* 20.7* 23.0 23.0   BUN mg/dL 37* 34* 27* 22   CREATININE mg/dL 1.83* 1.83* 1.36* 1.03*   CALCIUM mg/dL 7.9* 8.3 8.8 9.1   GLUCOSE mg/dL 58* 148* 203* 142*       Microbiology Results (last 10 days)     Procedure Component Value - Date/Time    CANDIDA AURIS SCREEN - Swab, Axilla Right, Axilla Left and Groin [[id number]]  (Normal) Collected: 03/18/22 7642    Lab Status: Preliminary result Specimen: Swab from Axilla Right,  Axilla Left and Groin Updated: 03/19/22 1903     Candida Auris Screen Culture No Candida auris isolated at 24 hours    MRSA Screen, PCR (Inpatient) - Swab, Nares [[id number]]  (Abnormal) Collected: 03/17/22 1532    Lab Status: Final result Specimen: Swab from Nares Updated: 03/17/22 1644     MRSA PCR MRSA Detected    COVID PRE-OP / PRE-PROCEDURE SCREENING ORDER (NO ISOLATION) - Swab, Nasopharynx [[id number]]  (Normal) Collected: 03/16/22 1950    Lab Status: Final result Specimen: Swab from Nasopharynx Updated: 03/16/22 2030    Narrative:      The following orders were created for panel order COVID PRE-OP / PRE-PROCEDURE SCREENING ORDER (NO ISOLATION) - Swab, Nasopharynx.  Procedure                               Abnormality         Status                     ---------                               -----------         ------                     COVID-19,[facility]...[[id number]]  Normal              Final result                 Please view results for these tests on the individual orders.    COVID-19,[facility] CEPHEID/ELBERT NP SWAB IN TRANSPORT MEDIA 8-12 HR TAT - Swab, Nasopharynx [[id number]]  (Normal) Collected: 03/16/22 1950    Lab Status: Final result Specimen: Swab from Nasopharynx Updated: 03/16/22 2030     COVID19 Not Detected    Narrative:      Fact sheet for providers: https://www.fda.gov/media/573828/download    Fact sheet for patients: https://www.fda.gov/media/915106/download    Test performed by PCR.    Blood Culture - Blood, Arm, Left [[id number]]  (Normal) Collected: 03/16/22 1846    Lab Status: Preliminary result Specimen: Blood from Arm, Left Updated: 03/19/22 1903     Blood Culture No growth at 3 days    Blood Culture - Blood, Arm, Left [[id number]]  (Normal) Collected: 03/16/22 1846    Lab Status: Preliminary result Specimen: Blood from Arm, Left Updated: 03/19/22 1903     Blood Culture No growth at 3 days          Assessment:    Pneumonia of left lower lobe due to infectious organism  1-toxic  metabolic encephalopathy secondary to  2-ongoing systemic infection with sepsis due to:              -Healthcare associated pneumonia              -And urinary tract infection  3-peripheral vascular disease status post right AKA and recent amputation of the left second toe and prior history of amputation of left great toe.  4-diabetes mellitus  5-acute kidney injury on chronic kidney disease  6-positive MRSA screen  7-other diagnosis per primary team.        Recommendations/Discussions:  Continue present treatment  See my recommendations and discussion on 3/18/2022.  Discussed with Dr. Banda.  Micheal Thompson MD  3/19/2022  21:01 EDT    Much of this encounter note is an electronic transcription/translation of spoken language to printed text. The electronic translation of spoken language may permit erroneous, or at times, nonsensical words or phrases to be inadvertently transcribed; Although I have reviewed the note for such errors, some may still exist

## 2022-03-20 NOTE — PROGRESS NOTES
"Daily progress note    Chief complaint  Doing same  No specific complaints but found to be bradycardic  Denies fever cough shortness of breath    History of present illness  89-year-old -American male with history of peripheral artery disease right above-knee amputation diabetes mellitus hypertension hyperlipidemia chronic kidney disease stage III who is a nursing home resident and well-known to our service sent to the ER with altered mental status.  Patient also have difficulty eating but no nausea vomiting cough fever chest pain or increased shortness of breath.  Patient work-up in ER revealed acute UTI and left lower lobe pneumonia Healthcare associated admit for management.     REVIEW OF SYSTEMS  Unremarkable except generalized weakness     PHYSICAL EXAM   Blood pressure 142/65, pulse 53, temperature 97.3 °F (36.3 °C), temperature source Oral, resp. rate 18, height 157.5 cm (62.01\"), weight 69.3 kg (152 lb 12.5 oz), SpO2 99 %.    GENERAL: alert well developed, well nourished in no distress, obese.   HENT: NCAT, neck supple, trachea midline  EYES: no scleral icterus, PERRL, normal conjunctivae  CV: regular rhythm, regular rate, no murmur  RESPIRATORY: unlabored effort, CTAB  ABDOMEN: soft, nontender, nondistended, bowel sounds present  MUSCULOSKELETAL: no gross deformity. Right AKA. Stump intact. Left toe amputation incision site appears intact, midway down incision there is slight dehiscence but no active drainage or bleeding appreciated. Suture closure noted which appears intact. No erythema or warmth.   NEURO: alert,  sensory and motor function of extremities grossly intact, speech clear, mental status normal/baseline  SKIN: warm, dry, no rash  PSYCH:  Appropriate mood and affect     LAB RESULTS  Lab Results (last 24 hours)     Procedure Component Value Units Date/Time    POC Glucose Once [[id number]]  (Normal) Collected: 03/20/22 1116    Specimen: Blood Updated: 03/20/22 1119     Glucose 116 mg/dL      " Comment: Meter: [id number] : [id number] Nilda [place]       Basic Metabolic Panel [[id number]]  (Abnormal) Collected: 03/20/22 0845    Specimen: Blood Updated: 03/20/22 0921     Glucose 71 mg/dL      BUN 28 mg/dL      Creatinine 1.26 mg/dL      Sodium 139 mmol/L      Potassium 4.7 mmol/L      Chloride 109 mmol/L      CO2 18.2 mmol/L      Calcium 8.0 mg/dL      BUN/Creatinine Ratio 22.2     Anion Gap 11.8 mmol/L      eGFR 40.6 mL/min/1.73      Comment: National Kidney Foundation and American Society of Nephrology (ASN) Task Force recommended calculation based on the Chronic Kidney Disease Epidemiology Collaboration (CKD-EPI) equation refit without adjustment for race.       Narrative:      GFR Normal >60  Chronic Kidney Disease <60  Kidney Failure <15      CBC & Differential [[id number]]  (Abnormal) Collected: 03/20/22 0845    Specimen: Blood Updated: 03/20/22 0900    Narrative:      The following orders were created for panel order CBC & Differential.  Procedure                               Abnormality         Status                     ---------                               -----------         ------                     CBC Auto Differential[[id number]]        Abnormal            Final result                 Please view results for these tests on the individual orders.    CBC Auto Differential [[id number]]  (Abnormal) Collected: 03/20/22 0845    Specimen: Blood Updated: 03/20/22 0900     WBC 10.86 10*3/mm3      RBC 3.57 10*6/mm3      Hemoglobin 10.5 g/dL      Hematocrit 32.1 %      MCV 89.9 fL      MCH 29.4 pg      MCHC 32.7 g/dL      RDW 14.4 %      RDW-SD 46.4 fl      MPV 9.8 fL      Platelets 360 10*3/mm3      Neutrophil % 71.3 %      Lymphocyte % 17.3 %      Monocyte % 10.0 %      Eosinophil % 0.7 %      Basophil % 0.2 %      Immature Grans % 0.5 %      Neutrophils, Absolute 7.74 10*3/mm3      Lymphocytes, Absolute 1.88 10*3/mm3      Monocytes, Absolute 1.09 10*3/mm3      Eosinophils, Absolute  0.08 10*3/mm3      Basophils, Absolute 0.02 10*3/mm3      Immature Grans, Absolute 0.05 10*3/mm3      nRBC 0.0 /100 WBC     POC Glucose Once [[id number]]  (Normal) Collected: 03/20/22 0742    Specimen: Blood Updated: 03/20/22 0744     Glucose 81 mg/dL      Comment: Meter: [id number] : [id number] iKlo Campbell RN       POC Glucose Once [[id number]]  (Abnormal) Collected: 03/20/22 0624    Specimen: Blood Updated: 03/20/22 0625     Glucose 60 mg/dL      Comment: Meter: [id number] : [id number] Dl CA       POC Glucose Once [[id number]]  (Normal) Collected: 03/19/22 2037    Specimen: Blood Updated: 03/19/22 2039     Glucose 75 mg/dL      Comment: Meter: [id number] : [id number] Dl CA       CANDIDA AURIS SCREEN - Swab, Axilla Right, Axilla Left and Groin [[id number]]  (Normal) Collected: 03/18/22 1752    Specimen: Swab from Axilla Right, Axilla Left and Groin Updated: 03/19/22 1903     Candida Auris Screen Culture No Candida auris isolated at 24 hours    Blood Culture - Blood, Arm, Left [[id number]]  (Normal) Collected: 03/16/22 1846    Specimen: Blood from Arm, Left Updated: 03/19/22 1903     Blood Culture No growth at 3 days    Blood Culture - Blood, Arm, Left [[id number]]  (Normal) Collected: 03/16/22 1846    Specimen: Blood from Arm, Left Updated: 03/19/22 1903     Blood Culture No growth at 3 days    POC Glucose Once [[id number]]  (Normal) Collected: 03/19/22 1639    Specimen: Blood Updated: 03/19/22 1640     Glucose 88 mg/dL      Comment: Meter: [id number] : [id number] Nilda CA           Imaging Results (Last 24 Hours)     ** No results found for the last 24 hours. **        ECG 12 Lead  Component     QT Interval   ms 345              HEART RATE= 99  bpm  RR Interval= 608  ms  KS Interval= 174  ms  P Horizontal Axis= -2  deg  P Front Axis= 29  deg  QRSD Interval= 119  ms  QT Interval= 345  ms  QRS Axis= -69  deg  T Wave Axis= 110  deg  - ABNORMAL ECG -  Sinus  rhythm  Probable left atrial enlargement  Nonspecific IVCD with LAD  Left ventricular hypertrophy  Inferior infarct, acute  Anterior infarct, old  Poor quality tracing repeat               Current Facility-Administered Medications:   •  acetaminophen (TYLENOL) tablet 500 mg, 500 mg, Oral, Q6H PRN, Rigoberto Law MD  •  aspirin chewable tablet 81 mg, 81 mg, Oral, Daily, Rigoberto Law MD, 81 mg at 03/20/22 0847  •  cloNIDine (CATAPRES) tablet 0.3 mg, 0.3 mg, Oral, Q8H, Rigoberto Law MD, 0.3 mg at 03/20/22 0534  •  dilTIAZem CD (CARDIZEM CD) 24 hr capsule 300 mg, 300 mg, Oral, Q24H, Rigoberto Law MD, 300 mg at 03/20/22 1155  •  docusate sodium (COLACE) capsule 100 mg, 100 mg, Oral, Daily PRN, Rigoberto Law MD, 100 mg at 03/18/22 1744  •  FLUoxetine (PROzac) capsule 20 mg, 20 mg, Oral, Daily, Rigoberto Law MD, 20 mg at 03/20/22 0847  •  gabapentin (NEURONTIN) capsule 100 mg, 100 mg, Oral, Nightly, Rigoberto Law MD, 100 mg at 03/19/22 2125  •  guaifenesin (ROBITUSSIN) 100 MG/5ML liquid 200 mg, 200 mg, Oral, Q4H PRN, Rigoberto Law MD  •  guaiFENesin tablet 400 mg, 400 mg, Oral, Q8H, Rigoberto Law MD, 400 mg at 03/20/22 0534  •  hydrALAZINE (APRESOLINE) injection 10 mg, 10 mg, Intravenous, Q4H PRN, Rigoberto Law MD  •  HYDROcodone-acetaminophen (NORCO) 5-325 MG per tablet 1 tablet, 1 tablet, Oral, Q4H PRN, Rigoberto Law MD, 1 tablet at 03/19/22 2125  •  insulin glargine (LANTUS, SEMGLEE) injection 10 Units, 10 Units, Subcutaneous, Nightly, Rigoberto Law MD, 10 Units at 03/19/22 2125  •  insulin lispro (ADMELOG) injection 0-7 Units, 0-7 Units, Subcutaneous, 4x Daily With Meals & Nightly, Rigoebrto Law MD, 3 Units at 03/18/22 1744  •  insulin lispro (ADMELOG) injection 3 Units, 3 Units, Subcutaneous, TID With Meals, Rigoberto Law MD  •  ipratropium-albuterol (DUO-NEB) nebulizer solution 3 mL, 3 mL, Nebulization, Q4H PRN, Rigoberto Law MD  •  lansoprazole (FIRST) oral suspension 30 mg, 30 mg, Oral, BID AC, Rigoberto Law MD, 30  mg at 03/20/22 0847  •  melatonin tablet 3 mg, 3 mg, Oral, Nightly PRN, Vance Law MD  •  Pharmacy to dose vancomycin, , Does not apply, Continuous PRN, Vance Law MD  •  piperacillin-tazobactam (ZOSYN) 3.375 g in iso-osmotic dextrose 50 ml (premix), 3.375 g, Intravenous, Q8H, Vance Law MD, 3.375 g at 03/20/22 1155  •  potassium chloride (K-DUR,KLOR-CON) ER tablet 40 mEq, 40 mEq, Oral, PRN, Vance Law MD, 40 mEq at 03/20/22 0344  •  potassium chloride (KLOR-CON) packet 40 mEq, 40 mEq, Oral, PRN, Vance Law MD, 40 mEq at 03/19/22 2254  •  pravastatin (PRAVACHOL) tablet 40 mg, 40 mg, Oral, Nightly, Vance Law MD, 40 mg at 03/19/22 2125  •  [COMPLETED] Insert peripheral IV, , , Once **AND** sodium chloride 0.9 % flush 10 mL, 10 mL, Intravenous, PRN, Gabriella Bradford, APRN, 10 mL at 03/19/22 2126  •  sodium chloride 0.9 % infusion, 75 mL/hr, Intravenous, Continuous, Vance Law MD, Last Rate: 75 mL/hr at 03/20/22 0724, 75 mL/hr at 03/20/22 0724  •  valsartan (DIOVAN) tablet 320 mg, 320 mg, Oral, Q24H, Vance Law MD, 320 mg at 03/20/22 0847  •  Vancomycin Pharmacy Intermittent Dosing, , Does not apply, Daily, Vance Law MD     ASSESSMENT  Left lower lobe pneumonia aspiration with MRSA screen positive  Acute UTI  Sinus bradycardia  Diabetes mellitus  Hypertension  Hyperlipidemia peripheral artery disease s/p right AKA  Immobilization syndrome  Gastroesophageal reflux disease    PLAN  CPM  Continue IV antibiotics per infectious disease  Hold beta-blocker  Insulin dose adjusted  Adjust nursing home medications  Stress ulcer DVT prophylaxis  Cardiology consult  Infectious disease to follow patient   Supportive care  DNR  PT/OT  Discussed with family nursing staff  Follow closely further recommendation current hospital course    VANCE LAW MD

## 2022-03-20 NOTE — PLAN OF CARE
Goal Outcome Evaluation:     Patient alert and oriented. No complaints of pain. Lose bowel movement x3. Voiding spontaneously but still retaining urine, straight cath x1. Turned q2. Bradycardic, metoprolol held per physicians orders. Pills whole with water. No acute distress noted.

## 2022-03-20 NOTE — PROGRESS NOTES
"[facility] Clinical Pharmacy Services: Vancomycin Monitoring Note  Gisela Gandara is a 89 y.o. female who is on day 5 of pharmacy to dose vancomycin for Sepsis secondary to PNA and UTI  ID following - Dr. Thompson    Previous Vancomycin Dose: pulse dosing- last dose received = 500mg IVPB x1 3/19 at 2309  Other Antimicrobials: Zosyn 3.375gm IV q8h EI    Updated Cultures and Sensitivities:   3/16: BCx-NG3d  3/17: MRSA nares: MRSA detected  3/18 Candida auris screen: preliminary No Candida auris isolated at 24 hours     Results from last 7 days   Lab Units 03/19/22  0737 03/18/22  1804   VANCOMYCIN RM mcg/mL 19.60 24.30     Vitals/Labs  Ht: 157.5 cm (62.01\"); Wt: 69.3 kg (152 lb 12.5 oz)     Temp Readings from Last 1 Encounters:   03/20/22 97.3 °F (36.3 °C) (Oral)     Estimated Creatinine Clearance: 27.1 mL/min (A) (by C-G formula based on SCr of 1.26 mg/dL (H)).     Results from last 7 days   Lab Units 03/20/22  0845 03/19/22  0737 03/18/22  0541   CREATININE mg/dL 1.26* 1.83* 1.83*   WBC 10*3/mm3 10.86* 12.97* 12.84*     Assessment/Plan  SCr improved to 1.26 mg/dL Continue intermittent dosing for now  Next Level Date and Time: Vanc Random 3/20 evening.  Will ask 3rd shift RPh to review when level is available.    Pharmacy will continue to monitor patient changes and renal function. Daily SCr x2 more days on order and recommend at least daily SCr while on Vancomycin and Zosyn    Thank you for involving pharmacy in this patient's care. Please contact pharmacy with any questions or concerns.     -Oleg Whitney, DiamanteD, BCPS  "

## 2022-03-21 NOTE — CASE MANAGEMENT/SOCIAL WORK
Continued Stay Note  [facility]     Patient Name: Gisela Gandara  MRN: [id number]  Today's Date: 3/21/2022    Admit Date: 3/16/2022     Discharge Plan     Row Name 03/21/22 1518       Plan    Plan Return to [facility], pending precert    Plan Comments Spoke with Kofi with [facility], anticipating discharge in 1-2 days, she will initiate SNF precert today. Will continue to monitor for new or changing discharge needs.  Tomasa WILLOUGHBY RN CCP               Discharge Codes    No documentation.               Expected Discharge Date and Time     Expected Discharge Date Expected Discharge Time    Mar 23, 2022             Tomasa Kevin RN

## 2022-03-21 NOTE — PLAN OF CARE
Goal Outcome Evaluation:         Patient alert follows commands q2h turn. Incont care and skin care provided. Barrier cream applied no c/o pain or nausea noted. Iv antibx given. Iv fluids infusing. Meds  crushed in applesauce. No acute distress noted will continue to monitor

## 2022-03-21 NOTE — PROGRESS NOTES
"  Infectious Diseases Progress Note    Micheal Thompson MD     [facility]  [place]: 5 days  Patient Identification:  Name: Gisela Gandara  Age: 89 y.o.  Sex: female  :  1932  MRN: [id number]         Primary Care Physician: Rigoberto Law MD            Subjective: feeling better and denies any fever and chills.  Interval History: See consultation note.    Objective:    Scheduled Meds:aspirin, 81 mg, Oral, Daily  cloNIDine, 0.3 mg, Oral, Q8H  dilTIAZem CD, 300 mg, Oral, Q24H  FLUoxetine, 20 mg, Oral, Daily  gabapentin, 100 mg, Oral, Nightly  guaiFENesin, 400 mg, Oral, Q8H  insulin glargine, 10 Units, Subcutaneous, Nightly  insulin lispro, 0-7 Units, Subcutaneous, 4x Daily With Meals & Nightly  lansoprazole, 30 mg, Oral, BID AC  piperacillin-tazobactam, 3.375 g, Intravenous, Q8H  pravastatin, 10 mg, Oral, Nightly  valsartan, 320 mg, Oral, Q24H  vancomycin, 500 mg, Intravenous, Q24H      Continuous Infusions:Pharmacy to dose vancomycin,   sodium chloride, 75 mL/hr, Last Rate: 75 mL/hr (22 0724)        Vital signs in last 24 hours:  Temp:  [97.3 °F (36.3 °C)-98.3 °F (36.8 °C)] 98.3 °F (36.8 °C)  Heart Rate:  [50-60] 50  Resp:  [18] 18  BP: (155-191)/(59-80) 155/59    Intake/Output:    Intake/Output Summary (Last 24 hours) at 3/21/2022 0812  Last data filed at 3/21/2022 0325  Gross per 24 hour   Intake 250 ml   Output 500 ml   Net -250 ml       Exam:  /59 (BP Location: Right arm, Patient Position: Lying)   Pulse 50   Temp 98.3 °F (36.8 °C) (Oral)   Resp 18   Ht 157.5 cm (62.01\")   Wt 69.3 kg (152 lb 12.5 oz)   SpO2 98%   BMI 27.94 kg/m²   Patient is examined using the personal protective equipment as per guidelines from infection control for this particular patient as enacted.  Hand washing was performed before and after patient interaction.  General Appearance:  Resting comfortably                          Head:    Normocephalic, without obvious abnormality, atraumatic                  "          Eyes:    PERRL, conjunctivae/corneas clear, EOM's intact, both eyes                         Throat:   Lips, tongue, gums normal; oral mucosa pink and moist                           Neck:   Supple, symmetrical, trachea midline, no JVD                         Lungs:    Clear to auscultation bilaterally, respirations unlabored                 Chest Wall:    No tenderness or deformity                          Heart:  S1-S2 regular                  Abdomen:   Soft nontender                 Extremities: Right AKA and left foot great toe and second toe amputated                        Pulses:   Pulses palpable in all extremities                            Skin:   Skin is warm and dry,  no rashes or palpable lesions                  Neurologic: Grossly nonfocal       Data Review:    I reviewed the patient's new clinical results.  Results from last 7 days   Lab Units 03/21/22  0711 03/20/22  0845 03/19/22  0737 03/18/22  0541 03/16/22  1536   WBC 10*3/mm3 9.17 10.86* 12.97* 12.84* 17.54*   HEMOGLOBIN g/dL 10.1* 10.5* 10.2* 10.3* 13.6   PLATELETS 10*3/mm3 341 360 332 307 387     Results from last 7 days   Lab Units 03/20/22  0845 03/19/22  0737 03/18/22  0541 03/17/22  1019 03/16/22  1536   SODIUM mmol/L 139 136 135* 136 135*   POTASSIUM mmol/L 4.7 3.1* 3.9 4.4 4.3   CHLORIDE mmol/L 109* 102 101 101 100   CO2 mmol/L 18.2* 21.0* 20.7* 23.0 23.0   BUN mg/dL 28* 37* 34* 27* 22   CREATININE mg/dL 1.26* 1.83* 1.83* 1.36* 1.03*   CALCIUM mg/dL 8.0* 7.9* 8.3 8.8 9.1   GLUCOSE mg/dL 71 58* 148* 203* 142*       Microbiology Results (last 10 days)     Procedure Component Value - Date/Time    CANDIDA AURIS SCREEN - Swab, Axilla Right, Axilla Left and Groin [[id number]]  (Normal) Collected: 03/18/22 1752    Lab Status: Preliminary result Specimen: Swab from Axilla Right, Axilla Left and Groin Updated: 03/19/22 1903     Candida Auris Screen Culture No Candida auris isolated at 24 hours    MRSA Screen, PCR (Inpatient) - Swab,  Nares [[id number]]  (Abnormal) Collected: 03/17/22 1532    Lab Status: Final result Specimen: Swab from Nares Updated: 03/17/22 1644     MRSA PCR MRSA Detected    COVID PRE-OP / PRE-PROCEDURE SCREENING ORDER (NO ISOLATION) - Swab, Nasopharynx [[id number]]  (Normal) Collected: 03/16/22 1950    Lab Status: Final result Specimen: Swab from Nasopharynx Updated: 03/16/22 2030    Narrative:      The following orders were created for panel order COVID PRE-OP / PRE-PROCEDURE SCREENING ORDER (NO ISOLATION) - Swab, Nasopharynx.  Procedure                               Abnormality         Status                     ---------                               -----------         ------                     COVID-19,[facility]-HOU[facility]...[[id number]]  Normal              Final result                 Please view results for these tests on the individual orders.    COVID-19,[facility]HOU[facility] CEPHEID/ELBERT NP SWAB IN TRANSPORT MEDIA 8-12 HR TAT - Swab, Nasopharynx [[id number]]  (Normal) Collected: 03/16/22 1950    Lab Status: Final result Specimen: Swab from Nasopharynx Updated: 03/16/22 2030     COVID19 Not Detected    Narrative:      Fact sheet for providers: https://www.fda.gov/media/588220/download    Fact sheet for patients: https://www.fda.gov/media/455006/download    Test performed by PCR.    Blood Culture - Blood, Arm, Left [[id number]]  (Normal) Collected: 03/16/22 1846    Lab Status: Preliminary result Specimen: Blood from Arm, Left Updated: 03/20/22 1903     Blood Culture No growth at 4 days    Blood Culture - Blood, Arm, Left [[id number]]  (Normal) Collected: 03/16/22 1846    Lab Status: Preliminary result Specimen: Blood from Arm, Left Updated: 03/20/22 1903     Blood Culture No growth at 4 days          Assessment:    Pneumonia of left lower lobe due to infectious organism  1-toxic metabolic encephalopathy secondary to  2-ongoing systemic infection with sepsis due to:              -Healthcare associated pneumonia              -And  urinary tract infection  3-peripheral vascular disease status post right AKA and recent amputation of the left second toe and prior history of amputation of left great toe.  4-diabetes mellitus  5-acute kidney injury on chronic kidney disease  6-positive MRSA screen  7-other diagnosis per primary team.        Recommendations/Discussions:  · Continue present treatment  · See my recommendations and discussion on 3/18/2022.  · Discussed with Dr. Banda.  Micheal Thompson MD  3/21/2022  08:12 EDT    Much of this encounter note is an electronic transcription/translation of spoken language to printed text. The electronic translation of spoken language may permit erroneous, or at times, nonsensical words or phrases to be inadvertently transcribed; Although I have reviewed the note for such errors, some may still exist

## 2022-03-21 NOTE — CONSULTS
[facility]  Cardiology Consult Note    Patient Identification:  Name: Gisela Gandara  Age: 89 y.o.  Sex: female  :  1932  MRN: [id number]             Requesting Physician:  Dr. HERMAN Law    Reason for Consultation / Chief Complaint:  Bradycardia  History of Present Illness:   This is a 89-year-old female, who is new to our service.  She has a history to include diabetes mellitus, hypertension, hyperlipidemia, CKD stage III, above-knee amputation, and arthritis.  She comes in consult for bradycardia.  She presented to [facility] with altered mental status and difficulty eating.  She was admitted for medical management for acute UTI and left lower lobe pneumonia.  No noted ischemic work-up in the computer.    On admission CT of chest showed pneumonia, see full report as below.  Checks x-ray showed some increased left basilar disease which probably represents atelectasis.    Comorbid cardiac risk factors: Hypertension, hyperlipidemia, CKD, diabetes mellitus, age    Past Medical History:  Past Medical History:   Diagnosis Date    Arthritis     Diabetes mellitus (HCC)     Hyperlipidemia     Hypertension      Past Surgical History:  History reviewed. No pertinent surgical history.   Allergies:  No Known Allergies  Home Meds:  Medications Prior to Admission   Medication Sig Dispense Refill Last Dose    acetaminophen (TYLENOL) 500 MG tablet Take 500 mg by mouth Every 6 (Six) Hours As Needed for Mild Pain .       aspirin 81 MG EC tablet Take 81 mg by mouth Daily.       cetirizine (zyrTEC) 10 MG tablet Take 10 mg by mouth Daily.       cloNIDine (CATAPRES) 0.1 MG tablet Take 0.1 mg by mouth 2 (Two) Times a Day.       Diclofenac Sodium (VOLTAREN) 1 % gel gel Apply 4 g topically to the appropriate area as directed 2 (Two) Times a Day.       dilTIAZem CD (CARDIZEM CD) 240 MG 24 hr capsule Take 300 mg by mouth Daily.   Patient Taking Differently at Unknown time    gabapentin (NEURONTIN)  100 MG capsule Take 100 mg by mouth Every Night.       guaiFENesin (ROBITUSSIN) 100 MG/5ML solution oral solution Take 200 mg by mouth Every 4 (Four) Hours As Needed.       melatonin 3 MG tablet Take 3 mg by mouth Every Night.       metoprolol succinate XL (TOPROL-XL) 100 MG 24 hr tablet Take 100 mg by mouth Daily.       pravastatin (PRAVACHOL) 40 MG tablet Take 40 mg by mouth Daily.       valsartan-hydrochlorothiazide (DIOVAN-HCT) 320-12.5 MG per tablet Take 1 tablet by mouth Daily.       vitamin B-12 (CYANOCOBALAMIN) 500 MCG tablet Take 500 mcg by mouth Daily.       azithromycin (ZITHROMAX) 250 MG tablet Take 250 mg by mouth Daily.       FLUoxetine (PROzac) 20 MG capsule Take 20 mg by mouth Daily.       insulin detemir (LEVEMIR) 100 UNIT/ML injection Inject 15 Units under the skin into the appropriate area as directed Daily.  12     insulin lispro (humaLOG) 100 UNIT/ML injection Inject 0-24 Units under the skin into the appropriate area as directed 4 (Four) Times a Day With Meals & at Bedtime.  12     multivitamin with minerals (Centrum Silver Ultra Womens) tablet tablet Take 1 tablet by mouth Daily.       omeprazole (priLOSEC) 20 MG capsule Take 20 mg by mouth 2 (Two) Times a Day.       polyethylene glycol (MIRALAX) 17 g packet Take 17 g by mouth Daily As Needed.       spironolactone (ALDACTONE) 25 MG tablet Take 25 mg by mouth Daily.        Current Meds:    Scheduled    Medication Ordered Dose/Rate, Route, Frequency Last Action   aspirin chewable tablet 81 mg 81 mg, PO, Daily Given, 81 mg at 03/21 0904   cloNIDine (CATAPRES) tablet 0.3 mg 0.3 mg, PO, Q8H Given, 0.3 mg at 03/21 0614   dilTIAZem CD (CARDIZEM CD) 24 hr capsule 180 mg 180 mg, PO, Q24H Ordered   FLUoxetine (PROzac) capsule 20 mg 20 mg, PO, Daily Given, 20 mg at 03/21 0904   gabapentin (NEURONTIN) capsule 100 mg 100 mg, PO, Nightly Given, 100 mg at 03/20 2038   guaiFENesin tablet 400 mg 400 mg, PO, Q8H Given, 400 mg at 03/21 0615   insulin glargine  (LANTUS, SEMGLEE) injection 10 Units 10 Units, SC, Nightly Given, 10 Units at 03/19 2125   insulin lispro (ADMELOG) injection 0-7 Units 0-7 Units, SC, 4x Daily With Meals & Nightly Given, 2 Units at 03/20 1743   lansoprazole (FIRST) oral suspension 30 mg 30 mg, PO, BID AC Given, 30 mg at 03/21 0904   piperacillin-tazobactam (ZOSYN) 3.375 g in iso-osmotic dextrose 50 ml (premix) 3.375 g, IV, Q8H New Bag, 3.375 g at 03/21 1151   pravastatin (PRAVACHOL) tablet 10 mg 10 mg, PO, Nightly Given, 10 mg at 03/20 2038   valsartan (DIOVAN) tablet 320 mg 320 mg, PO, Q24H Given, 320 mg at 03/21 0904   vancomycin 500 mg/100 mL 0.9% NS IVPB (mbp) 500 mg, IV, Q24H New Bag, 500 mg at 03/21 0100     Continuous    Medication Ordered Dose/Rate, Route, Frequency Last Action   sodium chloride 0.9 % infusion 75 mL/hr, IV, Continuous New Bag, 75 mL/hr at 03/20 0724     PRN    Medication Ordered Dose/Rate, Route, Frequency Last Action   acetaminophen (TYLENOL) tablet 500 mg 500 mg, PO, Q6H PRN Ordered   docusate sodium (COLACE) capsule 100 mg 100 mg, PO, Daily PRN Given, 100 mg at 03/18 1744   guaifenesin (ROBITUSSIN) 100 MG/5ML liquid 200 mg 200 mg, PO, Q4H PRN Ordered   hydrALAZINE (APRESOLINE) injection 10 mg 10 mg, IV, Q4H PRN Ordered   HYDROcodone-acetaminophen (NORCO) 5-325 MG per tablet 1 tablet 1 tablet, PO, Q4H PRN Given, 1 tablet at 03/19 2125   ipratropium-albuterol (DUO-NEB) nebulizer solution 3 mL 3 mL, NEBULIZATION, Q4H PRN Ordered   melatonin tablet 3 mg 3 mg, PO, Nightly PRN Ordered   Pharmacy to dose vancomycin No Dose/Rate, XX, Continuous PRN Ordered   potassium chloride (K-DUR,KLOR-CON) ER tablet 40 mEq 40 mEq, PO, PRN Given, 40 mEq at 03/20 0344   potassium chloride (KLOR-CON) packet 40 mEq 40 mEq, PO, PRN Given, 40 mEq at 03/19 2254   sodium chloride 0.9 % flush 10 mL (And Linked Group #1) 10 mL, IV, PRN Given, 10 mL at 03/19 2126       Social History:   Social History     Tobacco Use    Smoking status: Former Smoker     "Smokeless tobacco: Former User   Substance Use Topics    Alcohol use: Not on file      Family History:  History reviewed. No pertinent family history.     Review of Systems  Constitutional: No wt loss, fever   Gastrointestinal: No nausea , abdominal pain  Behavioral/Psych: No insomnia or anxiety   Cardiovascular ----positive for WEAKNESS. All other systems reviewed and are negative            Constitutional:  Temp:  [97.3 °F (36.3 °C)-98.3 °F (36.8 °C)] 98.3 °F (36.8 °C)  Heart Rate:  [50-60] 50  Resp:  [18] 18  BP: (155-191)/(59-80) 155/59             Physical Exam  /53 (BP Location: Right arm, Patient Position: Lying)   Pulse 71   Temp 98.5 °F (36.9 °C) (Oral)   Resp 18   Ht 157.5 cm (62.01\")   Wt 69.3 kg (152 lb 12.5 oz)   SpO2 100%   BMI 27.94 kg/m²     General appearance: No acute changes   Eyes: Sclerae conjunctivae normal, pupils reactive   HENT: Atraumatic; oropharynx clear with moist mucous membranes and no mucosal ulcerations;  Neck: Trachea midline; NECK, supple, no thyromegaly or lymphadenopathy   Lungs: Normal size and shape, normal breath sounds, equal distribution of air, no rales and rhonchi   CV: S1-S2 regular, no murmurs, no rub, no gallop   Abdomen: Soft, nontender; no masses , no abnormal abdominal sounds   Extremities: No deformity , normal color , no peripheral edema   Skin: Normal temperature, turgor and texture; no rash, ulcers  Psych: Appropriate affect, alert and oriented to person, place and time                 Cardiographics  ECG:   SB with decrease HR compared to prior ecg      Telemetry:        Echocardiogram: pending    Imaging  Chest X-ray:   IMPRESSION:  1. Moderate left lower lobe pneumonia.  2. Hepatic and renal cysts.  3. Small bowel segments are at the upper limits of normal for diameter.  This may be related to mild ileus. It is unlikely to represent a  low-grade partial small bowel obstruction but if clinically indicated,  follow-up plain radiographs may be " helpful.     Radiation dose reduction techniques were utilized, including automated  exposure control and exposure modulation based on body size.     This report was finalized on 3/16/2022 10:40 PM by Dr. Rush Millan M.D.       Lab Review           Results from last 7 days   Lab Units 03/21/22  0711   SODIUM mmol/L 140   POTASSIUM mmol/L 3.6   BUN mg/dL 15   CREATININE mg/dL 0.79   CALCIUM mg/dL 8.3        Results from last 7 days   Lab Units 03/21/22  0711 03/20/22  0845 03/19/22  0737   WBC 10*3/mm3 9.17 10.86* 12.97*   HEMOGLOBIN g/dL 10.1* 10.5* 10.2*   HEMATOCRIT % 31.7* 32.1* 31.6*   PLATELETS 10*3/mm3 341 360 332         The following medical decision was discussed in detail with Dr. aMrtinez      Assessment:  Left lower lobe pneumonia aspiration with MRSA screen positive  Sinus bradycardia  Pauses: Pauses were less than 3  Diabetes mellitus  Hypertension  Hyperlipidemia  Immobilization  PAD status post right AKA  GERD  Acute UTI    Recommendations / Plan:   This is a 89-year-old female, who is new to our service.  She has a history to include hypertension, diabetes mellitus, and hyperlipidemia.  She is here for medical management of pneumonia and UTI.  It was noted sinus bradycardia with heart rates in the 50s.  Asymptomatic.  Agree with holding beta-blocker. Morning dose of Cardizem was held due to bradycardia.  Noted 2 pauses that were less than 3 seconds long.  At this time we will do an echo, EKG, troponin, ZIO at discharge.  Further recommendations once echo has been read.    Labs/tests ordered for am: Echo, EKG, troponin, ZIO at discharge, decrease clonidine, discontinue Cardizem, and add amlodipine.    Yennifer Brandt, APRN  3/21/2022, 11:22 EDT    Patient personally interviewed and above subjective findings personally confirmed during a face to face contact with patient today  All findings of physical examination confirmed  All pertinent and performed labs, cardiac procedures ,   radiographs of the last 24 hours personally reviewed  Impression and plans discussed/elaborated and implemented jointly as described above     Mike Martinez MD          EMR Dragon/Transcription:   Dictated utilizing Dragon dictation

## 2022-03-21 NOTE — PROGRESS NOTES
"Daily progress note    Chief complaint  Doing better  No specific complaints but found to be bradycardic  Denies fever cough shortness of breath    History of present illness  89-year-old -American male with history of peripheral artery disease right above-knee amputation diabetes mellitus hypertension hyperlipidemia chronic kidney disease stage III who is a nursing home resident and well-known to our service sent to the ER with altered mental status.  Patient also have difficulty eating but no nausea vomiting cough fever chest pain or increased shortness of breath.  Patient work-up in ER revealed acute UTI and left lower lobe pneumonia [facility] associated admit for management.     REVIEW OF SYSTEMS  Unremarkable except generalized weakness     PHYSICAL EXAM   Blood pressure 155/59, pulse 50, temperature 98.3 °F (36.8 °C), temperature source Oral, resp. rate 18, height 157.5 cm (62.01\"), weight 69.3 kg (152 lb 12.5 oz), SpO2 98 %.    GENERAL: alert well developed, well nourished in no distress, obese.   HENT: NCAT, neck supple, trachea midline  EYES: no scleral icterus, PERRL, normal conjunctivae  CV: regular rhythm, regular rate, no murmur  RESPIRATORY: unlabored effort, CTAB  ABDOMEN: soft, nontender, nondistended, bowel sounds present  MUSCULOSKELETAL: no gross deformity. Right AKA. Stump intact. Left toe amputation incision site appears intact, midway down incision there is slight dehiscence but no active drainage or bleeding appreciated. Suture closure noted which appears intact. No erythema or warmth.   NEURO: alert,  sensory and motor function of extremities grossly intact, speech clear, mental status normal/baseline  SKIN: warm, dry, no rash  PSYCH:  Appropriate mood and affect     LAB RESULTS  Lab Results (last 24 hours)     Procedure Component Value Units Date/Time    POC Glucose Once [[id number]]  (Abnormal) Collected: 03/21/22 1101    Specimen: Blood Updated: 03/21/22 1103     Glucose 172 mg/dL  "     Comment: Meter: [id number] : [id number] Oz Cole CNA       Basic Metabolic Panel [[id number]]  (Abnormal) Collected: 03/21/22 0711    Specimen: Blood Updated: 03/21/22 0853     Glucose 78 mg/dL      BUN 15 mg/dL      Creatinine 0.79 mg/dL      Sodium 140 mmol/L      Potassium 3.6 mmol/L      Chloride 111 mmol/L      CO2 20.0 mmol/L      Calcium 8.3 mg/dL      BUN/Creatinine Ratio 19.0     Anion Gap 9.0 mmol/L      eGFR 71.2 mL/min/1.73      Comment: National Kidney Foundation and American Society of Nephrology (ASN) Task Force recommended calculation based on the Chronic Kidney Disease Epidemiology Collaboration (CKD-EPI) equation refit without adjustment for race.       Narrative:      GFR Normal >60  Chronic Kidney Disease <60  Kidney Failure <15      CBC & Differential [[id number]]  (Abnormal) Collected: 03/21/22 0711    Specimen: Blood Updated: 03/21/22 0811    Narrative:      The following orders were created for panel order CBC & Differential.  Procedure                               Abnormality         Status                     ---------                               -----------         ------                     CBC Auto Differential[[id number]]        Abnormal            Final result                 Please view results for these tests on the individual orders.    CBC Auto Differential [[id number]]  (Abnormal) Collected: 03/21/22 0711    Specimen: Blood Updated: 03/21/22 0811     WBC 9.17 10*3/mm3      RBC 3.56 10*6/mm3      Hemoglobin 10.1 g/dL      Hematocrit 31.7 %      MCV 89.0 fL      MCH 28.4 pg      MCHC 31.9 g/dL      RDW 13.9 %      RDW-SD 44.7 fl      MPV 9.9 fL      Platelets 341 10*3/mm3      Neutrophil % 69.5 %      Lymphocyte % 19.2 %      Monocyte % 9.3 %      Eosinophil % 1.1 %      Basophil % 0.5 %      Immature Grans % 0.4 %      Neutrophils, Absolute 6.37 10*3/mm3      Lymphocytes, Absolute 1.76 10*3/mm3      Monocytes, Absolute 0.85 10*3/mm3      Eosinophils, Absolute 0.10  10*3/mm3      Basophils, Absolute 0.05 10*3/mm3      Immature Grans, Absolute 0.04 10*3/mm3      nRBC 0.0 /100 WBC     POC Glucose Once [[id number]]  (Normal) Collected: 03/21/22 0610    Specimen: Blood Updated: 03/21/22 0613     Glucose 73 mg/dL      Comment: Meter: [id number] : [id number] [place]       Vancomycin, Random [[id number]]  (Normal) Collected: 03/20/22 2318    Specimen: Blood Updated: 03/20/22 2355     Vancomycin Random 16.80 mcg/mL     Narrative:      Therapeutic Ranges for Vancomycin    Vancomycin Random   5.0-40.0 mcg/mL  Vancomycin Trough   5.0-20.0 mcg/mL  Vancomycin Peak     20.0-40.0 mcg/mL    POC Glucose Once [[id number]]  (Normal) Collected: 03/20/22 2042    Specimen: Blood Updated: 03/20/22 2044     Glucose 97 mg/dL      Comment: Meter: [id number] : [id number] [place]       Blood Culture - Blood, Arm, Left [[id number]]  (Normal) Collected: 03/16/22 1846    Specimen: Blood from Arm, Left Updated: 03/20/22 1903     Blood Culture No growth at 4 days    Blood Culture - Blood, Arm, Left [[id number]]  (Normal) Collected: 03/16/22 1846    Specimen: Blood from Arm, Left Updated: 03/20/22 1903     Blood Culture No growth at 4 days    POC Glucose Once [[id number]]  (Abnormal) Collected: 03/20/22 1627    Specimen: Blood Updated: 03/20/22 1630     Glucose 155 mg/dL      Comment: Meter: [id number] : [id number] [place]           Imaging Results (Last 24 Hours)     ** No results found for the last 24 hours. **        ECG 12 Lead  Component     QT Interval   ms 345              HEART RATE= 99  bpm  RR Interval= 608  ms  WI Interval= 174  ms  P Horizontal Axis= -2  deg  P Front Axis= 29  deg  QRSD Interval= 119  ms  QT Interval= 345  ms  QRS Axis= -69  deg  T Wave Axis= 110  deg  - ABNORMAL ECG -  Sinus rhythm  Probable left atrial enlargement  Nonspecific IVCD with LAD  Left ventricular hypertrophy  Inferior infarct, acute  Anterior infarct, old  Poor quality  tracing repeat               Current Facility-Administered Medications:   •  acetaminophen (TYLENOL) tablet 500 mg, 500 mg, Oral, Q6H PRN, Rigoberto Law MD  •  aspirin chewable tablet 81 mg, 81 mg, Oral, Daily, Rigoberto Law MD, 81 mg at 03/21/22 0904  •  cloNIDine (CATAPRES) tablet 0.3 mg, 0.3 mg, Oral, Q8H, Rigoberto Law MD, 0.3 mg at 03/21/22 0614  •  [START ON 3/22/2022] dilTIAZem CD (CARDIZEM CD) 24 hr capsule 180 mg, 180 mg, Oral, Q24H, Yennifer Brandt APRN  •  docusate sodium (COLACE) capsule 100 mg, 100 mg, Oral, Daily PRN, Rigoberto Law MD, 100 mg at 03/18/22 1744  •  FLUoxetine (PROzac) capsule 20 mg, 20 mg, Oral, Daily, Rigoberto Law MD, 20 mg at 03/21/22 0904  •  gabapentin (NEURONTIN) capsule 100 mg, 100 mg, Oral, Nightly, Rigoberto Law MD, 100 mg at 03/20/22 2038  •  guaifenesin (ROBITUSSIN) 100 MG/5ML liquid 200 mg, 200 mg, Oral, Q4H PRN, Rigoberto Law MD  •  guaiFENesin tablet 400 mg, 400 mg, Oral, Q8H, Rigoberto Law MD, 400 mg at 03/21/22 0615  •  hydrALAZINE (APRESOLINE) injection 10 mg, 10 mg, Intravenous, Q4H PRN, Rigoberto Law MD  •  HYDROcodone-acetaminophen (NORCO) 5-325 MG per tablet 1 tablet, 1 tablet, Oral, Q4H PRN, Rigoberto Law MD, 1 tablet at 03/19/22 2125  •  insulin glargine (LANTUS, SEMGLEE) injection 10 Units, 10 Units, Subcutaneous, Nightly, Rigoberto Law MD, 10 Units at 03/19/22 2125  •  insulin lispro (ADMELOG) injection 0-7 Units, 0-7 Units, Subcutaneous, 4x Daily With Meals & Nightly, Rigoberto Law MD, 2 Units at 03/20/22 1743  •  ipratropium-albuterol (DUO-NEB) nebulizer solution 3 mL, 3 mL, Nebulization, Q4H PRN, Rigoberto Law MD  •  lansoprazole (FIRST) oral suspension 30 mg, 30 mg, Oral, BID AC, Rigoberto Law MD, 30 mg at 03/21/22 0904  •  melatonin tablet 3 mg, 3 mg, Oral, Nightly PRN, Rigoberto Law MD  •  Pharmacy to dose vancomycin, , Does not apply, Continuous PRN, Rigoberto Law MD  •  piperacillin-tazobactam (ZOSYN) 3.375 g in iso-osmotic dextrose 50 ml (premix),  3.375 g, Intravenous, Q8H, Vance Law MD, 3.375 g at 03/21/22 1151  •  potassium chloride (K-DUR,KLOR-CON) ER tablet 40 mEq, 40 mEq, Oral, PRN, Vance Law MD, 40 mEq at 03/20/22 0344  •  potassium chloride (KLOR-CON) packet 40 mEq, 40 mEq, Oral, PRN, Vance Law MD, 40 mEq at 03/19/22 2254  •  pravastatin (PRAVACHOL) tablet 10 mg, 10 mg, Oral, Nightly, Vance Law MD, 10 mg at 03/20/22 2038  •  [COMPLETED] Insert peripheral IV, , , Once **AND** sodium chloride 0.9 % flush 10 mL, 10 mL, Intravenous, PRN, Gabriella Bradford, APRN, 10 mL at 03/19/22 2126  •  sodium chloride 0.9 % infusion, 75 mL/hr, Intravenous, Continuous, Vance Law MD, Last Rate: 75 mL/hr at 03/20/22 0724, 75 mL/hr at 03/20/22 0724  •  valsartan (DIOVAN) tablet 320 mg, 320 mg, Oral, Q24H, Vance Law MD, 320 mg at 03/21/22 0904  •  vancomycin 500 mg/100 mL 0.9% NS IVPB (mbp), 500 mg, Intravenous, Q24H, Vance Law MD, 500 mg at 03/21/22 0100     ASSESSMENT  Left lower lobe pneumonia aspiration with MRSA screen positive  Acute UTI  Sinus bradycardia  Diabetes mellitus  Hypertension  Hyperlipidemia peripheral artery disease s/p right AKA  Immobilization syndrome  Gastroesophageal reflux disease    PLAN  CPM  Continue IV antibiotics per infectious disease  Hold beta-blocker and adjust Cardizem dose  Insulin dose adjusted  Adjust nursing home medications  Stress ulcer DVT prophylaxis  Cardiology consult appreciated  Infectious disease to follow patient   Supportive care  DNR  PT/OT  Discussed with family nursing staff  Follow closely further recommendation current hospital course    VANCE LAW MD

## 2022-03-21 NOTE — PLAN OF CARE
Goal Outcome Evaluation:  Plan of Care Reviewed With: patient           Outcome Evaluation: Patient alert and oriented x4. Denied pain, nausea and SOA. Turned and repostioned q2. Echo done this shift. Receving IV abx. IV fluids discontinued.

## 2022-03-21 NOTE — PROGRESS NOTES
[facility] Clinical Pharmacy Services: Vancomycin Level Monitoring Note    Gisela Gandara is a 89 y.o. female who is on day 6/7 of pharmacy to dose vancomycin for sepsis secondary to pneumonia/UTI.    Estimated Creatinine Clearance: 27.1 mL/min (A) (by C-G formula based on SCr of 1.26 mg/dL (H)).    Current Vanc Dose: 500 mg IV every  24  hours  Results from last 7 days   Lab Units 03/20/22  2318 03/19/22  0737 03/18/22  1804   VANCOMYCIN RM mcg/mL 16.80 19.60 24.30   Predicted AUC at current dose:445 mg/L.hr  Next Level Date and Time: No further levels anticipated unless duration is extended    No changes at this time. Pharmacy is continuing to monitor and will adjust as needed.    Vel Sierra III, [facility]  Clinical Pharmacist

## 2022-03-22 NOTE — PROGRESS NOTES
"Daily progress note    Chief complaint  Doing better  No new complaints  Making good progress  Denies fever cough shortness of breath    History of present illness  89-year-old -American male with history of peripheral artery disease right above-knee amputation diabetes mellitus hypertension hyperlipidemia chronic kidney disease stage III who is a nursing home resident and well-known to our service sent to the ER with altered mental status.  Patient also have difficulty eating but no nausea vomiting cough fever chest pain or increased shortness of breath.  Patient work-up in ER revealed acute UTI and left lower lobe pneumonia [facility] associated admit for management.     REVIEW OF SYSTEMS  Unremarkable except generalized weakness     PHYSICAL EXAM   Blood pressure 173/70, pulse 90, temperature 98.5 °F (36.9 °C), temperature source Oral, resp. rate 18, height 157.5 cm (62.01\"), weight 69.3 kg (152 lb 12.5 oz), SpO2 97 %.    GENERAL: alert well developed, well nourished in no distress, obese.   HENT: NCAT, neck supple, trachea midline  EYES: no scleral icterus, PERRL, normal conjunctivae  CV: regular rhythm, regular rate, no murmur  RESPIRATORY: unlabored effort, CTAB  ABDOMEN: soft, nontender, nondistended, bowel sounds present  MUSCULOSKELETAL: no gross deformity. Right AKA. Stump intact. Left toe amputation incision site appears intact, midway down incision there is slight dehiscence but no active drainage or bleeding appreciated. Suture closure noted which appears intact. No erythema or warmth.   NEURO: alert,  sensory and motor function of extremities grossly intact, speech clear, mental status normal/baseline  SKIN: warm, dry, no rash  PSYCH:  Appropriate mood and affect     LAB RESULTS  Lab Results (last 24 hours)     Procedure Component Value Units Date/Time    POC Glucose Once [[id number]]  (Abnormal) Collected: 03/22/22 1136    Specimen: Blood Updated: 03/22/22 1139     Glucose 217 mg/dL      " Comment: Meter: [id number] : [id number] [facility]       POC Glucose Once [[id number]]  (Normal) Collected: 03/22/22 0629    Specimen: Blood Updated: 03/22/22 0632     Glucose 106 mg/dL      Comment: Meter: [id number] : [id number] [place]       Basic Metabolic Panel [[id number]]  (Abnormal) Collected: 03/22/22 0317    Specimen: Blood Updated: 03/22/22 0402     Glucose 102 mg/dL      BUN 10 mg/dL      Creatinine 0.69 mg/dL      Sodium 141 mmol/L      Potassium 3.2 mmol/L      Chloride 112 mmol/L      CO2 18.1 mmol/L      Calcium 8.1 mg/dL      BUN/Creatinine Ratio 14.5     Anion Gap 10.9 mmol/L      eGFR 82.6 mL/min/1.73      Comment: National Kidney Foundation and American Society of Nephrology (ASN) Task Force recommended calculation based on the Chronic Kidney Disease Epidemiology Collaboration (CKD-EPI) equation refit without adjustment for race.       Narrative:      GFR Normal >60  Chronic Kidney Disease <60  Kidney Failure <15      Troponin [[id number]]  (Normal) Collected: 03/22/22 0317    Specimen: Blood Updated: 03/22/22 0402     Troponin T <0.010 ng/mL     Narrative:      Troponin T Reference Range:  <= 0.03 ng/mL-   Negative for AMI  >0.03 ng/mL-     Abnormal for myocardial necrosis.  Clinicians would have to utilize clinical acumen, EKG, Troponin and serial changes to determine if it is an Acute Myocardial Infarction or myocardial injury due to an underlying chronic condition.       Results may be falsely decreased if patient taking Biotin.      CBC & Differential [[id number]]  (Abnormal) Collected: 03/22/22 0317    Specimen: Blood Updated: 03/22/22 0347    Narrative:      The following orders were created for panel order CBC & Differential.  Procedure                               Abnormality         Status                     ---------                               -----------         ------                     CBC Auto Differential[[id number]]        Abnormal             Final result                 Please view results for these tests on the individual orders.    CBC Auto Differential [[id number]]  (Abnormal) Collected: 03/22/22 0317    Specimen: Blood Updated: 03/22/22 0347     WBC 11.84 10*3/mm3      RBC 3.51 10*6/mm3      Hemoglobin 10.2 g/dL      Hematocrit 31.0 %      MCV 88.3 fL      MCH 29.1 pg      MCHC 32.9 g/dL      RDW 14.2 %      RDW-SD 45.5 fl      MPV 9.8 fL      Platelets 366 10*3/mm3      Neutrophil % 68.3 %      Lymphocyte % 19.4 %      Monocyte % 11.2 %      Eosinophil % 0.4 %      Basophil % 0.3 %      Immature Grans % 0.4 %      Neutrophils, Absolute 8.07 10*3/mm3      Lymphocytes, Absolute 2.30 10*3/mm3      Monocytes, Absolute 1.33 10*3/mm3      Eosinophils, Absolute 0.05 10*3/mm3      Basophils, Absolute 0.04 10*3/mm3      Immature Grans, Absolute 0.05 10*3/mm3      nRBC 0.0 /100 WBC     POC Glucose Once [[id number]]  (Abnormal) Collected: 03/21/22 2048    Specimen: Blood Updated: 03/21/22 2051     Glucose 210 mg/dL      Comment: Meter: [id number] : [id number] [place]       Blood Culture - Blood, Arm, Left [[id number]]  (Normal) Collected: 03/16/22 1846    Specimen: Blood from Arm, Left Updated: 03/21/22 1903     Blood Culture No growth at 5 days    Blood Culture - Blood, Arm, Left [[id number]]  (Normal) Collected: 03/16/22 1846    Specimen: Blood from Arm, Left Updated: 03/21/22 1903     Blood Culture No growth at 5 days    CANDIDA AURIS SCREEN - Swab, Axilla Right, Axilla Left and Groin [[id number]]  (Normal) Collected: 03/18/22 1752    Specimen: Swab from Axilla Right, Axilla Left and Groin Updated: 03/21/22 1903     Candida Auris Screen Culture No Candida auris isolated at 3 days    POC Glucose Once [[id number]]  (Abnormal) Collected: 03/21/22 1555    Specimen: Blood Updated: 03/21/22 1556     Glucose 246 mg/dL      Comment: Meter: [id number] : [id number] Oz Cole CNA           Imaging Results (Last 24 Hours)     ** No results  found for the last 24 hours. **        ECG 12 Lead  Component     QT Interval   ms 345              HEART RATE= 99  bpm  RR Interval= 608  ms  [place] Interval= 174  ms  P Horizontal Axis= -2  deg  P Front Axis= 29  deg  QRSD Interval= 119  ms  QT Interval= 345  ms  QRS Axis= -69  deg  T Wave Axis= 110  deg  - ABNORMAL ECG -  Sinus rhythm  Probable left atrial enlargement  Nonspecific IVCD with LAD  Left ventricular hypertrophy  Inferior infarct, acute  Anterior infarct, old  Poor quality tracing repeat               Current Facility-Administered Medications:   •  acetaminophen (TYLENOL) tablet 500 mg, 500 mg, Oral, Q6H PRN, Rigoberto Law MD  •  amLODIPine (NORVASC) tablet 10 mg, 10 mg, Oral, Q24H, Yennifer Brandt APRN, 10 mg at 03/22/22 1018  •  aspirin chewable tablet 81 mg, 81 mg, Oral, Daily, Rigoberto Law MD, 81 mg at 03/22/22 1018  •  cloNIDine (CATAPRES) tablet 0.1 mg, 0.1 mg, Oral, Q8H, Yennifer Brandt APRN, 0.1 mg at 03/22/22 0605  •  docusate sodium (COLACE) capsule 100 mg, 100 mg, Oral, Daily PRN, Rigoberto Law MD, 100 mg at 03/18/22 1744  •  FLUoxetine (PROzac) capsule 20 mg, 20 mg, Oral, Daily, Rigoberto Law MD, 20 mg at 03/22/22 1018  •  gabapentin (NEURONTIN) capsule 100 mg, 100 mg, Oral, Nightly, Rigoberto Law MD, 100 mg at 03/21/22 2131  •  guaifenesin (ROBITUSSIN) 100 MG/5ML liquid 200 mg, 200 mg, Oral, Q4H PRN, Rigoberto Law MD, 200 mg at 03/21/22 1607  •  guaiFENesin tablet 400 mg, 400 mg, Oral, Q8H, Rigoberto Law MD, 400 mg at 03/22/22 0604  •  hydrALAZINE (APRESOLINE) injection 10 mg, 10 mg, Intravenous, Q4H PRN, Rigoberto Law MD, 10 mg at 03/22/22 0604  •  HYDROcodone-acetaminophen (NORCO) 5-325 MG per tablet 1 tablet, 1 tablet, Oral, Q4H PRN, Rigoberto Law MD, 1 tablet at 03/19/22 2125  •  insulin glargine (LANTUS, SEMGLEE) injection 5 Units, 5 Units, Subcutaneous, Nightly, Rigoberto Law MD, 5 Units at 03/21/22 2128  •  insulin lispro (ADMELOG) injection 0-7 Units, 0-7 Units,  Subcutaneous, 4x Daily With Meals & Nightly, Vance Law MD, 3 Units at 03/21/22 2128  •  ipratropium-albuterol (DUO-NEB) nebulizer solution 3 mL, 3 mL, Nebulization, Q4H PRN, Vance Law MD  •  lansoprazole (FIRST) oral suspension 30 mg, 30 mg, Oral, BID AC, Vance Law MD, 30 mg at 03/22/22 1018  •  melatonin tablet 3 mg, 3 mg, Oral, Nightly PRN, Vance Law MD  •  piperacillin-tazobactam (ZOSYN) 3.375 g in iso-osmotic dextrose 50 ml (premix), 3.375 g, Intravenous, Q8H, Vance Law MD, 3.375 g at 03/22/22 1150  •  potassium chloride (K-DUR,KLOR-CON) ER tablet 40 mEq, 40 mEq, Oral, PRN, Vance Law MD, 40 mEq at 03/20/22 0344  •  potassium chloride (KLOR-CON) packet 40 mEq, 40 mEq, Oral, PRN, Vance Law MD, 40 mEq at 03/19/22 2254  •  pravastatin (PRAVACHOL) tablet 10 mg, 10 mg, Oral, Nightly, Vance Law MD, 10 mg at 03/21/22 2131  •  [COMPLETED] Insert peripheral IV, , , Once **AND** sodium chloride 0.9 % flush 10 mL, 10 mL, Intravenous, PRN, Gabriella Bradford APRN, 10 mL at 03/19/22 2126  •  valsartan (DIOVAN) tablet 320 mg, 320 mg, Oral, Q24H, Vance Law MD, 320 mg at 03/22/22 1018     ASSESSMENT  Left lower lobe pneumonia aspiration with MRSA screen positive  Acute UTI  Sinus bradycardia  Diabetes mellitus  Hypertension  Hyperlipidemia peripheral artery disease s/p right AKA  Immobilization syndrome  Gastroesophageal reflux disease    PLAN  CPM  Continue IV antibiotics for 1 week  Continue to hold beta-blocker and Cardizem   Insulin dose adjusted  Adjust nursing home medications  Stress ulcer DVT prophylaxis  Cardiology consult appreciated  Infectious disease to follow patient   Supportive care  DNR  PT/OT  Discussed with family nursing staff  Discharge planning    VANCE LAW MD

## 2022-03-22 NOTE — PLAN OF CARE
Goal Outcome Evaluation:  Plan of Care Reviewed With: patient           Outcome Evaluation: patient alert and oriented. Turned q2. Denied pain nausea and SOA. Tolerating room air. Elevated BP. Other VSS. Nursing will continue to monitor.

## 2022-03-22 NOTE — PROGRESS NOTES
"[facility] Clinical Pharmacy Services: Vancomycin Pharmacokinetic Initial Consult Note    Gisela Gandara is a 89 y.o. female who is on day 7 of pharmacy to dose vancomycin for pneumonia.    Indication: pneumonia  Consulting Provider: Dr. Thompson  Planned Duration of Therapy: TBD.  New pharmacy consult ordered x7 days.  Vancomycin initially started on 3/16/22.  Loading Dose Ordered or Given: NA  MRSA PCR performed: 3/17; Result: positive  Culture/Source:   3/16 blood: NG  Target: -600 mg/L.hr   Other Antimicrobials: Zosyn 3.375g IV q8h    Vitals/Labs  Ht: 157.5 cm (62.01\"); Wt: 69.3 kg (152 lb 12.5 oz)  Temp Readings from Last 1 Encounters:   03/22/22 98.5 °F (36.9 °C) (Oral)    Estimated Creatinine Clearance: 49.4 mL/min (by C-G formula based on SCr of 0.69 mg/dL).       Results from last 7 days   Lab Units 03/22/22  0317 03/21/22  0711 03/20/22  0845   CREATININE mg/dL 0.69 0.79 1.26*   WBC 10*3/mm3 11.84* 9.17 10.86*     Assessment/Plan:    Dramatic improvement in Scr in recent days.  Had been pulse dosed on vancomycin, most recently on vancomycin 500mg IV q24h.  SCR has continued to improve.  Cautiously resume scheduled vancomycin at 750mg IV q24h.   Last dose given 0200 this am so will resume next dose at 00:00 on 3/23.  Predictive AUC level for the dose ordered is 413 mg/L.hr, which is within the target of 400-600 mg/L.hr  Vanc Trough has been ordered for 3/24 at 23:30     Pharmacy will follow patient's kidney function and will adjust doses and obtain levels as necessary. Thank you for involving pharmacy in this patient's care. Please contact pharmacy with any questions or concerns.                           Clarisa Avilez, PharmD  Clinical Pharmacist    "

## 2022-03-22 NOTE — PROGRESS NOTES
"[facility]  Cardiology Progress Note    Patient Identification:  Name: Gisela Gandara  Age: 89 y.o.  Sex: female  :  1932  MRN: [id number]                 Follow Up / Chief Complaint: follow up on bradycardia    Interval History: SR on monitor with no events over night.       Subjective:  No chest pains or tightness in the chest    Objective:    Past Medical History:  Past Medical History:   Diagnosis Date    Arthritis     Diabetes mellitus (HCC)     Hyperlipidemia     Hypertension      Past Surgical History:  History reviewed. No pertinent surgical history.     Social History:   Social History     Tobacco Use    Smoking status: Former Smoker    Smokeless tobacco: Former User   Substance Use Topics    Alcohol use: Not on file      Family History:  History reviewed. No pertinent family history.       Allergies:  No Known Allergies  Scheduled Meds:  amLODIPine, 10 mg, Q24H  aspirin, 81 mg, Daily  cloNIDine, 0.2 mg, Q8H  FLUoxetine, 20 mg, Daily  gabapentin, 100 mg, Nightly  guaiFENesin, 400 mg, Q8H  insulin glargine, 5 Units, Nightly  insulin lispro, 0-7 Units, 4x Daily With Meals & Nightly  lansoprazole, 30 mg, BID AC  piperacillin-tazobactam, 3.375 g, Q8H  potassium chloride, 20 mEq, Daily  pravastatin, 10 mg, Nightly  valsartan, 320 mg, Q24H            INTAKE AND OUTPUT:    Intake/Output Summary (Last 24 hours) at 3/22/2022 1529  Last data filed at 3/21/2022 2132  Gross per 24 hour   Intake 100 ml   Output --   Net 100 ml     ROS  Constitutional: Awake and alert, no fever. No nosebleeds  Abdomen           no abdominal pain   Cardiac              no chest pain  Pulmonary          no shortness of breath      /70 (BP Location: Left arm, Patient Position: Lying)   Pulse 90   Temp 98.5 °F (36.9 °C) (Oral)   Resp 18   Ht 157.5 cm (62.01\")   Wt 69.3 kg (152 lb 12.5 oz)   SpO2 97%   BMI 27.94 kg/m²   General appearance: No acute changes   Neck: Trachea midline; NECK, supple, no " thyromegaly or lymphadenopathy   Lungs: Normal size and shape, normal breath sounds, equal distribution of air, no rales or rhonchi   CV: S1-S2 regular, no murmurs, no rub, no gallop   Abdomen: Soft, nontender; no masses , no abnormal abdominal sounds   Extremities: No deformity , normal color , no peripheral edema   Skin: Normal temperature, turgor and texture; no rash, ulcers        Cardiographics  Telemetry:          SB with decrease HR compared to prior ecg       Telemetry:             Echocardiogram:     Interpretation Summary    Estimated right ventricular systolic pressure from tricuspid regurgitation is mildly elevated (35-45 mmHg).  Mild aortic valve stenosis is present.  Calculated left ventricular EF = 78.5% Estimated left ventricular EF was in agreement with the calculated left ventricular EF.  Left ventricular diastolic function is consistent with (grade I) impaired relaxation.  The right atrial cavity is borderline dilated.  There is no evidence of pericardial effusion. .       Imaging  Chest X-ray:   IMPRESSION:  1. Moderate left lower lobe pneumonia.  2. Hepatic and renal cysts.  3. Small bowel segments are at the upper limits of normal for diameter.  This may be related to mild ileus. It is unlikely to represent a  low-grade partial small bowel obstruction but if clinically indicated,  follow-up plain radiographs may be helpful.     Radiation dose reduction techniques were utilized, including automated  exposure control and exposure modulation based on body size.     This report was finalized on 3/16/2022 10:40 PM by Dr. Rush Millan M.D.        Lab Review   Results from last 7 days   Lab Units 03/22/22  0317   TROPONIN T ng/mL <0.010         Results from last 7 days   Lab Units 03/22/22  0317   SODIUM mmol/L 141   POTASSIUM mmol/L 3.2*   BUN mg/dL 10   CREATININE mg/dL 0.69   CALCIUM mg/dL 8.1*        Results from last 7 days   Lab Units 03/22/22  0317 03/21/22  0711 03/20/22  0845   WBC  "10*3/mm3 11.84* 9.17 10.86*   HEMOGLOBIN g/dL 10.2* 10.1* 10.5*   HEMATOCRIT % 31.0* 31.7* 32.1*   PLATELETS 10*3/mm3 366 341 360         The following medical decision was discussed in detail with Dr. Martinez    Assessment:  Left lower lobe pneumonia aspiration with MRSA screen positive  Sinus bradycardia  Pauses: Pauses were less than 3  Diabetes mellitus  Hypertension  Hyperlipidemia  Immobilization  PAD status post right AKA  GERD  Acute UTI  Hypokalemia: replaced         Plan:  SR on monitor with no events overnight. Continue to Hold BB and Cardizem. Add hydralazine for blood pressure. Monitor BP.  Replaced K+ with k+ protocol.  Mike Martinez MD       )3/22/2022       EMR Dragon/Transcription:   \"Dictated utilizing Dragon dictation\".     "

## 2022-03-22 NOTE — PLAN OF CARE
Goal Outcome Evaluation:  Plan of Care Reviewed With: patient      Pt rested quietly in bed.No c/o pain or SOA.Medicated per MAR.SBP elevated , PRN hydralazine given.IV antibiotics given.SR on the monitor.Q2 turns.No acute distress notes.WCTM

## 2022-03-22 NOTE — PROGRESS NOTES
"  Infectious Diseases Progress Note    Micheal Thompson MD     [facility]  Los: 6 days  Patient Identification:  Name: Gisela Gandara  Age: 89 y.o.  Sex: female  :  1932  MRN: [id number]         Primary Care Physician: Rigoberto Law MD            Subjective: Feeling better denies any fever and chills..  Interval History: See consultation note.    Objective:    Scheduled Meds:amLODIPine, 10 mg, Oral, Q24H  aspirin, 81 mg, Oral, Daily  cloNIDine, 0.1 mg, Oral, Q8H  FLUoxetine, 20 mg, Oral, Daily  gabapentin, 100 mg, Oral, Nightly  guaiFENesin, 400 mg, Oral, Q8H  insulin glargine, 5 Units, Subcutaneous, Nightly  insulin lispro, 0-7 Units, Subcutaneous, 4x Daily With Meals & Nightly  lansoprazole, 30 mg, Oral, BID AC  piperacillin-tazobactam, 3.375 g, Intravenous, Q8H  pravastatin, 10 mg, Oral, Nightly  valsartan, 320 mg, Oral, Q24H      Continuous Infusions:     Vital signs in last 24 hours:  Temp:  [98.1 °F (36.7 °C)-99.4 °F (37.4 °C)] 98.5 °F (36.9 °C)  Heart Rate:  [63-88] 88  Resp:  [18] 18  BP: (154-199)/(53-77) 154/53    Intake/Output:    Intake/Output Summary (Last 24 hours) at 3/22/2022 1216  Last data filed at 3/21/2022 2132  Gross per 24 hour   Intake 100 ml   Output --   Net 100 ml       Exam:  /53   Pulse 88   Temp 98.5 °F (36.9 °C) (Oral)   Resp 18   Ht 157.5 cm (62.01\")   Wt 69.3 kg (152 lb 12.5 oz)   SpO2 100%   BMI 27.94 kg/m²   Patient is examined using the personal protective equipment as per guidelines from infection control for this particular patient as enacted.  Hand washing was performed before and after patient interaction.  General Appearance:  Resting comfortably                          Head:    Normocephalic, without obvious abnormality, atraumatic                           Eyes:    PERRL, conjunctivae/corneas clear, EOM's intact, both eyes                         Throat:   Lips, tongue, gums normal; oral mucosa pink and moist                           " Neck:   Supple, symmetrical, trachea midline, no JVD                         Lungs:    Clear to auscultation bilaterally, respirations unlabored                 Chest Wall:    No tenderness or deformity                          Heart:  S1-S2 regular                  Abdomen:   Soft nontender                 Extremities: Right AKA and left foot great toe and second toe amputated                        Pulses:   Pulses palpable in all extremities                            Skin:   Skin is warm and dry,  no rashes or palpable lesions                  Neurologic: Grossly nonfocal       Data Review:    I reviewed the patient's new clinical results.  Results from last 7 days   Lab Units 03/22/22  0317 03/21/22  0711 03/20/22  0845 03/19/22  0737 03/18/22  0541 03/16/22  1536   WBC 10*3/mm3 11.84* 9.17 10.86* 12.97* 12.84* 17.54*   HEMOGLOBIN g/dL 10.2* 10.1* 10.5* 10.2* 10.3* 13.6   PLATELETS 10*3/mm3 366 341 360 332 307 387     Results from last 7 days   Lab Units 03/22/22  0317 03/21/22  0711 03/20/22  0845 03/19/22  0737 03/18/22  0541 03/17/22  1019 03/16/22  1536   SODIUM mmol/L 141 140 139 136 135* 136 135*   POTASSIUM mmol/L 3.2* 3.6 4.7 3.1* 3.9 4.4 4.3   CHLORIDE mmol/L 112* 111* 109* 102 101 101 100   CO2 mmol/L 18.1* 20.0* 18.2* 21.0* 20.7* 23.0 23.0   BUN mg/dL 10 15 28* 37* 34* 27* 22   CREATININE mg/dL 0.69 0.79 1.26* 1.83* 1.83* 1.36* 1.03*   CALCIUM mg/dL 8.1* 8.3 8.0* 7.9* 8.3 8.8 9.1   GLUCOSE mg/dL 102* 78 71 58* 148* 203* 142*       Microbiology Results (last 10 days)     Procedure Component Value - Date/Time    CANDIDA AURIS SCREEN - Swab, Axilla Right, Axilla Left and Groin [[id number]]  (Normal) Collected: 03/18/22 6582    Lab Status: Preliminary result Specimen: Swab from Axilla Right, Axilla Left and Groin Updated: 03/21/22 1903     Candida Auris Screen Culture No Candida auris isolated at 3 days    MRSA Screen, PCR (Inpatient) - Swab, Nares [[id number]]  (Abnormal) Collected: 03/17/22 1532    Lab  Status: Final result Specimen: Swab from Nares Updated: 03/17/22 1644     MRSA PCR MRSA Detected    COVID PRE-OP / PRE-PROCEDURE SCREENING ORDER (NO ISOLATION) - Swab, Nasopharynx [[id number]]  (Normal) Collected: 03/16/22 1950    Lab Status: Final result Specimen: Swab from Nasopharynx Updated: 03/16/22 2030    Narrative:      The following orders were created for panel order COVID PRE-OP / PRE-PROCEDURE SCREENING ORDER (NO ISOLATION) - Swab, Nasopharynx.  Procedure                               Abnormality         Status                     ---------                               -----------         ------                     COVID-19,[facility]HOU[facility]...[[id number]]  Normal              Final result                 Please view results for these tests on the individual orders.    COVID-19,[facility] CEPHEID/ELBERT NP SWAB IN TRANSPORT MEDIA 8-12 HR TAT - Swab, Nasopharynx [[id number]]  (Normal) Collected: 03/16/22 1950    Lab Status: Final result Specimen: Swab from Nasopharynx Updated: 03/16/22 2030     COVID19 Not Detected    Narrative:      Fact sheet for providers: https://www.fda.gov/media/754373/download    Fact sheet for patients: https://www.fda.gov/media/848216/download    Test performed by PCR.    Blood Culture - Blood, Arm, Left [[id number]]  (Normal) Collected: 03/16/22 1846    Lab Status: Final result Specimen: Blood from Arm, Left Updated: 03/21/22 1903     Blood Culture No growth at 5 days    Blood Culture - Blood, Arm, Left [[id number]]  (Normal) Collected: 03/16/22 1846    Lab Status: Final result Specimen: Blood from Arm, Left Updated: 03/21/22 1903     Blood Culture No growth at 5 days          Assessment:    Pneumonia of left lower lobe due to infectious organism  1-toxic metabolic encephalopathy secondary to  2-ongoing systemic infection with sepsis due to:              -Healthcare associated pneumonia              -And urinary tract infection  3-peripheral vascular disease status post right AKA  and recent amputation of the left second toe and prior history of amputation of left great toe.  4-diabetes mellitus  5-acute kidney injury on chronic kidney disease  6-positive MRSA screen  7-other diagnosis per primary team.        Recommendations/Discussions:  · Continue present treatment  · See my recommendations and discussion on 3/18/2022.  · Discussed with Dr. Banda.  Micheal Thompson MD  3/22/2022  12:16 EDT    Much of this encounter note is an electronic transcription/translation of spoken language to printed text. The electronic translation of spoken language may permit erroneous, or at times, nonsensical words or phrases to be inadvertently transcribed; Although I have reviewed the note for such errors, some may still exist

## 2022-03-23 NOTE — PROGRESS NOTES
"Daily progress note    Chief complaint  Doing better  No new complaints  Wants to go back to nursing home  Denies fever cough shortness of breath    History of present illness  89-year-old -American male with history of peripheral artery disease right above-knee amputation diabetes mellitus hypertension hyperlipidemia chronic kidney disease stage III who is a nursing home resident and well-known to our service sent to the ER with altered mental status.  Patient also have difficulty eating but no nausea vomiting cough fever chest pain or increased shortness of breath.  Patient work-up in ER revealed acute UTI and left lower lobe pneumonia Healthcare associated admit for management.     REVIEW OF SYSTEMS  Unremarkable except generalized weakness     PHYSICAL EXAM   Blood pressure 167/77, pulse 69, temperature 98.3 °F (36.8 °C), temperature source Oral, resp. rate 18, height 157.5 cm (62.01\"), weight 69.3 kg (152 lb 12.5 oz), SpO2 100 %.    GENERAL: alert well developed, well nourished in no distress, obese.   HENT: NCAT, neck supple, trachea midline  EYES: no scleral icterus, PERRL, normal conjunctivae  CV: regular rhythm, regular rate, no murmur  RESPIRATORY: unlabored effort, CTAB  ABDOMEN: soft, nontender, nondistended, bowel sounds present  MUSCULOSKELETAL: no gross deformity. Right AKA. Stump intact. Left toe amputation incision site appears intact, midway down incision there is slight dehiscence but no active drainage or bleeding appreciated. Suture closure noted which appears intact. No erythema or warmth.   NEURO: alert,  sensory and motor function of extremities grossly intact, speech clear, mental status normal/baseline  SKIN: warm, dry, no rash  PSYCH:  Appropriate mood and affect     LAB RESULTS  Lab Results (last 24 hours)     Procedure Component Value Units Date/Time    CANDIDA AURIS SCREEN - Swab, Axilla Right, Axilla Left and Groin [[id number]]  (Normal) Collected: 03/18/22 1752    Specimen: Swab " from Axilla Right, Axilla Left and Groin Updated: 03/23/22 1150     Candida Auris Screen Culture No Candida auris isolated at 5 days    POC Glucose Once [[id number]]  (Abnormal) Collected: 03/23/22 1121    Specimen: Blood Updated: 03/23/22 1123     Glucose 186 mg/dL      Comment: Meter[facility] [id number] : [id number] [facility]       Basic Metabolic Panel [[id number]]  (Abnormal) Collected: 03/23/22 0609    Specimen: Blood Updated: 03/23/22 0715     Glucose 107 mg/dL      BUN 8 mg/dL      Creatinine 0.73 mg/dL      Sodium 142 mmol/L      Potassium 3.2 mmol/L      Chloride 111 mmol/L      CO2 19.9 mmol/L      Calcium 8.2 mg/dL      BUN/Creatinine Ratio 11.0     Anion Gap 11.1 mmol/L      eGFR 78.2 mL/min/1.73      Comment: National Kidney Foundation and American Society of Nephrology (ASN) Task Force recommended calculation based on the Chronic Kidney Disease Epidemiology Collaboration (CKD-EPI) equation refit without adjustment for race.       Narrative:      GFR Normal >60  Chronic Kidney Disease <60  Kidney Failure <15      POC Glucose Once [[id number]]  (Normal) Collected: 03/23/22 0626    Specimen: Blood Updated: 03/23/22 0628     Glucose 121 mg/dL      Comment: Meter: [id number] : [id number] [place]       CBC & Differential [[id number]]  (Abnormal) Collected: 03/23/22 0609    Specimen: Blood Updated: 03/23/22 0622    Narrative:      The following orders were created for panel order CBC & Differential.  Procedure                               Abnormality         Status                     ---------                               -----------         ------                     CBC Auto Differential[[id number]]        Abnormal            Final result                 Please view results for these tests on the individual orders.    CBC Auto Differential [[id number]]  (Abnormal) Collected: 03/23/22 0609    Specimen: Blood Updated: 03/23/22 0622     WBC 11.56 10*3/mm3      RBC 3.45 10*6/mm3       Hemoglobin 10.1 g/dL      Hematocrit 31.1 %      MCV 90.1 fL      MCH 29.3 pg      MCHC 32.5 g/dL      RDW 14.7 %      RDW-SD 48.3 fl      MPV 9.3 fL      Platelets 351 10*3/mm3      Neutrophil % 62.0 %      Lymphocyte % 24.9 %      Monocyte % 11.5 %      Eosinophil % 1.0 %      Basophil % 0.3 %      Immature Grans % 0.3 %      Neutrophils, Absolute 7.15 10*3/mm3      Lymphocytes, Absolute 2.88 10*3/mm3      Monocytes, Absolute 1.33 10*3/mm3      Eosinophils, Absolute 0.12 10*3/mm3      Basophils, Absolute 0.04 10*3/mm3      Immature Grans, Absolute 0.04 10*3/mm3      nRBC 0.0 /100 WBC     POC Glucose Once [[id number]]  (Abnormal) Collected: 03/22/22 2041    Specimen: Blood Updated: 03/22/22 2042     Glucose 150 mg/dL      Comment: Meter: [id number] : [id number] [place]       POC Glucose Once [[id number]]  (Abnormal) Collected: 03/22/22 1613    Specimen: Blood Updated: 03/22/22 1615     Glucose 173 mg/dL      Comment: Meter: [id number] : [id number] [facility]           Imaging Results (Last 24 Hours)     ** No results found for the last 24 hours. **        ECG 12 Lead  Component     QT Interval   ms 345              HEART RATE= 99  bpm  RR Interval= 608  ms  WV Interval= 174  ms  P Horizontal Axis= -2  deg  P Front Axis= 29  deg  QRSD Interval= 119  ms  QT Interval= 345  ms  QRS Axis= -69  deg  T Wave Axis= 110  deg  - ABNORMAL ECG -  Sinus rhythm  Probable left atrial enlargement  Nonspecific IVCD with LAD  Left ventricular hypertrophy  Inferior infarct, acute  Anterior infarct, old  Poor quality tracing repeat               Current Facility-Administered Medications:   •  acetaminophen (TYLENOL) tablet 500 mg, 500 mg, Oral, Q6H PRN, Rigoberto Law MD  •  amLODIPine (NORVASC) tablet 10 mg, 10 mg, Oral, Q24H, Yennifer Brandt APRN, 10 mg at 03/23/22 0846  •  aspirin chewable tablet 81 mg, 81 mg, Oral, Daily, Rigoberto Law MD, 81 mg at 03/23/22 4634  •  cloNIDine (CATAPRES) tablet  0.2 mg, 0.2 mg, Oral, Q8H, Rigoberto Law MD, 0.2 mg at 03/23/22 1423  •  docusate sodium (COLACE) capsule 100 mg, 100 mg, Oral, Daily PRN, Rigoberto Law MD, 100 mg at 03/18/22 1744  •  FLUoxetine (PROzac) capsule 20 mg, 20 mg, Oral, Daily, Rigoberto Law MD, 20 mg at 03/23/22 0846  •  gabapentin (NEURONTIN) capsule 100 mg, 100 mg, Oral, Nightly, Rigoberto Law MD, 100 mg at 03/22/22 2051  •  guaifenesin (ROBITUSSIN) 100 MG/5ML liquid 200 mg, 200 mg, Oral, Q4H PRN, Rigoberto Law MD, 200 mg at 03/21/22 1607  •  guaiFENesin tablet 400 mg, 400 mg, Oral, Q8H, Rigoberto Law MD, 400 mg at 03/23/22 1423  •  hydrALAZINE (APRESOLINE) tablet 50 mg, 50 mg, Oral, Q8H, Yennifer Brandt APRN  •  HYDROcodone-acetaminophen (NORCO) 5-325 MG per tablet 1 tablet, 1 tablet, Oral, Q4H PRN, Rigoberto Law MD, 1 tablet at 03/19/22 2125  •  insulin glargine (LANTUS, SEMGLEE) injection 5 Units, 5 Units, Subcutaneous, Nightly, Rigoberto Law MD, 5 Units at 03/22/22 2100  •  insulin lispro (ADMELOG) injection 0-7 Units, 0-7 Units, Subcutaneous, 4x Daily With Meals & Nightly, Rigoberto Law MD, 2 Units at 03/23/22 1140  •  ipratropium-albuterol (DUO-NEB) nebulizer solution 3 mL, 3 mL, Nebulization, Q4H PRN, Rigoberto Law MD  •  lansoprazole (FIRST) oral suspension 30 mg, 30 mg, Oral, BID AC, Rigoberto Law MD, 30 mg at 03/23/22 0718  •  melatonin tablet 3 mg, 3 mg, Oral, Nightly PRN, Rigoberto Law MD  •  Pharmacy to dose vancomycin, , Does not apply, Continuous PRN, Micheal Thompson MD  •  piperacillin-tazobactam (ZOSYN) 3.375 g in iso-osmotic dextrose 50 ml (premix), 3.375 g, Intravenous, Q8H, Rigoberto Law MD, 3.375 g at 03/23/22 1140  •  potassium chloride (K-DUR,KLOR-CON) ER tablet 20 mEq, 20 mEq, Oral, Daily, Rigoberto Law MD, 20 mEq at 03/23/22 0846  •  potassium chloride (K-DUR,KLOR-CON) ER tablet 40 mEq, 40 mEq, Oral, PRN, Rigoberto Law MD, 40 mEq at 03/23/22 1429  •  potassium chloride (KLOR-CON) packet 40 mEq, 40 mEq, Oral, PRN, Thanh,  MD Vance, 40 mEq at 03/23/22 0846  •  pravastatin (PRAVACHOL) tablet 10 mg, 10 mg, Oral, Nightly, Vance Law MD, 10 mg at 03/22/22 2051  •  [COMPLETED] Insert peripheral IV, , , Once **AND** sodium chloride 0.9 % flush 10 mL, 10 mL, Intravenous, PRN, Gabriella Bradford, APRN, 10 mL at 03/19/22 2126  •  valsartan (DIOVAN) tablet 320 mg, 320 mg, Oral, Q24H, Vance Law MD, 320 mg at 03/23/22 0846  •  vancomycin 750 mg/250 mL 0.9% NS IVPB ([facility]), 750 mg, Intravenous, Q24H, Micheal Thompson MD, 750 mg at 03/22/22 2331     ASSESSMENT  Left lower lobe pneumonia aspiration with MRSA screen positive  Acute UTI  Sinus bradycardia  Diabetes mellitus  Hypertension  Hyperlipidemia peripheral artery disease s/p right AKA  Immobilization syndrome  Gastroesophageal reflux disease    PLAN  CPM  Continue IV antibiotics for 1 week  Continue to hold beta-blocker and Cardizem   Insulin dose adjusted  Adjust nursing home medications  Stress ulcer DVT prophylaxis  Cardiology consult appreciated  Infectious disease to follow patient   Supportive care  DNR  PT/OT  Discussed with family nursing staff  Discharge back to nursing home tomorrow after last dose of IV antibiotics    VANCE LAW MD

## 2022-03-23 NOTE — PROGRESS NOTES
"  Infectious Diseases Progress Note    Micheal Thompson MD     [facility]  Los: 7 days  Patient Identification:  Name: Gisela Gandara  Age: 89 y.o.  Sex: female  :  1932  MRN: [id number]         Primary Care Physician: Rigoberto Law MD            Subjective: No new complaints.  Interval History: See consultation note.    Objective:    Scheduled Meds:amLODIPine, 10 mg, Oral, Q24H  aspirin, 81 mg, Oral, Daily  cloNIDine, 0.2 mg, Oral, Q8H  FLUoxetine, 20 mg, Oral, Daily  gabapentin, 100 mg, Oral, Nightly  guaiFENesin, 400 mg, Oral, Q8H  hydrALAZINE, 25 mg, Oral, Q8H  insulin glargine, 5 Units, Subcutaneous, Nightly  insulin lispro, 0-7 Units, Subcutaneous, 4x Daily With Meals & Nightly  lansoprazole, 30 mg, Oral, BID AC  piperacillin-tazobactam, 3.375 g, Intravenous, Q8H  potassium chloride, 20 mEq, Oral, Daily  pravastatin, 10 mg, Oral, Nightly  valsartan, 320 mg, Oral, Q24H  vancomycin, 750 mg, Intravenous, Q24H      Continuous Infusions:Pharmacy to dose vancomycin,         Vital signs in last 24 hours:  Temp:  [98.5 °F (36.9 °C)-99.1 °F (37.3 °C)] 98.5 °F (36.9 °C)  Heart Rate:  [63-90] 74  Resp:  [18] 18  BP: (142-189)/(53-70) 189/69    Intake/Output:  No intake or output data in the 24 hours ending 22 1010    Exam:  BP (!) 189/69 (BP Location: Left arm, Patient Position: Lying)   Pulse 74   Temp 98.5 °F (36.9 °C) (Oral)   Resp 18   Ht 157.5 cm (62.01\")   Wt 69.3 kg (152 lb 12.5 oz)   SpO2 99%   BMI 27.94 kg/m²   Patient is examined using the personal protective equipment as per guidelines from infection control for this particular patient as enacted.  Hand washing was performed before and after patient interaction.  General Appearance:  Resting comfortably                          Head:    Normocephalic, without obvious abnormality, atraumatic                           Eyes:    PERRL, conjunctivae/corneas clear, EOM's intact, both eyes                         Throat:   Lips, " tongue, gums normal; oral mucosa pink and moist                           Neck:   Supple, symmetrical, trachea midline, no JVD                         Lungs:    Clear to auscultation bilaterally, respirations unlabored                 Chest Wall:    No tenderness or deformity                          Heart:  S1-S2 regular                  Abdomen:   Soft nontender                 Extremities: Right AKA and left foot great toe and second toe amputated                        Pulses:   Pulses palpable in all extremities                            Skin:   Skin is warm and dry,  no rashes or palpable lesions                  Neurologic: Grossly nonfocal       Data Review:    I reviewed the patient's new clinical results.  Results from last 7 days   Lab Units 03/23/22  0609 03/22/22  0317 03/21/22  0711 03/20/22  0845 03/19/22  0737 03/18/22  0541 03/16/22  1536   WBC 10*3/mm3 11.56* 11.84* 9.17 10.86* 12.97* 12.84* 17.54*   HEMOGLOBIN g/dL 10.1* 10.2* 10.1* 10.5* 10.2* 10.3* 13.6   PLATELETS 10*3/mm3 351 366 341 360 332 307 387     Results from last 7 days   Lab Units 03/23/22  0609 03/22/22  0317 03/21/22  0711 03/20/22  0845 03/19/22  0737 03/18/22  0541 03/17/22  1019   SODIUM mmol/L 142 141 140 139 136 135* 136   POTASSIUM mmol/L 3.2* 3.2* 3.6 4.7 3.1* 3.9 4.4   CHLORIDE mmol/L 111* 112* 111* 109* 102 101 101   CO2 mmol/L 19.9* 18.1* 20.0* 18.2* 21.0* 20.7* 23.0   BUN mg/dL 8 10 15 28* 37* 34* 27*   CREATININE mg/dL 0.73 0.69 0.79 1.26* 1.83* 1.83* 1.36*   CALCIUM mg/dL 8.2 8.1* 8.3 8.0* 7.9* 8.3 8.8   GLUCOSE mg/dL 107* 102* 78 71 58* 148* 203*       Microbiology Results (last 10 days)     Procedure Component Value - Date/Time    CANDIDA AURIS SCREEN - Swab, Axilla Right, Axilla Left and Groin [[id number]]  (Normal) Collected: 03/18/22 1752    Lab Status: Preliminary result Specimen: Swab from Axilla Right, Axilla Left and Groin Updated: 03/22/22 1903     Candida Auris Screen Culture No Candida auris isolated at 4  days    MRSA Screen, PCR (Inpatient) - Swab, Nares [[id number]]  (Abnormal) Collected: 03/17/22 1532    Lab Status: Final result Specimen: Swab from Nares Updated: 03/17/22 1644     MRSA PCR MRSA Detected    COVID PRE-OP / PRE-PROCEDURE SCREENING ORDER (NO ISOLATION) - Swab, Nasopharynx [[id number]]  (Normal) Collected: 03/16/22 1950    Lab Status: Final result Specimen: Swab from Nasopharynx Updated: 03/16/22 2030    Narrative:      The following orders were created for panel order COVID PRE-OP / PRE-PROCEDURE SCREENING ORDER (NO ISOLATION) - Swab, Nasopharynx.  Procedure                               Abnormality         Status                     ---------                               -----------         ------                     COVID-19,[facility]...[[id number]]  Normal              Final result                 Please view results for these tests on the individual orders.    COVID-19,[facility] CEPHEID/ELBERT NP SWAB IN TRANSPORT MEDIA 8-12 HR TAT - Swab, Nasopharynx [[id number]]  (Normal) Collected: 03/16/22 1950    Lab Status: Final result Specimen: Swab from Nasopharynx Updated: 03/16/22 2030     COVID19 Not Detected    Narrative:      Fact sheet for providers: https://www.fda.gov/media/586495/download    Fact sheet for patients: https://www.fda.gov/media/734139/download    Test performed by PCR.    Blood Culture - Blood, Arm, Left [[id number]]  (Normal) Collected: 03/16/22 1846    Lab Status: Final result Specimen: Blood from Arm, Left Updated: 03/21/22 1903     Blood Culture No growth at 5 days    Blood Culture - Blood, Arm, Left [[id number]]  (Normal) Collected: 03/16/22 1846    Lab Status: Final result Specimen: Blood from Arm, Left Updated: 03/21/22 1903     Blood Culture No growth at 5 days          Assessment:    Pneumonia of left lower lobe due to infectious organism  1-toxic metabolic encephalopathy secondary to  2-ongoing systemic infection with sepsis due to:              -Healthcare associated  pneumonia              -And urinary tract infection  3-peripheral vascular disease status post right AKA and recent amputation of the left second toe and prior history of amputation of left great toe.  4-diabetes mellitus  5-acute kidney injury on chronic kidney disease  6-positive MRSA screen  7-other diagnosis per primary team.        Recommendations/Discussions:  · Continue present treatment  · See my recommendations and discussion on 3/18/2022.  · Discussed with Dr. Banda.  Micheal Thompson MD  3/23/2022  10:10 EDT    Much of this encounter note is an electronic transcription/translation of spoken language to printed text. The electronic translation of spoken language may permit erroneous, or at times, nonsensical words or phrases to be inadvertently transcribed; Although I have reviewed the note for such errors, some may still exist

## 2022-03-23 NOTE — CASE MANAGEMENT/SOCIAL WORK
Continued Stay Note  [facility]     Patient Name: Gisela Gandara  MRN: [id number]  Today's Date: 3/23/2022    Admit Date: 3/16/2022     Discharge Plan     Row Name 03/23/22 1529       Plan    Plan Plan return to [facility].   NIC Srivastava RN    Patient/Family in Agreement with Plan yes    Plan Comments Per Kofi ( [phone]) pt will not need pre cert.  Pt will return under her Medicaid.  Plan return to [facility].   NIC Srivastava RN               Discharge Codes    No documentation.               Expected Discharge Date and Time     Expected Discharge Date Expected Discharge Time    Mar 23, 2022             Anny Srivastava, RN

## 2022-03-23 NOTE — CASE MANAGEMENT/SOCIAL WORK
Continued Stay Note  [facility]     Patient Name: Gisela Gandara  MRN: [id number]  Today's Date: 3/23/2022    Admit Date: 3/16/2022     Discharge Plan     Row Name 03/23/22 1158       Plan    Plan Plan return to [facility]- pending pre cert.   NIC Srivastava RN    Patient/Family in Agreement with Plan yes    Plan Comments Called Kofi  ( [phone]) and she states pre cert is still pending.  Plan return to [facility]- pre cert pending.   NIC Srivastava RN               Discharge Codes    No documentation.               Expected Discharge Date and Time     Expected Discharge Date Expected Discharge Time    Mar 23, 2022             Anny Srivastava RN

## 2022-03-23 NOTE — PROGRESS NOTES
"[facility]  Cardiology Progress Note    Patient Identification:  Name: Gisela Gandara  Age: 89 y.o.  Sex: female  :  1932  MRN: [id number]                 Follow Up / Chief Complaint: follow up on bradycardia    Interval History: Sinus rhythm on monitor with no events overnight.         Subjective:  Bradycardia better    Objective:    Past Medical History:  Past Medical History:   Diagnosis Date   • Arthritis    • Diabetes mellitus (HCC)    • Hyperlipidemia    • Hypertension      Past Surgical History:  History reviewed. No pertinent surgical history.     Social History:   Social History     Tobacco Use   • Smoking status: Former Smoker   • Smokeless tobacco: Former User   Substance Use Topics   • Alcohol use: Not on file      Family History:  History reviewed. No pertinent family history.       Allergies:  No Known Allergies  Scheduled Meds:  amLODIPine, 10 mg, Q24H  aspirin, 81 mg, Daily  cloNIDine, 0.2 mg, Q8H  FLUoxetine, 20 mg, Daily  gabapentin, 100 mg, Nightly  guaiFENesin, 400 mg, Q8H  hydrALAZINE, 25 mg, Q8H  insulin glargine, 5 Units, Nightly  insulin lispro, 0-7 Units, 4x Daily With Meals & Nightly  lansoprazole, 30 mg, BID AC  piperacillin-tazobactam, 3.375 g, Q8H  potassium chloride, 20 mEq, Daily  pravastatin, 10 mg, Nightly  valsartan, 320 mg, Q24H  vancomycin, 750 mg, Q24H            ROS  Constitutional: Awake and alert, no fever. No nosebleeds  Abdomen           no abdominal pain   Cardiac              no chest pain  Pulmonary          no shortness of breath      /77 (BP Location: Left arm, Patient Position: Lying)   Pulse 69   Temp 98.3 °F (36.8 °C) (Oral)   Resp 18   Ht 157.5 cm (62.01\")   Wt 69.3 kg (152 lb 12.5 oz)   SpO2 100%   BMI 27.94 kg/m²   General appearance: No acute changes   Neck: Trachea midline; NECK, supple, no thyromegaly or lymphadenopathy   Lungs: Normal size and shape, normal breath sounds, equal distribution of air, no rales or rhonchi "   CV: S1-S2 regular, no murmurs, no rub, no gallop   Abdomen: Soft, nontender; no masses , no abnormal abdominal sounds   Extremities: No deformity , normal color , no peripheral edema   Skin: Normal temperature, turgor and texture; no rash, ulcers            Cardiographics  Telemetry:   SR             SB with decrease HR compared to prior ecg       Telemetry:             Echocardiogram:     Interpretation Summary    · Estimated right ventricular systolic pressure from tricuspid regurgitation is mildly elevated (35-45 mmHg).  · Mild aortic valve stenosis is present.  · Calculated left ventricular EF = 78.5% Estimated left ventricular EF was in agreement with the calculated left ventricular EF.  · Left ventricular diastolic function is consistent with (grade I) impaired relaxation.  · The right atrial cavity is borderline dilated.  · There is no evidence of pericardial effusion. .       Imaging  Chest X-ray:   IMPRESSION:  1. Moderate left lower lobe pneumonia.  2. Hepatic and renal cysts.  3. Small bowel segments are at the upper limits of normal for diameter.  This may be related to mild ileus. It is unlikely to represent a  low-grade partial small bowel obstruction but if clinically indicated,  follow-up plain radiographs may be helpful.     Radiation dose reduction techniques were utilized, including automated  exposure control and exposure modulation based on body size.     This report was finalized on 3/16/2022 10:40 PM by Dr. Rush Millan M.D.        Lab Review   Results from last 7 days   Lab Units 03/22/22  0317   TROPONIN T ng/mL <0.010         Results from last 7 days   Lab Units 03/23/22  0609   SODIUM mmol/L 142   POTASSIUM mmol/L 3.2*   BUN mg/dL 8   CREATININE mg/dL 0.73   CALCIUM mg/dL 8.2        Results from last 7 days   Lab Units 03/23/22  0609 03/22/22  0317 03/21/22  0711   WBC 10*3/mm3 11.56* 11.84* 9.17   HEMOGLOBIN g/dL 10.1* 10.2* 10.1*   HEMATOCRIT % 31.1* 31.0* 31.7*   PLATELETS  "10*3/mm3 351 366 341         The following medical decision was discussed in detail with Dr. Martinez    Assessment:  Left lower lobe pneumonia aspiration with MRSA screen positive  Sinus bradycardia  Pauses: Pauses were less than 3  Diabetes mellitus  Hypertension  Hyperlipidemia  Immobilization  PAD status post right AKA  GERD  Acute UTI  Hypokalemia: replaced         Plan:  Blood pressure slightly elevated.  Sinus rhythm without any events overnight.  Continue to hold beta-blocker and Cardizem due to low heart rate.  Continue to monitor.  Recommend a ZIO at discharge.    Increase hydralazine      Mike Martinez MD    )3/23/2022       EMR Dragon/Transcription:   \"Dictated utilizing Dragon dictation\".     "

## 2022-03-23 NOTE — PLAN OF CARE
Goal Outcome Evaluation:  Plan of Care Reviewed With: patient           Outcome Evaluation: Patient alert and oriented x4. Denied pian nausea and SOA. NSR with BB on monitor. Turned q2. VSS. Nurtsing will continue to monitor.

## 2022-03-23 NOTE — SIGNIFICANT NOTE
03/23/22 0807   OTHER   Discipline physical therapist   Rehab Time/Intention   Session Not Performed other (see comments)  (Per notes, pt is a LTC resident and uses a tish lift at [facility]. Pt not appropriate for skilled PT at this time. PT will sign-off.)

## 2022-03-23 NOTE — PLAN OF CARE
Goal Outcome Evaluation:  Plan of Care Reviewed With: patient        Progress: improving  Outcome Evaluation: No complaints voiced, denies pain, VSS, NSR with BBB on monitor, Eyes closed at long interval, resting quietly in room, will continue to monitor

## 2022-03-23 NOTE — SIGNIFICANT NOTE
03/23/22 0814   OTHER   Discipline occupational therapist   Rehab Time/Intention   Session Not Performed other (see comments)  (Per chart, pt requires total A for ADLs and mobility using a tish lift. Not appropriate for OT services at this time, sign off)

## 2022-03-24 NOTE — PROGRESS NOTES
"  Infectious Diseases Progress Note    Micheal Thompson MD     [facility]  Los: 8 days  Patient Identification:  Name: Gisela Gandara  Age: 89 y.o.  Sex: female  :  1932  MRN: [id number]         Primary Care Physician: Rigoberto Law MD            Subjective: Feeling much better and continues to do well.  Interval History: See consultation note.    Objective:    Scheduled Meds:amLODIPine, 10 mg, Oral, Q24H  aspirin, 81 mg, Oral, Daily  cloNIDine, 0.2 mg, Oral, Q8H  FLUoxetine, 20 mg, Oral, Daily  gabapentin, 100 mg, Oral, Nightly  guaiFENesin, 400 mg, Oral, Q8H  hydrALAZINE, 50 mg, Oral, Q8H  lansoprazole, 30 mg, Oral, BID AC  potassium chloride, 20 mEq, Oral, BID With Meals  pravastatin, 10 mg, Oral, Nightly  valsartan, 320 mg, Oral, Q24H      Continuous Infusions:     Vital signs in last 24 hours:  Temp:  [98 °F (36.7 °C)-98.4 °F (36.9 °C)] 98 °F (36.7 °C)  Heart Rate:  [65-87] 80  Resp:  [18] 18  BP: (133-190)/() 141/57    Intake/Output:  No intake or output data in the 24 hours ending 22 1149    Exam:  /57 (BP Location: Left arm, Patient Position: Lying)   Pulse 80   Temp 98 °F (36.7 °C) (Oral)   Resp 18   Ht 157.5 cm (62.01\")   Wt 69.3 kg (152 lb 12.5 oz)   SpO2 100%   BMI 27.94 kg/m²   Patient is examined using the personal protective equipment as per guidelines from infection control for this particular patient as enacted.  Hand washing was performed before and after patient interaction.  General Appearance:  Resting comfortably                          Head:    Normocephalic, without obvious abnormality, atraumatic                           Eyes:    PERRL, conjunctivae/corneas clear, EOM's intact, both eyes                         Throat:   Lips, tongue, gums normal; oral mucosa pink and moist                           Neck:   Supple, symmetrical, trachea midline, no JVD                         Lungs:    Clear to auscultation bilaterally, respirations unlabored   "               Chest Wall:    No tenderness or deformity                          Heart:  S1-S2 regular                  Abdomen:   Soft nontender                 Extremities: Right AKA and left foot great toe and second toe amputated                        Pulses:   Pulses palpable in all extremities                            Skin:   Skin is warm and dry,  no rashes or palpable lesions                  Neurologic: Grossly nonfocal       Data Review:    I reviewed the patient's new clinical results.  Results from last 7 days   Lab Units 03/24/22  0602 03/23/22  0609 03/22/22  0317 03/21/22  0711 03/20/22  0845 03/19/22  0737 03/18/22  0541   WBC 10*3/mm3 10.24 11.56* 11.84* 9.17 10.86* 12.97* 12.84*   HEMOGLOBIN g/dL 10.9* 10.1* 10.2* 10.1* 10.5* 10.2* 10.3*   PLATELETS 10*3/mm3 398 351 366 341 360 332 307     Results from last 7 days   Lab Units 03/24/22  0602 03/23/22  0609 03/22/22  0317 03/21/22  0711 03/20/22  0845 03/19/22  0737 03/18/22  0541   SODIUM mmol/L 142 142 141 140 139 136 135*   POTASSIUM mmol/L 4.0 3.2* 3.2* 3.6 4.7 3.1* 3.9   CHLORIDE mmol/L 111* 111* 112* 111* 109* 102 101   CO2 mmol/L 21.2* 19.9* 18.1* 20.0* 18.2* 21.0* 20.7*   BUN mg/dL 6* 8 10 15 28* 37* 34*   CREATININE mg/dL 0.77 0.73 0.69 0.79 1.26* 1.83* 1.83*   CALCIUM mg/dL 8.4 8.2 8.1* 8.3 8.0* 7.9* 8.3   GLUCOSE mg/dL 68 107* 102* 78 71 58* 148*       Microbiology Results (last 10 days)     Procedure Component Value - Date/Time    CANDIDA AURIS SCREEN - Swab, Axilla Right, Axilla Left and Groin [[id number]]  (Normal) Collected: 03/18/22 3922    Lab Status: Final result Specimen: Swab from Axilla Right, Axilla Left and Groin Updated: 03/23/22 1150     Candida Auris Screen Culture No Candida auris isolated at 5 days    MRSA Screen, PCR (Inpatient) - Swab, Nares [[id number]]  (Abnormal) Collected: 03/17/22 1532    Lab Status: Final result Specimen: Swab from Nares Updated: 03/17/22 1644     MRSA PCR MRSA Detected    COVID PRE-OP /  PRE-PROCEDURE SCREENING ORDER (NO ISOLATION) - Swab, Nasopharynx [[id number]]  (Normal) Collected: 03/16/22 1950    Lab Status: Final result Specimen: Swab from Nasopharynx Updated: 03/16/22 2030    Narrative:      The following orders were created for panel order COVID PRE-OP / PRE-PROCEDURE SCREENING ORDER (NO ISOLATION) - Swab, Nasopharynx.  Procedure                               Abnormality         Status                     ---------                               -----------         ------                     COVID-19,[facility]...[[id number]]  Normal              Final result                 Please view results for these tests on the individual orders.    COVID-19,[facility] CEPHEID/ELBERT NP SWAB IN TRANSPORT MEDIA 8-12 HR TAT - Swab, Nasopharynx [[id number]]  (Normal) Collected: 03/16/22 1950    Lab Status: Final result Specimen: Swab from Nasopharynx Updated: 03/16/22 2030     COVID19 Not Detected    Narrative:      Fact sheet for providers: https://www.fda.gov/media/308570/download    Fact sheet for patients: https://www.fda.gov/media/195264/download    Test performed by PCR.    Blood Culture - Blood, Arm, Left [[id number]]  (Normal) Collected: 03/16/22 1846    Lab Status: Final result Specimen: Blood from Arm, Left Updated: 03/21/22 1903     Blood Culture No growth at 5 days    Blood Culture - Blood, Arm, Left [[id number]]  (Normal) Collected: 03/16/22 1846    Lab Status: Final result Specimen: Blood from Arm, Left Updated: 03/21/22 1903     Blood Culture No growth at 5 days          Assessment:    Pneumonia of left lower lobe due to infectious organism  1-toxic metabolic encephalopathy secondary to  2-ongoing systemic infection with sepsis due to:              -Healthcare associated pneumonia              -And urinary tract infection  3-peripheral vascular disease status post right AKA and recent amputation of the left second toe and prior history of amputation of left great toe.  4-diabetes  mellitus  5-acute kidney injury on chronic kidney disease  6-positive MRSA screen  7-other diagnosis per primary team.        Recommendations/Discussions:  · Has completed antibiotic treatment has significantly improved.  At discharge and recent surgery on her left foot.  · See my recommendations and discussion on 3/18/2022.  · Discussed with Dr. Banda.  Micheal Thompson MD  3/24/2022  11:49 EDT    Much of this encounter note is an electronic transcription/translation of spoken language to printed text. The electronic translation of spoken language may permit erroneous, or at times, nonsensical words or phrases to be inadvertently transcribed; Although I have reviewed the note for such errors, some may still exist

## 2022-03-24 NOTE — CASE MANAGEMENT/SOCIAL WORK
Continued Stay Note  [facility]     Patient Name: Gisela Gandara  MRN: [id number]  Today's Date: 3/24/2022    Admit Date: 3/16/2022     Discharge Plan     Row Name 03/24/22 0943       Plan    Plan Plan return to [facility]- IC level of care.   NIC Srivastava RN    Patient/Family in Agreement with Plan yes    Plan Comments Called and spoke with pt's daughter  (Pam Gandara [phone]) and notified her of pt's discharge.  Plan is for pt to return to [facility].  Per Kofi  ( [phone]) pt has a bed and can return today.  Discharge Summary to be printed and sent to facility per Kofi.  Discharge Summary in packet.  Packet to RN.  [facility] arranged to transport pt to [facility] at 1700.   Plan return to [facility]- IC level of care.   NIC Srivastava RN               Discharge Codes    No documentation.               Expected Discharge Date and Time     Expected Discharge Date Expected Discharge Time    Mar 24, 2022             Anny Srivastava RN

## 2022-03-24 NOTE — PROGRESS NOTES
"[facility]  Cardiology Progress Note    Patient Identification:  Name: Gisela Gandara  Age: 89 y.o.  Sex: female  :  1932  MRN: [id number]                 Follow Up / Chief Complaint: follow up on bradycardia    Interval History: Sinus rhythm on the monitor and no events overnight.  Will go home with a Holter monitor.  Holter monitor orders have been placed in the computer.     Subjective: Bradycardia has improved.  She denies chest pain or shortness of breath.      Objective:    Past Medical History:  Past Medical History:   Diagnosis Date   • Arthritis    • Diabetes mellitus (HCC)    • Hyperlipidemia    • Hypertension      Past Surgical History:  History reviewed. No pertinent surgical history.     Social History:   Social History     Tobacco Use   • Smoking status: Former Smoker   • Smokeless tobacco: Former User   Substance Use Topics   • Alcohol use: Not on file      Family History:  History reviewed. No pertinent family history.       Allergies:  No Known Allergies  Scheduled Meds:  amLODIPine, 10 mg, Q24H  aspirin, 81 mg, Daily  cloNIDine, 0.2 mg, Q8H  FLUoxetine, 20 mg, Daily  gabapentin, 100 mg, Nightly  guaiFENesin, 400 mg, Q8H  hydrALAZINE, 50 mg, Q8H  lansoprazole, 30 mg, BID AC  potassium chloride, 20 mEq, BID With Meals  pravastatin, 10 mg, Nightly  valsartan, 320 mg, Q24H            ROS  Constitutional: Awake and alert, no fever.  No nosebleeds  Abdomen           no abdominal pain   Cardiac              no chest pain  Pulmonary          no shortness of breath      /63 (BP Location: Left arm, Patient Position: Lying)   Pulse 80   Temp 97.7 °F (36.5 °C) (Oral)   Resp 18   Ht 157.5 cm (62.01\")   Wt 69.3 kg (152 lb 12.5 oz)   SpO2 100%   BMI 27.94 kg/m²         Physical Exam:  General:  Appears in no acute distress resting in bed  Eyes: EOM normal no conjunctival drainage  HEENT:  No JVD. Thyroid not visibly enlarged. No mucosal pallor or cyanosis  Respiratory: " Respirations regular and unlabored at rest. BBS with good air entry in all fields. No crackles, rubs or wheezes auscultated  Cardiovascular: S1S2 Regular rate and rhythm. No murmur, rub or gallop. No carotid bruits. DP/PT pulses  2+   . No pretibial pitting edema  Gastrointestinal: Abdomen soft, flat, non tender. Bowel sounds present. No hepatosplenomegaly. No ascites  Skin:   Skin warm and dry to touch. No rashes    Neuro: AAO x3 CN II-XII grossly intact  Psych: Mood and affect normal, pleasant and cooperative          I reviewed the patient's new clinical results, and personally reviewed and interpreted the patient's ECG and telemetry data from the last 24 hours            Cardiographics  Telemetry:   SR             SB with decrease HR compared to prior ecg       Telemetry:             Echocardiogram:     Interpretation Summary    · Estimated right ventricular systolic pressure from tricuspid regurgitation is mildly elevated (35-45 mmHg).  · Mild aortic valve stenosis is present.  · Calculated left ventricular EF = 78.5% Estimated left ventricular EF was in agreement with the calculated left ventricular EF.  · Left ventricular diastolic function is consistent with (grade I) impaired relaxation.  · The right atrial cavity is borderline dilated.  · There is no evidence of pericardial effusion. .       Imaging  Chest X-ray:   IMPRESSION:  1. Moderate left lower lobe pneumonia.  2. Hepatic and renal cysts.  3. Small bowel segments are at the upper limits of normal for diameter.  This may be related to mild ileus. It is unlikely to represent a  low-grade partial small bowel obstruction but if clinically indicated,  follow-up plain radiographs may be helpful.     Radiation dose reduction techniques were utilized, including automated  exposure control and exposure modulation based on body size.     This report was finalized on 3/16/2022 10:40 PM by Dr. Rush Millan M.D.        Lab Review   Results from last 7 days  "  Lab Units 03/22/22  0317   TROPONIN T ng/mL <0.010         Results from last 7 days   Lab Units 03/24/22  0602   SODIUM mmol/L 142   POTASSIUM mmol/L 4.0   BUN mg/dL 6*   CREATININE mg/dL 0.77   CALCIUM mg/dL 8.4        Results from last 7 days   Lab Units 03/24/22  0602 03/23/22  0609 03/22/22  0317   WBC 10*3/mm3 10.24 11.56* 11.84*   HEMOGLOBIN g/dL 10.9* 10.1* 10.2*   HEMATOCRIT % 33.7* 31.1* 31.0*   PLATELETS 10*3/mm3 398 351 366         The following medical decision was discussed in detail with Dr. Martinez    Assessment:  Left lower lobe pneumonia aspiration with MRSA screen positive  Sinus bradycardia  Pauses: Pauses were less than 3  Diabetes mellitus  Hypertension  Hyperlipidemia  Immobilization  PAD status post right AKA  GERD  Acute UTI  Hypokalemia: replaced         Plan:  Blood pressure and heart rate are stable today.  Continue current management.  Recommend a ZIO at discharge.  Ok to be discharged from our standpoint.     She will follow-up on May 4 at 1 PM with ROHIT Holder APRN    )3/24/2022       EMR Dragon/Transcription:   \"Dictated utilizing Dragon dictation\".     "

## 2022-03-24 NOTE — PLAN OF CARE
Goal Outcome Evaluation:  Plan of Care Reviewed With: patient        Progress: improving  Outcome Evaluation: No complaints voiced, denies pain, VSS, NSR on monitor, poss discharge this am, resting in room with eyes closed, will continue to monitor

## 2022-03-24 NOTE — PROGRESS NOTES
"Daily progress note    Chief complaint  Doing better  No new complaints  Denies fever cough shortness of breath    History of present illness  89-year-old -American male with history of peripheral artery disease right above-knee amputation diabetes mellitus hypertension hyperlipidemia chronic kidney disease stage III who is a nursing home resident and well-known to our service sent to the ER with altered mental status.  Patient also have difficulty eating but no nausea vomiting cough fever chest pain or increased shortness of breath.  Patient work-up in ER revealed acute UTI and left lower lobe pneumonia Healthcare associated admit for management.     REVIEW OF SYSTEMS  Unremarkable except generalized weakness     PHYSICAL EXAM   Blood pressure 141/57, pulse 80, temperature 98 °F (36.7 °C), temperature source Oral, resp. rate 18, height 157.5 cm (62.01\"), weight 69.3 kg (152 lb 12.5 oz), SpO2 100 %.    GENERAL: alert well developed, well nourished in no distress, obese.   HENT: NCAT, neck supple, trachea midline  EYES: no scleral icterus, PERRL, normal conjunctivae  CV: regular rhythm, regular rate, no murmur  RESPIRATORY: unlabored effort, CTAB  ABDOMEN: soft, nontender, nondistended, bowel sounds present  MUSCULOSKELETAL: no gross deformity. Right AKA. Stump intact. Left toe amputation incision site appears intact, midway down incision there is slight dehiscence but no active drainage or bleeding appreciated. Suture closure noted which appears intact. No erythema or warmth.   NEURO: alert,  sensory and motor function of extremities grossly intact, speech clear, mental status normal/baseline  SKIN: warm, dry, no rash  PSYCH:  Appropriate mood and affect     LAB RESULTS  Lab Results (last 24 hours)     Procedure Component Value Units Date/Time    Basic Metabolic Panel [[id number]]  (Abnormal) Collected: 03/24/22 0602    Specimen: Blood Updated: 03/24/22 0649     Glucose 68 mg/dL      BUN 6 mg/dL      " Creatinine 0.77 mg/dL      Sodium 142 mmol/L      Potassium 4.0 mmol/L      Chloride 111 mmol/L      CO2 21.2 mmol/L      Calcium 8.4 mg/dL      BUN/Creatinine Ratio 7.8     Anion Gap 9.8 mmol/L      eGFR 73.4 mL/min/1.73      Comment: National Kidney Foundation and American Society of Nephrology (ASN) Task Force recommended calculation based on the Chronic Kidney Disease Epidemiology Collaboration (CKD-EPI) equation refit without adjustment for race.       Narrative:      GFR Normal >60  Chronic Kidney Disease <60  Kidney Failure <15      CBC & Differential [[id number]]  (Abnormal) Collected: 03/24/22 0602    Specimen: Blood Updated: 03/24/22 0625    Narrative:      The following orders were created for panel order CBC & Differential.  Procedure                               Abnormality         Status                     ---------                               -----------         ------                     CBC Auto Differential[[id number]]        Abnormal            Final result                 Please view results for these tests on the individual orders.    CBC Auto Differential [[id number]]  (Abnormal) Collected: 03/24/22 0602    Specimen: Blood Updated: 03/24/22 0625     WBC 10.24 10*3/mm3      RBC 3.79 10*6/mm3      Hemoglobin 10.9 g/dL      Hematocrit 33.7 %      MCV 88.9 fL      MCH 28.8 pg      MCHC 32.3 g/dL      RDW 14.5 %      RDW-SD 46.8 fl      MPV 9.7 fL      Platelets 398 10*3/mm3      Neutrophil % 55.4 %      Lymphocyte % 29.8 %      Monocyte % 12.4 %      Eosinophil % 1.4 %      Basophil % 0.6 %      Immature Grans % 0.4 %      Neutrophils, Absolute 5.68 10*3/mm3      Lymphocytes, Absolute 3.05 10*3/mm3      Monocytes, Absolute 1.27 10*3/mm3      Eosinophils, Absolute 0.14 10*3/mm3      Basophils, Absolute 0.06 10*3/mm3      Immature Grans, Absolute 0.04 10*3/mm3      nRBC 0.0 /100 WBC     POC Glucose Once [[id number]]  (Normal) Collected: 03/24/22 0607    Specimen: Blood Updated: 03/24/22 0609      Glucose 115 mg/dL      Comment: Meter: [id number] : [id number] Janel Allen NA       POC Glucose Once [[id number]]  (Abnormal) Collected: 03/23/22 2023    Specimen: Blood Updated: 03/23/22 2024     Glucose 251 mg/dL      Comment: Meter: [id number] : [id number] Janel Allen NA       POC Glucose Once [[id number]]  (Abnormal) Collected: 03/23/22 1612    Specimen: Blood Updated: 03/23/22 1614     Glucose 189 mg/dL      Comment: Meter: [id number] : [id number] [facility] CNA       CANDIDA AURIS SCREEN - Swab, Axilla Right, Axilla Left and Groin [[id number]]  (Normal) Collected: 03/18/22 1752    Specimen: Swab from Axilla Right, Axilla Left and Groin Updated: 03/23/22 1150     Candida Auris Screen Culture No Candida auris isolated at 5 days    POC Glucose Once [[id number]]  (Abnormal) Collected: 03/23/22 1121    Specimen: Blood Updated: 03/23/22 1123     Glucose 186 mg/dL      Comment: Meter: [id number] : [id number] [facility]           Imaging Results (Last 24 Hours)     ** No results found for the last 24 hours. **        ECG 12 Lead  Component     QT Interval   ms 345              HEART RATE= 99  bpm  RR Interval= 608  ms  MI Interval= 174  ms  P Horizontal Axis= -2  deg  P Front Axis= 29  deg  QRSD Interval= 119  ms  QT Interval= 345  ms  QRS Axis= -69  deg  T Wave Axis= 110  deg  - ABNORMAL ECG -  Sinus rhythm  Probable left atrial enlargement  Nonspecific IVCD with LAD  Left ventricular hypertrophy  Inferior infarct, acute  Anterior infarct, old  Poor quality tracing repeat               Current Facility-Administered Medications:   •  acetaminophen (TYLENOL) tablet 500 mg, 500 mg, Oral, Q6H PRN, Rigoberto Law MD  •  amLODIPine (NORVASC) tablet 10 mg, 10 mg, Oral, Q24H, Yennifer Brandt APRN, 10 mg at 03/24/22 0824  •  aspirin chewable tablet 81 mg, 81 mg, Oral, Daily, Rigoberto Law MD, 81 mg at 03/24/22 0824  •  cloNIDine (CATAPRES) tablet 0.2 mg, 0.2 mg, Oral, Q8H, Rigoberto Law MD, 0.2 mg  at 03/24/22 0559  •  docusate sodium (COLACE) capsule 100 mg, 100 mg, Oral, Daily PRN, Rigoberto Law MD, 100 mg at 03/18/22 1744  •  FLUoxetine (PROzac) capsule 20 mg, 20 mg, Oral, Daily, Rigoberto Law MD, 20 mg at 03/24/22 0824  •  gabapentin (NEURONTIN) capsule 100 mg, 100 mg, Oral, Nightly, Rigoberto Law MD, 100 mg at 03/23/22 2117  •  guaifenesin (ROBITUSSIN) 100 MG/5ML liquid 200 mg, 200 mg, Oral, Q4H PRN, Rigoberto Law MD, 200 mg at 03/21/22 1607  •  guaiFENesin tablet 400 mg, 400 mg, Oral, Q8H, Rigoberto Law MD, 400 mg at 03/24/22 0559  •  hydrALAZINE (APRESOLINE) tablet 50 mg, 50 mg, Oral, Q8H, Yennifer Brandt APRN, 50 mg at 03/24/22 0559  •  HYDROcodone-acetaminophen (NORCO) 5-325 MG per tablet 1 tablet, 1 tablet, Oral, Q4H PRN, Rigoberto Law MD, 1 tablet at 03/19/22 2125  •  insulin glargine (LANTUS, SEMGLEE) injection 5 Units, 5 Units, Subcutaneous, Nightly, Rigoberto Law MD, 5 Units at 03/23/22 2118  •  insulin lispro (ADMELOG) injection 0-7 Units, 0-7 Units, Subcutaneous, 4x Daily With Meals & Nightly, Rigoberto Law MD, 4 Units at 03/23/22 2117  •  ipratropium-albuterol (DUO-NEB) nebulizer solution 3 mL, 3 mL, Nebulization, Q4H PRN, Rigoberto Law MD  •  lansoprazole (FIRST) oral suspension 30 mg, 30 mg, Oral, BID AC, Rigoberto Law MD, 30 mg at 03/24/22 0559  •  melatonin tablet 3 mg, 3 mg, Oral, Nightly PRN, Rigoberto Law MD  •  Pharmacy to dose vancomycin, , Does not apply, Continuous PRN, JayMicheal MD  •  piperacillin-tazobactam (ZOSYN) 3.375 g in iso-osmotic dextrose 50 ml (premix), 3.375 g, Intravenous, Q8H, Rigoberto Law MD, 3.375 g at 03/24/22 0235  •  potassium chloride (K-DUR,KLOR-CON) ER tablet 20 mEq, 20 mEq, Oral, BID With Meals, Rigoberto Law MD, 20 mEq at 03/24/22 0824  •  potassium chloride (K-DUR,KLOR-CON) ER tablet 40 mEq, 40 mEq, Oral, PRN, Rigoberto Law MD, 40 mEq at 03/23/22 1429  •  potassium chloride (KLOR-CON) packet 40 mEq, 40 mEq, Oral, PRN, Rigoberto Law MD, 40 mEq at  03/23/22 0846  •  pravastatin (PRAVACHOL) tablet 10 mg, 10 mg, Oral, Nightly, Vance Law MD, 10 mg at 03/23/22 2117  •  [COMPLETED] Insert peripheral IV, , , Once **AND** sodium chloride 0.9 % flush 10 mL, 10 mL, Intravenous, PRN, Gabriella Bradford, APRN, 10 mL at 03/19/22 2126  •  valsartan (DIOVAN) tablet 320 mg, 320 mg, Oral, Q24H, Vance Law MD, 320 mg at 03/24/22 0824  •  vancomycin 750 mg/250 mL 0.9% NS IVPB ([facility]), 750 mg, Intravenous, Q24H, Micheal Thompson MD, 750 mg at 03/23/22 0471     ASSESSMENT  Left lower lobe pneumonia completed antibiotics  Acute UTI resolved  Sinus bradycardia resolved  Diabetes mellitus  Hypertension  Hyperlipidemia   Peripheral artery disease s/p right AKA  Immobilization syndrome  Gastroesophageal reflux disease    PLAN  Discharge back to nursing home  Discharge summary dictated    VANCE LAW MD

## 2022-03-24 NOTE — DISCHARGE SUMMARY
Discharge summary    Date of admission 3/16/2022  Date of discharge 3/24/2022    Final diagnosis  Left lower lobe pneumonia  aspiration completed antibiotics  Acute UTI resolved  Sinus bradycardia resolved  Diabetes mellitus  Hypertension  Hyperlipidemia   Peripheral artery disease s/p right AKA  Immobilization syndrome  Gastroesophageal reflux disease    Discharge medications    Current Facility-Administered Medications:   •  amLODIPine (NORVASC) tablet 10 mg, 10 mg, Oral, Q24H, Yennifer Brandt APRN, 10 mg at 03/24/22 0824  •  aspirin chewable tablet 81 mg, 81 mg, Oral, Daily, Rigoberto Law MD, 81 mg at 03/24/22 0824  •  cloNIDine (CATAPRES) tablet 0.2 mg, 0.2 mg, Oral, Q8H, Rigoberto Law MD, 0.2 mg at 03/24/22 0559  •  FLUoxetine (PROzac) capsule 20 mg, 20 mg, Oral, Daily, Rigoberto Law MD, 20 mg at 03/24/22 0824  •  gabapentin (NEURONTIN) capsule 100 mg, 100 mg, Oral, Nightly, Rigoberto Law MD, 100 mg at 03/23/22 2117  •  guaiFENesin tablet 400 mg, 400 mg, Oral, Q8H, Rigoberto Law MD, 400 mg at 03/24/22 0559  •  hydrALAZINE (APRESOLINE) tablet 50 mg, 50 mg, Oral, Q8H, Yennifer Brandt APRN, 50 mg at 03/24/22 0559  •  lansoprazole (FIRST) oral suspension 30 mg, 30 mg, Oral, BID AC, Rigoberto Law MD, 30 mg at 03/24/22 0559  •  potassium chloride (K-DUR,KLOR-CON) ER tablet 20 mEq, 20 mEq, Oral, BID With Meals, Rigoberto Law MD, 20 mEq at 03/24/22 0824  •  pravastatin (PRAVACHOL) tablet 10 mg, 10 mg, Oral, Nightly, Rigoberto Law MD, 10 mg at 03/23/22 2117  •  [COMPLETED] Insert peripheral IV, , , Once **AND** sodium chloride 0.9 % flush 10 mL, 10 mL, Intravenous, PRN, Gabriella Bradford APRN, 10 mL at 03/19/22 2126  •  valsartan (DIOVAN) tablet 320 mg, 320 mg, Oral, Q24H, Rigoberto Law MD, 320 mg at 03/24/22 0824     Consults obtained  Cardiology  Infectious disease  Nutrition    Procedures  None    Hospital course  89-year-old -American female with multiple medical problems and well-known to our  service admitted to emergency room with altered mental status.  Patient work-up in ER revealed pneumonia UTI with sepsis admit for management.  Patient admitted treated with empiric antibiotics after obtaining the cultures and her MRSA screen was positive and infection recommend 7 days of Zosyn and vancomycin which he completed.  Patient also found to have bradycardia and she is taken off from beta-blocker and Cardizem and cardiology recommend no further work-up at this time and we will discharge on Zio patch.  Patient has no complaints tolerating soft diet and will be discharged back to long-term care.  Patient remained DNR throughout hospital course.    Discharge diet soft chopped nectar thick    Activity per PT OT    Medication as above    Further care per accepting physician at nursing home follow-up with cardiology per the instruction and take medication as directed    VANCE VILLEDA MD

## 2022-03-24 NOTE — PLAN OF CARE
Goal Outcome Evaluation:     Pt d/c back to [facility], report given to Pamela Milton.

## 2022-03-24 NOTE — CASE MANAGEMENT/SOCIAL WORK
"Physicians Statement of Medical Necessity for  Ambulance Transportation    GENERAL INFORMATION     Name: Gisela Gandara  YOB: 1932  Medicare #: ANTHEM M/JOSIE  [id number]  Transport Date: 3/24/2022  (Valid for round trips this date, or for scheduled repetitive trips for 60 days from the date signed below.)  Origin: [facility]               Destination: [facility]  Is the Patient's stay covered under Medicare Part A (PPS/DRG?)No   Closest appropriate facility? Yes  If this a hosp-hosp transfer? No  Is this a hospice patient? No    MEDICAL NECESSITY QUESTIONAIRE    Ambulance Transportation is medically necessary only if other means of transportation are contraindicated or would be potentially harmful to the patient.  To meet this requirement, the patient must be either \"bed confined\" or suffer from a condition such that transport by means other than an ambulance is contraindicated by the patient's condition.  The following questions must be answered by the healthcare professional signing below for this form to be valid:     1) Describe the MEDICAL CONDITION (physical and/or mental) of this patient AT THE TIME OF AMBULANCE TRANSPORT that requires the patient to be transported in an ambulance, and why transport by other means is contraindicated by the patient's condition: IMMOBILITY AND RT AKA  Past Medical History:   Diagnosis Date   • Arthritis    • Diabetes mellitus (HCC)    • Hyperlipidemia    • Hypertension       History reviewed. No pertinent surgical history.   2) Is this patient \"bed confined\" as defined below?Yes   To be \"bed confined\" the patient must satisfy all three of the following criteria:  (1) unable to get up from bed without assistance; AND (2) unable to ambulate;  AND (3) unable to sit in a chair or wheelchair.  3) Can this patient safely be transported by car or wheelchair van (I.e., may safely sit during transport, without an attendant or monitoring?)No   4. In " addition to completing questions 1-3 above, please check any of the following conditions that apply*:          *Note: supporting documentation for any boxes checked must be maintained in the patient's medical records Unable to tolerate seated position for time needed to transport      SIGNATURE OF PHYSICIAN OR OTHER AUTHORIZED HEALTHCARE PROFESSIONAL    I certify that the above information is true and correct based on my evaluation of this patient, and represent that the patient requires transport by ambulance and that other forms of transport are contraindicated.  I understand that this information will be used by the Centers for Medicare and Medicaid Services (CMS) to support the determiniation of medical necessity for ambulance services, and I represent that I have personal knowledge of the patient's condition at the time of transport.    XX   If this box is checked, I also certify that the patient is physically or mentally incapable of signing the ambulance service's claim form and that the institution with which I am affiliated has furnished care, services or assistance to the patient.  My signature below is made on behalf of the patient pursuant to 42 .36(b)(4). In accordance with 42 .37, the specific reason(s) that the patient is physically or mentally incapable of signing the claim for is as follows: METABOLIC ENCEPHALOPATHY    Signature of Physician or Healthcare Professional  Date/Time:   3/24/2022 @ 0930     (For Scheduled repetitive transport, this form is not valid for transports performed more than 60 days after this date).                   LUCIA GARZA RN                                                                                                                         --------------------------------------------------------------------------------------------  Printed Name and Credentials of Physician or Authorized Healthcare Professional     *Form must be signed by  patient's attending physician for scheduled, repetitive transports,.  For non-repetitive ambulance transports, if unable to obtain the signature of the attending physician, any of the following may sign (please select below):     Physician  Clinical Nurse Specialist X Registered Nurse     Physician Assistant XX Discharge Planner  Licensed Practical Nurse     Nurse Practitioner XX

## 2022-03-25 NOTE — CASE MANAGEMENT/SOCIAL WORK
Case Management Discharge Note      Final Note: Pt discharged to [facility]- IC level of care on 3/24.    NIC Srivastava RN         Selected Continued Care - Discharged on 3/24/2022 Admission date: 3/16/2022 - Discharge disposition: Skilled Nursing Facility (DC - External)    Destination Coordination complete.    Service Provider Selected Services Address Phone Fax Patient Preferred    [facility]  Intermediate [facility] [address] [id number] [phone] --          Durable Medical Equipment    No services have been selected for the patient.              Dialysis/Infusion    No services have been selected for the patient.              Home Medical Care    No services have been selected for the patient.              Therapy    No services have been selected for the patient.              Community Resources    No services have been selected for the patient.              Community & DME    No services have been selected for the patient.                  Transportation Services  Ambulance: [facility]    Final Discharge Disposition Code: 04 - intermediate care facility

## 2022-04-01 PROBLEM — N39.0 ACUTE UTI: Status: ACTIVE | Noted: 2022-01-01

## 2022-04-01 NOTE — ED TRIAGE NOTES
Pt reports nausea and states feels like something stuck in throat such as food bolus, no SOA or difficulty breathing, pt comes from [facility]. Glucose 252. Pt given 4mg IV zofran by EMS states feeling better.

## 2022-04-01 NOTE — ED PROVIDER NOTES
EMERGENCY DEPARTMENT ENCOUNTER    Room Number:  10/10  Date seen:  4/1/2022  PCP: Rigoberto Law MD  Historian: Patient      HPI:  Chief Complaint: Weakness, unable to eat  A complete HPI/ROS/PMH/PSH/SH/FH are unobtainable due to: Nothing  Context: Gsiela Gandara is a 89 y.o. female who presents to the ED c/o of generally not feeling well and complaining that she is having difficulty eating.  Patient reports that she was recently in the hospital.  She reports that she has not been able to really keep much of anything down because she starts gagging as soon as she tries to eat.  She denies fever or chills.  She denies any pain.  She currently denies shortness of breath.  She was recently treated for pneumonia.  She denies cough.  She is at [facility].  She does not know if she is on any special diet such as puréed foods or thickened liquids.  She has had some urinary frequency.            PAST MEDICAL HISTORY  Active Ambulatory Problems     Diagnosis Date Noted   • Diabetic ketoacidosis without coma associated with type 2 diabetes mellitus (HCC) 11/18/2020   • Pneumonia of left lower lobe due to infectious organism 03/16/2022     Resolved Ambulatory Problems     Diagnosis Date Noted   • No Resolved Ambulatory Problems     Past Medical History:   Diagnosis Date   • Arthritis    • Diabetes mellitus (HCC)    • Hyperlipidemia    • Hypertension          PAST SURGICAL HISTORY  No past surgical history on file.      FAMILY HISTORY  No family history on file.      SOCIAL HISTORY  Social History     Socioeconomic History   • Marital status:    Tobacco Use   • Smoking status: Former Smoker   • Smokeless tobacco: Former User         ALLERGIES  Patient has no known allergies.        REVIEW OF SYSTEMS  Review of Systems   Review of all 14 systems is negative other than stated in the HPI above.      PHYSICAL EXAM  ED Triage Vitals [04/01/22 1439]   Temp Heart Rate Resp BP SpO2   98.3 °F (36.8 °C) 110  22 (!) 186/79 98 %      Temp src Heart Rate Source Patient Position BP Location FiO2 (%)   -- -- -- -- --         GENERAL: Awake and alert, no acute distress  HENT: nares patent, oropharynx clear, dentures present  EYES: no scleral icterus, EOMI  CV: regular rhythm, normal rate, no murmur appreciated  RESPIRATORY: normal effort, lungs clear auscultation bilaterally  ABDOMEN: soft, nondistended, nontender throughout  MUSCULOSKELETAL: Status post right AKA, left lower extremity is normal to inspection  NEURO: alert, moves all extremities, follows commands  PSYCH:  calm, cooperative  SKIN: warm, dry    Vital signs and nursing notes reviewed.          LAB RESULTS  Recent Results (from the past 24 hour(s))   Comprehensive Metabolic Panel    Collection Time: 04/01/22  3:03 PM    Specimen: Blood   Result Value Ref Range    Glucose 214 (H) 65 - 99 mg/dL    BUN 11 8 - 23 mg/dL    Creatinine 0.93 0.57 - 1.00 mg/dL    Sodium 138 136 - 145 mmol/L    Potassium 4.1 3.5 - 5.2 mmol/L    Chloride 105 98 - 107 mmol/L    CO2 17.0 (L) 22.0 - 29.0 mmol/L    Calcium 8.4 8.2 - 9.6 mg/dL    Total Protein 6.3 6.0 - 8.5 g/dL    Albumin 2.70 (L) 3.50 - 5.20 g/dL    ALT (SGPT) 30 1 - 33 U/L    AST (SGOT) 28 1 - 32 U/L    Alkaline Phosphatase 218 (H) 39 - 117 U/L    Total Bilirubin 0.4 0.0 - 1.2 mg/dL    Globulin 3.6 gm/dL    A/G Ratio 0.8 g/dL    BUN/Creatinine Ratio 11.8 7.0 - 25.0    Anion Gap 16.0 (H) 5.0 - 15.0 mmol/L    eGFR 58.5 (L) >60.0 mL/min/1.73   Lipase    Collection Time: 04/01/22  3:03 PM    Specimen: Blood   Result Value Ref Range    Lipase 54 13 - 60 U/L   CBC Auto Differential    Collection Time: 04/01/22  3:03 PM    Specimen: Blood   Result Value Ref Range    WBC 12.89 (H) 3.40 - 10.80 10*3/mm3    RBC 3.76 (L) 3.77 - 5.28 10*6/mm3    Hemoglobin 10.9 (L) 12.0 - 15.9 g/dL    Hematocrit 33.3 (L) 34.0 - 46.6 %    MCV 88.6 79.0 - 97.0 fL    MCH 29.0 26.6 - 33.0 pg    MCHC 32.7 31.5 - 35.7 g/dL    RDW 14.8 12.3 - 15.4 %    RDW-SD  48.0 37.0 - 54.0 fl    MPV 10.1 6.0 - 12.0 fL    Platelets 473 (H) 140 - 450 10*3/mm3    Neutrophil % 73.7 42.7 - 76.0 %    Lymphocyte % 17.8 (L) 19.6 - 45.3 %    Monocyte % 7.8 5.0 - 12.0 %    Eosinophil % 0.0 (L) 0.3 - 6.2 %    Basophil % 0.3 0.0 - 1.5 %    Immature Grans % 0.4 0.0 - 0.5 %    Neutrophils, Absolute 9.50 (H) 1.70 - 7.00 10*3/mm3    Lymphocytes, Absolute 2.30 0.70 - 3.10 10*3/mm3    Monocytes, Absolute 1.00 (H) 0.10 - 0.90 10*3/mm3    Eosinophils, Absolute 0.00 0.00 - 0.40 10*3/mm3    Basophils, Absolute 0.04 0.00 - 0.20 10*3/mm3    Immature Grans, Absolute 0.05 0.00 - 0.05 10*3/mm3    nRBC 0.0 0.0 - 0.2 /100 WBC   Urinalysis With Microscopic If Indicated (No Culture) - Urine, Catheter    Collection Time: 04/01/22  3:26 PM    Specimen: Urine, Catheter   Result Value Ref Range    Color, UA Yellow Yellow, Straw    Appearance, UA Turbid (A) Clear    pH, UA <=5.0 5.0 - 8.0    Specific Gravity, UA 1.013 1.005 - 1.030    Glucose, UA Negative Negative    Ketones, UA Negative Negative    Bilirubin, UA Negative Negative    Blood, UA Trace (A) Negative    Protein,  mg/dL (2+) (A) Negative    Leuk Esterase, UA Large (3+) (A) Negative    Nitrite, UA Negative Negative    Urobilinogen, UA 0.2 E.U./dL 0.2 - 1.0 E.U./dL   Urinalysis, Microscopic Only - Urine, Catheter    Collection Time: 04/01/22  3:26 PM    Specimen: Urine, Catheter   Result Value Ref Range    RBC, UA 6-12 (A) None Seen, 0-2 /HPF    WBC, UA Too Numerous to Count (A) None Seen, 0-2 /HPF    Bacteria, UA 1+ (A) None Seen /HPF    Squamous Epithelial Cells, UA 0-2 None Seen, 0-2 /HPF    Yeast, UA Moderate/2+ Yeast None Seen /HPF    Hyaline Casts, UA 3-6 None Seen /LPF    Methodology Manual Light Microscopy        Ordered the above labs and reviewed the results.        RADIOLOGY  No Radiology Exams Resulted Within Past 24 Hours    Ordered the above noted radiological studies. Reviewed by me in PACS.             PROCEDURES  Procedures              MEDICATIONS GIVEN IN ER  Medications   sodium chloride 0.9 % flush 10 mL (has no administration in time range)   ondansetron (ZOFRAN) injection 4 mg (has no administration in time range)   cefTRIAXone (ROCEPHIN) in SWFI 1 gram/10ml IV PUSH syringe (1 g Intravenous Given 4/1/22 1616)                   MEDICAL DECISION MAKING, PROGRESS, and CONSULTS    All labs have been independently reviewed by me.  All radiology studies have been reviewed by me and discussed with radiologist dictating the report.   EKG's independently viewed and interpreted by me.  Discussion below represents my analysis of pertinent findings related to patient's condition, differential diagnosis, treatment plan and final disposition.      Differential diagnosis includes but is not limited to:  Pancreatitis  Gastroparesis  Esophageal dysphagia  Sepsis  Small bowel obstruction    ED Course as of 04/01/22 1621   Fri Apr 01, 2022   1534 Medical record review: I reviewed discharge summary from 3/24/2022 where patient was admitted and treated for a left lower lobe pneumonia felt to be aspiration.  She was treated with Zosyn but vancomycin was added due to a positive MRSA screen.  There is also suspected urinary tract infection.  She had some bradycardia during the admission and her AV henrietta blocking agents were stopped.  Patient was reportedly tolerating a soft diet at discharge to long-term care. [JR]   1602 WBC, UA(!): Too Numerous to Count [JR]   1602 WBC(!): 12.89 [JR]   1602 AST (SGOT): 28 [JR]   1602 ALT (SGPT): 30 [JR]   1602 Alkaline Phosphatase(!): 218 [JR]   1602 WBC, UA(!): Too Numerous to Count [JR]   1602 Bacteria, UA(!): 1+ [JR]   1620 Discussed with Dr. Law who agrees to admit for further management. [JR]      ED Course User Index  [JR] Dominick Leiva MD              I wore an N95 mask, face shield, and gloves during this patient encounter.  Patient also wearing a surgical mask.   Hand hygeine performed before and after seeing the patient.    DIAGNOSIS  Final diagnoses:   Acute UTI   Dysphagia, unspecified type   Generalized weakness         DISPOSITION  ADMIT            Latest Documented Vital Signs:  As of 16:21 EDT  BP- (!) 186/79 HR- 110 Temp- 98.3 °F (36.8 °C) O2 sat- 98%        --    Please note that portions of this were completed with a voice recognition program.          Dominick Leiva MD  04/01/22 6582

## 2022-04-01 NOTE — ED NOTES
Nursing report ED to floor  Gisela Gandara  89 y.o.  female    HPI :   Chief Complaint   Patient presents with   • Nausea       Admitting doctor:   Rigoberto Law MD    Admitting diagnosis:   The primary encounter diagnosis was Acute UTI. Diagnoses of Dysphagia, unspecified type and Generalized weakness were also pertinent to this visit.    Code status:   Current Code Status     Date Active Code Status Order ID Comments User Context       Prior    Advance Care Planning Activity          Allergies:   Patient has no known allergies.    Intake and Output    Intake/Output Summary (Last 24 hours) at 4/1/2022 1745  Last data filed at 4/1/2022 1500  Gross per 24 hour   Intake 250 ml   Output 360 ml   Net -110 ml       Weight:   There were no vitals filed for this visit.    Most recent vitals:   Vitals:    04/01/22 1439   BP: (!) 186/79   Pulse: 110   Resp: 22   Temp: 98.3 °F (36.8 °C)   SpO2: 98%       Active LDAs/IV Access:   Lines, Drains & Airways     Active LDAs     Name Placement date Placement time Site Days    Peripheral IV 04/01/22 Left Antecubital 04/01/22  --  Antecubital  less than 1                Labs (abnormal labs have a star):   Labs Reviewed   COMPREHENSIVE METABOLIC PANEL - Abnormal; Notable for the following components:       Result Value    Glucose 214 (*)     CO2 17.0 (*)     Albumin 2.70 (*)     Alkaline Phosphatase 218 (*)     Anion Gap 16.0 (*)     eGFR 58.5 (*)     All other components within normal limits    Narrative:     GFR Normal >60  Chronic Kidney Disease <60  Kidney Failure <15     URINALYSIS W/ MICROSCOPIC IF INDICATED (NO CULTURE) - Abnormal; Notable for the following components:    Appearance, UA Turbid (*)     Blood, UA Trace (*)     Protein,  mg/dL (2+) (*)     Leuk Esterase, UA Large (3+) (*)     All other components within normal limits   CBC WITH AUTO DIFFERENTIAL - Abnormal; Notable for the following components:    WBC 12.89 (*)     RBC 3.76 (*)     Hemoglobin 10.9 (*)      Hematocrit 33.3 (*)     Platelets 473 (*)     Lymphocyte % 17.8 (*)     Eosinophil % 0.0 (*)     Neutrophils, Absolute 9.50 (*)     Monocytes, Absolute 1.00 (*)     All other components within normal limits   URINALYSIS, MICROSCOPIC ONLY - Abnormal; Notable for the following components:    RBC, UA 6-12 (*)     WBC, UA Too Numerous to Count (*)     Bacteria, UA 1+ (*)     All other components within normal limits   COVID-19,[facility] CEPHEID/ELBERT, NP SWAB IN TRANSPORT MEDIA 8-12 HR TAT - Normal    Narrative:     Fact sheet for providers: https://www.fda.gov/media/543661/download    Fact sheet for patients: https://www.fda.gov/media/469739/download    Test performed by PCR.   LIPASE - Normal   COVID PRE-OP / PRE-PROCEDURE SCREENING ORDER (NO ISOLATION)    Narrative:     The following orders were created for panel order COVID PRE-OP / PRE-PROCEDURE SCREENING ORDER (NO ISOLATION) - Swab, Nasopharynx.  Procedure                               Abnormality         Status                     ---------                               -----------         ------                     COVID-19,[facility]...[[id number]]  Normal              Final result                 Please view results for these tests on the individual orders.   URINE CULTURE   CBC AND DIFFERENTIAL    Narrative:     The following orders were created for panel order CBC & Differential.  Procedure                               Abnormality         Status                     ---------                               -----------         ------                     CBC Auto Differential[[id number]]        Abnormal            Final result                 Please view results for these tests on the individual orders.       EKG:   No orders to display       Meds given in ED:   Medications   sodium chloride 0.9 % flush 10 mL (has no administration in time range)   ondansetron (ZOFRAN) injection 4 mg (0 mg Intravenous Hold 4/1/22 1622)   cefTRIAXone (ROCEPHIN) in SWFI 1  gram/10ml IV PUSH syringe (1 g Intravenous Given 4/1/22 3553)       Imaging results:  No radiology results for the last day    Ambulatory status:   - BR    Social issues:   Social History     Socioeconomic History   • Marital status:    Tobacco Use   • Smoking status: Former Smoker   • Smokeless tobacco: Former User       NIH Stroke Scale:        Nursing report ED to floor:

## 2022-04-01 NOTE — H&P
History and physical    Primary care physician  Dr. Law    Chief complaint  Choking episode  Unable to eat  Generalized weakness    History of present illness  89-year-old -American female who is well-known to our service with history of peripheral artery disease status post right AKA who also have diabetes mellitus hypertension hyperlipidemia gastroesophageal reflux disease who has been immobilized and was recently treated for UTI pneumonia and discharged back to nursing home in stable condition sent to the ER that she has had a choking episode and unable to eat with generalized weakness.  Patient evaluated in ER admit for further work-up.  Patient fully alert oriented denies any fever cough chest pain increase shortness of breath abdominal pain nausea vomiting diarrhea.    PAST MEDICAL HISTORY  • Peripheral artery disease     • Diabetes mellitus      • Hyperlipidemia     • Hypertension        PAST SURGICAL HISTORY     No past surgical history on file.     FAMILY HISTORY  No family history on file.     SOCIAL HISTORY                Socioeconomic History   • Marital status:    Tobacco Use   • Smoking status: Former Smoker   • Smokeless tobacco: Former User         ALLERGIES  Patient has no known allergies.  Home medications reviewed     REVIEW OF SYSTEMS  Review of all 14 systems is negative other than stated in the HPI above.     PHYSICAL EXAM  Blood pressure (!) 186/79, pulse 110, temperature 98.3 °F (36.8 °C), resp. rate 22, SpO2 98 %.    GENERAL: Awake and alert, no acute distress  HENT: nares patent, oropharynx clear, dentures present  EYES: no scleral icterus, EOMI  CV: regular rhythm, normal rate, no murmur appreciated  RESPIRATORY: normal effort, lungs clear auscultation bilaterally  ABDOMEN: soft, nondistended, nontender throughout  MUSCULOSKELETAL: Status post right AKA, left lower extremity is normal to inspection  NEURO: alert, moves all extremities, follows commands  PSYCH:  calm,  cooperative  SKIN: warm, dry     LAB RESULTS   Lab Results (last 24 hours)     Procedure Component Value Units Date/Time    COVID PRE-OP / PRE-PROCEDURE SCREENING ORDER (NO ISOLATION) - Swab, Nasopharynx [[id number]]  (Normal) Collected: 04/01/22 1618    Specimen: Swab from Nasopharynx Updated: 04/01/22 1704    Narrative:      The following orders were created for panel order COVID PRE-OP / PRE-PROCEDURE SCREENING ORDER (NO ISOLATION) - Swab, Nasopharynx.  Procedure                               Abnormality         Status                     ---------                               -----------         ------                     COVID-19,[facility]...[[id number]]  Normal              Final result                 Please view results for these tests on the individual orders.    COVID-19,[facility]HE[facility]/ELBERT NP SWAB IN TRANSPORT MEDIA 8-12 HR TAT - Swab, Nasopharynx [[id number]]  (Normal) Collected: 04/01/22 1618    Specimen: Swab from Nasopharynx Updated: 04/01/22 1704     COVID19 Not Detected    Narrative:      Fact sheet for providers: https://www.fda.gov/media/122220/download    Fact sheet for patients: https://www.fda.gov/media/535309/download    Test performed by PCR.    Urine Culture - Urine, Urine, Catheter [[id number]] Collected: 04/01/22 1526    Specimen: Urine, Catheter Updated: 04/01/22 1613    Urinalysis With Microscopic If Indicated (No Culture) - Urine, Catheter [[id number]]  (Abnormal) Collected: 04/01/22 1526    Specimen: Urine, Catheter Updated: 04/01/22 1559     Color, UA Yellow     Appearance, UA Turbid     pH, UA <=5.0     Specific Gravity, UA 1.013     Glucose, UA Negative     Ketones, UA Negative     Bilirubin, UA Negative     Blood, UA Trace     Protein,  mg/dL (2+)     Leuk Esterase, UA Large (3+)     Nitrite, UA Negative     Urobilinogen, UA 0.2 E.U./dL    Urinalysis, Microscopic Only - Urine, Catheter [[id number]]  (Abnormal) Collected: 04/01/22 1526    Specimen: Urine, Catheter  Updated: 04/01/22 1559     RBC, UA 6-12 /HPF      WBC, UA Too Numerous to Count /HPF      Bacteria, UA 1+ /HPF      Squamous Epithelial Cells, UA 0-2 /HPF      Yeast, UA Moderate/2+ Yeast /HPF      Hyaline Casts, UA 3-6 /LPF      Methodology Manual Light Microscopy    Comprehensive Metabolic Panel [[id number]]  (Abnormal) Collected: 04/01/22 1503    Specimen: Blood Updated: 04/01/22 1540     Glucose 214 mg/dL      BUN 11 mg/dL      Creatinine 0.93 mg/dL      Sodium 138 mmol/L      Potassium 4.1 mmol/L      Chloride 105 mmol/L      CO2 17.0 mmol/L      Calcium 8.4 mg/dL      Total Protein 6.3 g/dL      Albumin 2.70 g/dL      ALT (SGPT) 30 U/L      AST (SGOT) 28 U/L      Alkaline Phosphatase 218 U/L      Total Bilirubin 0.4 mg/dL      Globulin 3.6 gm/dL      A/G Ratio 0.8 g/dL      BUN/Creatinine Ratio 11.8     Anion Gap 16.0 mmol/L      eGFR 58.5 mL/min/1.73      Comment: National Kidney Foundation and American Society of Nephrology (ASN) Task Force recommended calculation based on the Chronic Kidney Disease Epidemiology Collaboration (CKD-EPI) equation refit without adjustment for race.       Narrative:      GFR Normal >60  Chronic Kidney Disease <60  Kidney Failure <15      Lipase [[id number]]  (Normal) Collected: 04/01/22 1503    Specimen: Blood Updated: 04/01/22 1540     Lipase 54 U/L     CBC & Differential [[id number]]  (Abnormal) Collected: 04/01/22 1503    Specimen: Blood Updated: 04/01/22 1519    Narrative:      The following orders were created for panel order CBC & Differential.  Procedure                               Abnormality         Status                     ---------                               -----------         ------                     CBC Auto Differential[[id number]]        Abnormal            Final result                 Please view results for these tests on the individual orders.    CBC Auto Differential [[id number]]  (Abnormal) Collected: 04/01/22 1503    Specimen: Blood Updated: 04/01/22  1519     WBC 12.89 10*3/mm3      RBC 3.76 10*6/mm3      Hemoglobin 10.9 g/dL      Hematocrit 33.3 %      MCV 88.6 fL      MCH 29.0 pg      MCHC 32.7 g/dL      RDW 14.8 %      RDW-SD 48.0 fl      MPV 10.1 fL      Platelets 473 10*3/mm3      Neutrophil % 73.7 %      Lymphocyte % 17.8 %      Monocyte % 7.8 %      Eosinophil % 0.0 %      Basophil % 0.3 %      Immature Grans % 0.4 %      Neutrophils, Absolute 9.50 10*3/mm3      Lymphocytes, Absolute 2.30 10*3/mm3      Monocytes, Absolute 1.00 10*3/mm3      Eosinophils, Absolute 0.00 10*3/mm3      Basophils, Absolute 0.04 10*3/mm3      Immature Grans, Absolute 0.05 10*3/mm3      nRBC 0.0 /100 WBC         Imaging Results (Last 24 Hours)     ** No results found for the last 24 hours. **          Current Facility-Administered Medications:   •  amLODIPine (NORVASC) tablet 10 mg, 10 mg, Oral, Q24H, Rigoberto Law MD  •  aspirin EC tablet 81 mg, 81 mg, Oral, Daily, Rigoberto Law MD  •  cefTRIAXone (ROCEPHIN) in SWFI 1 gram/10ml IV PUSH syringe, 1 g, Intravenous, Q24H, Rigoberto Law MD  •  cloNIDine (CATAPRES) tablet 0.2 mg, 0.2 mg, Oral, Q8H, Rigoberto Law MD  •  FLUoxetine (PROzac) capsule 20 mg, 20 mg, Oral, Daily, Rigoberto Law MD  •  gabapentin (NEURONTIN) capsule 100 mg, 100 mg, Oral, Nightly, Rigoberto Law MD  •  hydrALAZINE (APRESOLINE) tablet 100 mg, 100 mg, Oral, Q8H, Rigoberto Law MD  •  insulin lispro (ADMELOG) injection 0-7 Units, 0-7 Units, Subcutaneous, 4x Daily With Meals & Nightly, Rigoberto Law MD  •  melatonin tablet 3 mg, 3 mg, Oral, Nightly PRN, Rigoberto Law MD  •  pantoprazole (PROTONIX) EC tablet 40 mg, 40 mg, Oral, BID AC, Rigoberto Law MD  •  potassium chloride (K-DUR,KLOR-CON) ER tablet 20 mEq, 20 mEq, Oral, BID With Meals, Rigoberto Law MD  •  pravastatin (PRAVACHOL) tablet 10 mg, 10 mg, Oral, Nightly, Rigoberto Law MD  •  [COMPLETED] Insert peripheral IV, , , Once **AND** sodium chloride 0.9 % flush 10 mL, 10 mL, Intravenous, PRN, EliaWaldo gonzalez  MD  •  valsartan (DIOVAN) tablet 320 mg, 320 mg, Oral, Q24H, Rigoberto Law MD    Current Outpatient Medications:   •  acetaminophen (TYLENOL) 500 MG tablet, Take 500 mg by mouth Every 6 (Six) Hours As Needed for Mild Pain ., Disp: , Rfl:   •  amLODIPine (NORVASC) 10 MG tablet, Take 1 tablet by mouth Daily for 30 days., Disp: 30 tablet, Rfl: 0  •  aspirin 81 MG EC tablet, Take 81 mg by mouth Daily., Disp: , Rfl:   •  cloNIDine (CATAPRES) 0.2 MG tablet, Take 1 tablet by mouth Every 8 (Eight) Hours for 30 days., Disp: 90 tablet, Rfl: 0  •  FLUoxetine (PROzac) 20 MG capsule, Take 20 mg by mouth Daily., Disp: , Rfl:   •  gabapentin (NEURONTIN) 100 MG capsule, Take 100 mg by mouth Every Night., Disp: , Rfl:   •  guaiFENesin (ROBITUSSIN) 100 MG/5ML solution oral solution, Take 200 mg by mouth Every 4 (Four) Hours As Needed., Disp: , Rfl:   •  hydrALAZINE (APRESOLINE) 50 MG tablet, Take 1 tablet by mouth Every 8 (Eight) Hours for 30 days., Disp: 90 tablet, Rfl: 0  •  insulin lispro (humaLOG) 100 UNIT/ML injection, Inject 0-24 Units under the skin into the appropriate area as directed 4 (Four) Times a Day With Meals & at Bedtime., Disp: , Rfl: 12  •  melatonin 3 MG tablet, Take 3 mg by mouth Every Night., Disp: , Rfl:   •  omeprazole (priLOSEC) 20 MG capsule, Take 20 mg by mouth 2 (Two) Times a Day., Disp: , Rfl:   •  potassium chloride (K-DUR,KLOR-CON) 20 MEQ CR tablet, Take 1 tablet by mouth 2 (Two) Times a Day With Meals for 30 days., Disp: 60 tablet, Rfl: 0  •  pravastatin (PRAVACHOL) 10 MG tablet, Take 1 tablet by mouth Every Night for 30 days., Disp: 30 tablet, Rfl: 0  •  valsartan (DIOVAN) 320 MG tablet, Take 1 tablet by mouth Daily for 30 days., Disp: 30 tablet, Rfl: 0     ASSESSMENT  Dysphagia  Persistent nausea vomiting  Acute UTI  Recent aspiration pneumonia  Diabetes mellitus  Hypertension  Hyperlipidemia  Peripheral artery disease status post right AKA  Immobilization syndrome  Gastroesophageal reflux  disease    PLAN  Admit  Clear liquid diet  Swallow eval  Empiric antibiotics for UTI  GI consult per patient and family request  Continue nursing home medications  Stress ulcer DVT prophylaxis  Supportive care  DNR  Discussed with family staff  Follow closely further recommendation current hospital course    VANCE VILLEDA MD

## 2022-04-01 NOTE — ED NOTES
Pt family member at bedside. States concern that patient has had continued swallowing difficulty for months and feels that UTI is partially related to poor fluid intake 2/2 above. Family member states that patient has an upper GI scope scheduled for 2 weeks from now but that she would greatly prefer it be done while patient is in hospital this admission.

## 2022-04-02 NOTE — THERAPY EVALUATION
Acute Care - Speech Language Pathology   Swallow Initial Evaluation [facility]     Patient Name: Gisela Gandara  : 1932  MRN: [id number]  Today's Date: 2022               Admit Date: 2022    Visit Dx:     ICD-10-CM ICD-9-CM   1. Acute UTI  N39.0 599.0   2. Dysphagia, unspecified type  R13.10 787.20   3. Generalized weakness  R53.1 780.79     Patient Active Problem List   Diagnosis   • Diabetic ketoacidosis without coma associated with type 2 diabetes mellitus (HCC)   • Pneumonia of left lower lobe due to infectious organism   • Acute UTI     Past Medical History:   Diagnosis Date   • Arthritis    • Diabetes mellitus (HCC)    • Hyperlipidemia    • Hypertension      No past surgical history on file.    SLP Recommendation and Plan  SLP Swallowing Diagnosis: R/O pharyngeal dysphagia, esophageal dysphagia (22)  SLP Diet Recommendation: soft textures, ground, thin liquids (22)  Recommended Precautions and Strategies: upright posture during/after eating, small bites of food and sips of liquid (22)  SLP Rec. for Method of Medication Administration: meds whole, as tolerated (22)     Monitor for Signs of Aspiration: yes, notify SLP if any concerns (22)  Recommended Diagnostics[place] [place]  Swallow Criteria for Skilled Therapeutic Interventions Met: demonstrates skilled criteria (22)  Anticipated Discharge Disposition (SLP): unknown (22)  Rehab Potential/Prognosis, Swallowing: good, to achieve stated therapy goals (22)  Therapy Frequency (Swallow): PRN (22)  Predicted Duration Therapy Intervention (Days): until discharge (22)                                  Plan of Care Reviewed With: patient  Outcome Evaluation: Clinical swallow evaluation completed recommend mechanical soft and thin liquids and VFSS to further assess swallow. meds whole with thins and small bites and  "sips.      SWALLOW EVALUATION (last 72 hours)     SLP Adult Swallow Evaluation     Row Name 04/02/22 1136                   Rehab Evaluation    Document Type evaluation  -KA        Subjective Information no complaints  -KA        Patient Observations alert;cooperative  -KA        Patient Effort good  -KA        Symptoms Noted During/After Treatment none  -KA                  General Information    Patient Profile Reviewed yes  -KA        Pertinent History Of Current Problem UTI sespis recent history of PNA. Patient with recent choking episode. Patient has been evaluated in the past with speech with recommendation for mechanical soft diet. GI note from this morning stated \"feel patients complaints are oropharyngeal component.\"  -KA        Current Method of Nutrition clear liquids  -KA        Precautions/Limitations, Vision WFL  -KA        Precautions/Limitations, Hearing WFL  -KA        Prior Level of Function-Communication WFL  -KA        Prior Level of Function-Swallowing no diet consistency restrictions;esophageal concerns  -KA        Plans/Goals Discussed with patient  -KA        Barriers to Rehab medically complex  -KA        Patient's Goals for Discharge no concerns voiced  -KA                  Oral Motor Structure and Function    Dentition Assessment natural, present and adequate  -KA        Secretion Management WNL/WFL  -KA        Mucosal Quality moist, healthy  -KA                  Oral Musculature and Cranial Nerve Assessment    Oral Motor General Assessment WFL  -KA                  General Eating/Swallowing Observations    Respiratory Support Currently in Use room air  -KA        Eating/Swallowing Skills self-fed  -KA        Positioning During Eating upright in bed  -KA        Utensils Used spoon;cup;straw  -KA        Consistencies Trialed soft textures;chopped;mixed consistency;pureed;thin liquids  -KA        Pre SpO2 (%) 98  -KA        Post SpO2 (%) 98  -KA                  Clinical Swallow Eval    " "Clinical Swallow Evaluation Summary Patient demonstrated no overt s/s of pen/asp with thins via straw, puree and mechanical soft trials. Prolonged mastication with mechanical soft despite adequate dentition. Patient is vague with her swallowing symptoms reports she \"gags and coughs on food sometimes,\" and stated yes when SLP asked if food feels stuck.  -                  SLP Evaluation Clinical Impression    SLP Swallowing Diagnosis R/O pharyngeal dysphagia;esophageal dysphagia  -        Functional Impact risk of aspiration/pneumonia  -KA        Rehab Potential/Prognosis, Swallowing good, to achieve stated therapy goals  -        Swallow Criteria for Skilled Therapeutic Interventions Met demonstrates skilled criteria  -                  SLP Treatment Clinical Impressions    Care Plan Review evaluation/treatment results reviewed  -                  Recommendations    Therapy Frequency (Swallow) PRN  -KA        Predicted Duration Therapy Intervention (Days) until discharge  -        SLP Diet Recommendation soft textures;ground;thin liquids  -        Recommended Diagnostics VFSS ([facility])  -KA        Recommended Precautions and Strategies upright posture during/after eating;small bites of food and sips of liquid  -        Oral Care Recommendations Oral Care BID/PRN  -KA        SLP Rec. for Method of Medication Administration meds whole;as tolerated  -        Monitor for Signs of Aspiration yes;notify SLP if any concerns  -KA        Anticipated Discharge Disposition (SLP) unknown  -KA                  Swallow Goals (SLP)    Oral Nutrition/Hydration Goal Selection (SLP) oral nutrition/hydration, SLP goal 1  -KA                  Oral Nutrition/Hydration Goal 1 (SLP)    Oral Nutrition/Hydration Goal 1, SLP Patient will tolerate least restrictive diet without overt s/s of pen/asp  -KA        Time Frame (Oral Nutrition/Hydration Goal 1, SLP) short term goal (STG);by discharge  -              User Key  (r) = " Recorded By, (t) = Taken By, (c) = Cosigned By    Initials Name Effective Dates    Winston Bryant MA,CCC-SLP 06/16/21 -                 EDUCATION  The patient has been educated in the following areas:   Dysphagia (Swallowing Impairment).        SLP GOALS     Row Name 04/02/22 1136             Oral Nutrition/Hydration Goal 1 (SLP)    Oral Nutrition/Hydration Goal 1, SLP Patient will tolerate least restrictive diet without overt s/s of pen/asp  -KA      Time Frame (Oral Nutrition/Hydration Goal 1, SLP) short term goal (STG);by discharge  -KA            User Key  (r) = Recorded By, (t) = Taken By, (c) = Cosigned By    Initials Name Provider Type    Winston Bryant MA,CCC-SLP Speech and Language Pathologist              SLP Outcome Measures (last 72 hours)     SLP Outcome Measures     Row Name 04/02/22 1200             SLP Outcome Measures    Outcome Measure Used? Adult NOMS  -KA              Adult FCM Scores    FCM Chosen Swallowing  -KA      Swallowing FCM Score 5  -KA            User Key  (r) = Recorded By, (t) = Taken By, (c) = Cosigned By    Initials Name Effective Dates    Winston Bryant MA,CCC-SLP 06/16/21 -                  Time Calculation:    Time Calculation- SLP     Row Name 04/02/22 1217             Time Calculation- SLP    SLP Start Time 1100  -KA      SLP Received On 04/02/22  -KA              Untimed Charges    SLP Eval/Re-eval  ST Eval Oral Pharyng Swallow - 01353  -KA      [id number] Eval Oral Pharyng Swallow Minutes 45  -KA              Total Minutes    Untimed Charges Total Minutes 45  -KA       Total Minutes 45  -KA            User Key  (r) = Recorded By, (t) = Taken By, (c) = Cosigned By    Initials Name Provider Type    Winston Bryant MA,CCC-SLP Speech and Language Pathologist                Therapy Charges for Today     Code Description Service Date Service Provider Modifiers Qty    [id number] HC ST EVAL ORAL PHARYNG SWALLOW 3 4/2/2022 Winston Diaz MA,CCC-SLP GN 1                Winston Diaz MA,CCC-SLP  4/2/2022

## 2022-04-02 NOTE — PLAN OF CARE
Goal Outcome Evaluation:  Plan of Care Reviewed With: patient        Progress: no change    Patient AO X3 some forgetfulness / not in any distress or discomfort during night tour/ room air / sinus rhythm / turn Q2 hours / clear liquid diet / GI consult pending / VS stable /  Rocephin IV for UTI

## 2022-04-02 NOTE — CONSULTS
[facility]  Initial Inpatient Consult Note    Referring Provider: Dr. Rigoberto Law    Reason for Consultation: Choking episode, unable to eat    Subjective     History of present illness:    89 y.o. female with history of recent pneumonia, was discharged on March 24.  Subsequent to that discharge she had an episode of swallowing difficulty with oatmeal and denies any previous issues of this nature.  She states she had a swallow study done at the nursing facility which per her account was normal.  No prior history of reflux or peptic ulcer disease according to her history but reference made to GERD in the admitting note.  She states the symptoms were associated with choking sensation and subsequent cough.    Past Medical History:  Past Medical History:   Diagnosis Date   • Arthritis    • Diabetes mellitus (HCC)    • Hyperlipidemia    • Hypertension      Past Surgical History:  No past surgical history on file.   Social History:   Social History     Tobacco Use   • Smoking status: Former Smoker   • Smokeless tobacco: Former User   Substance Use Topics   • Alcohol use: Not on file      Family History:  No family history on file.    Home Meds:  Medications Prior to Admission   Medication Sig Dispense Refill Last Dose   • acetaminophen (TYLENOL) 500 MG tablet Take 500 mg by mouth Every 6 (Six) Hours As Needed for Mild Pain .      • amLODIPine (NORVASC) 10 MG tablet Take 1 tablet by mouth Daily for 30 days. 30 tablet 0    • aspirin 81 MG EC tablet Take 81 mg by mouth Daily.      • cloNIDine (CATAPRES) 0.2 MG tablet Take 1 tablet by mouth Every 8 (Eight) Hours for 30 days. 90 tablet 0    • FLUoxetine (PROzac) 20 MG capsule Take 20 mg by mouth Daily.      • gabapentin (NEURONTIN) 100 MG capsule Take 100 mg by mouth Every Night.      • guaiFENesin (ROBITUSSIN) 100 MG/5ML solution oral solution Take 200 mg by mouth Every 4 (Four) Hours As Needed.      • hydrALAZINE (APRESOLINE) 50 MG tablet Take 1  tablet by mouth Every 8 (Eight) Hours for 30 days. 90 tablet 0    • insulin lispro (humaLOG) 100 UNIT/ML injection Inject 0-24 Units under the skin into the appropriate area as directed 4 (Four) Times a Day With Meals & at Bedtime.  12    • melatonin 3 MG tablet Take 3 mg by mouth Every Night.      • omeprazole (priLOSEC) 20 MG capsule Take 20 mg by mouth 2 (Two) Times a Day.      • potassium chloride (K-DUR,KLOR-CON) 20 MEQ CR tablet Take 1 tablet by mouth 2 (Two) Times a Day With Meals for 30 days. 60 tablet 0    • pravastatin (PRAVACHOL) 10 MG tablet Take 1 tablet by mouth Every Night for 30 days. 30 tablet 0    • valsartan (DIOVAN) 320 MG tablet Take 1 tablet by mouth Daily for 30 days. 30 tablet 0      Current Meds:   amLODIPine, 10 mg, Oral, Q24H  aspirin, 81 mg, Oral, Daily  cefTRIAXone, 1 g, Intravenous, Q24H  cloNIDine, 0.2 mg, Oral, Q8H  FLUoxetine, 20 mg, Oral, Daily  gabapentin, 100 mg, Oral, Nightly  hydrALAZINE, 100 mg, Oral, Q8H  insulin lispro, 0-7 Units, Subcutaneous, 4x Daily With Meals & Nightly  pantoprazole, 40 mg, Oral, BID AC  potassium chloride, 20 mEq, Oral, BID With Meals  pravastatin, 10 mg, Oral, Nightly  valsartan, 320 mg, Oral, Q24H      Allergies:  No Known Allergies  Review of Systems  Pertinent items are noted in HPI, all other systems reviewed and negative     Objective     Vital Signs  Temp:  [97.8 °F (36.6 °C)-98.6 °F (37 °C)] 97.8 °F (36.6 °C)  Heart Rate:  [] 60  Resp:  [17-22] 17  BP: (118-186)/(50-79) 118/50  Physical Exam:  General Appearance:    Alert, cooperative, in no acute distress   Head:    Normocephalic, without obvious abnormality, atraumatic   Eyes:          conjunctivae and sclerae normal, no   icterus   Throat:   no thrush, oral mucosa moist   Neck:   Supple, no adenopathy   Lungs:     Clear to auscultation bilaterally    Heart:    Regular rhythm and normal rate    Chest Wall:    No abnormalities observed   Abdomen:     Soft, nondistended, nontender; normal  bowel sounds   Extremities:   no edema, no redness   Skin:   No bruising or rash   Psychiatric:  normal mood and insight     Results Review:   I reviewed the patient's new clinical results.    Results from last 7 days   Lab Units 04/02/22  0559 04/01/22  1503   WBC 10*3/mm3 9.12 12.89*   HEMOGLOBIN g/dL 9.8* 10.9*   HEMATOCRIT % 29.7* 33.3*   PLATELETS 10*3/mm3 388 473*     Results from last 7 days   Lab Units 04/02/22  0559 04/01/22  1503   SODIUM mmol/L 138 138   POTASSIUM mmol/L 3.8 4.1   CHLORIDE mmol/L 108* 105   CO2 mmol/L 20.0* 17.0*   BUN mg/dL 10 11   CREATININE mg/dL 0.78 0.93   CALCIUM mg/dL 7.8* 8.4   BILIRUBIN mg/dL  --  0.4   ALK PHOS U/L  --  218*   ALT (SGPT) U/L  --  30   AST (SGOT) U/L  --  28   GLUCOSE mg/dL 163* 214*         Lab Results   Lab Value Date/Time    LIPASE 54 04/01/2022 1503    LIPASE 36 03/16/2022 1536       Radiology:  No orders to display       Assessment/Plan   Patient Active Problem List   Diagnosis   • Diabetic ketoacidosis without coma associated with type 2 diabetes mellitus (HCC)   • Pneumonia of left lower lobe due to infectious organism   • Acute UTI       Assessment:  1. Dysphagia: Sounds as if it may be an oral pharyngeal component given the location she describes.  She states she is still able to take her medications.  2. History of pneumonia  3. History of UTI    Plan:  · Speech pathology evaluation  · Pending those results she may warrant endoscopic evaluation      I discussed the patients findings and my recommendations with patient and nursing staff.    Jeremy Mcmullen MD

## 2022-04-02 NOTE — PLAN OF CARE
Goal Outcome Evaluation:  Plan of Care Reviewed With: patient           Outcome Evaluation: Clinical swallow evaluation completed recommend mechanical soft and thin liquids and VFSS to further assess swallow. meds whole with thins and small bites and sips.

## 2022-04-02 NOTE — PROGRESS NOTES
"Daily progress note    Chief complaint  Doing same  No new complaints  Denies any nausea vomiting abdominal pain diarrhea  Tolerating soft diet    History of present illness  89-year-old -American female who is well-known to our service with history of peripheral artery disease status post right AKA who also have diabetes mellitus hypertension hyperlipidemia gastroesophageal reflux disease who has been immobilized and was recently treated for UTI pneumonia and discharged back to nursing home in stable condition sent to the ER that she has had a choking episode and unable to eat with generalized weakness.  Patient evaluated in ER admit for further work-up.  Patient fully alert oriented denies any fever cough chest pain increase shortness of breath abdominal pain nausea vomiting diarrhea.     REVIEW OF SYSTEMS  Review of all 14 systems is negative other than stated in the HPI above.     PHYSICAL EXAM  Blood pressure 173/56, pulse 65, temperature 97.8 °F (36.6 °C), temperature source Oral, resp. rate 17, height 157.5 cm (62.01\"), weight 69.3 kg (152 lb 12.5 oz), SpO2 96 %.    GENERAL: Awake and alert, no acute distress  HENT: nares patent, oropharynx clear, dentures present  EYES: no scleral icterus, EOMI  CV: regular rhythm, normal rate, no murmur appreciated  RESPIRATORY: normal effort, lungs clear auscultation bilaterally  ABDOMEN: soft, nondistended, nontender throughout  MUSCULOSKELETAL: Status post right AKA, left lower extremity is normal to inspection  NEURO: alert, moves all extremities, follows commands  PSYCH:  calm, cooperative  SKIN: warm, dry     LAB RESULTS   Lab Results (last 24 hours)     Procedure Component Value Units Date/Time    POC Glucose Once [[id number]]  (Normal) Collected: 04/02/22 1104    Specimen: Blood Updated: 04/02/22 1106     Glucose 70 mg/dL      Comment: Meter: [id number] : [id number] Roberto Carlos [place]       Urine Culture - Urine, Urine, Catheter [[id number]]  (Normal) " Collected: 04/01/22 1526    Specimen: Urine, Catheter Updated: 04/02/22 1008     Urine Culture No growth    Basic Metabolic Panel [[id number]]  (Abnormal) Collected: 04/02/22 0559    Specimen: Blood Updated: 04/02/22 0704     Glucose 163 mg/dL      BUN 10 mg/dL      Creatinine 0.78 mg/dL      Sodium 138 mmol/L      Potassium 3.8 mmol/L      Chloride 108 mmol/L      CO2 20.0 mmol/L      Calcium 7.8 mg/dL      BUN/Creatinine Ratio 12.8     Anion Gap 10.0 mmol/L      eGFR 72.3 mL/min/1.73      Comment: National Kidney Foundation and American Society of Nephrology (ASN) Task Force recommended calculation based on the Chronic Kidney Disease Epidemiology Collaboration (CKD-EPI) equation refit without adjustment for race.       Narrative:      GFR Normal >60  Chronic Kidney Disease <60  Kidney Failure <15      CBC & Differential [[id number]]  (Abnormal) Collected: 04/02/22 0559    Specimen: Blood Updated: 04/02/22 0621    Narrative:      The following orders were created for panel order CBC & Differential.  Procedure                               Abnormality         Status                     ---------                               -----------         ------                     CBC Auto Differential[[id number]]        Abnormal            Final result                 Please view results for these tests on the individual orders.    CBC Auto Differential [[id number]]  (Abnormal) Collected: 04/02/22 0559    Specimen: Blood Updated: 04/02/22 0621     WBC 9.12 10*3/mm3      RBC 3.37 10*6/mm3      Hemoglobin 9.8 g/dL      Hematocrit 29.7 %      MCV 88.1 fL      MCH 29.1 pg      MCHC 33.0 g/dL      RDW 14.6 %      RDW-SD 47.0 fl      MPV 9.7 fL      Platelets 388 10*3/mm3      Neutrophil % 52.0 %      Lymphocyte % 33.2 %      Monocyte % 12.1 %      Eosinophil % 1.9 %      Basophil % 0.4 %      Immature Grans % 0.4 %      Neutrophils, Absolute 4.74 10*3/mm3      Lymphocytes, Absolute 3.03 10*3/mm3      Monocytes, Absolute 1.10 10*3/mm3       Eosinophils, Absolute 0.17 10*3/mm3      Basophils, Absolute 0.04 10*3/mm3      Immature Grans, Absolute 0.04 10*3/mm3      nRBC 0.0 /100 WBC     POC Glucose Once [[id number]]  (Abnormal) Collected: 04/02/22 0558    Specimen: Blood Updated: 04/02/22 0603     Glucose 160 mg/dL      Comment: Meter: [id number] : [id number] Angel [place]       POC Glucose Once [[id number]]  (Abnormal) Collected: 04/01/22 2114    Specimen: Blood Updated: 04/01/22 2120     Glucose 152 mg/dL      Comment: Meter: [id number] : [id number] Angel [place]       COVID PRE-OP / PRE-PROCEDURE SCREENING ORDER (NO ISOLATION) - Swab, Nasopharynx [[id number]]  (Normal) Collected: 04/01/22 1618    Specimen: Swab from Nasopharynx Updated: 04/01/22 1704    Narrative:      The following orders were created for panel order COVID PRE-OP / PRE-PROCEDURE SCREENING ORDER (NO ISOLATION) - Swab, Nasopharynx.  Procedure                               Abnormality         Status                     ---------                               -----------         ------                     COVID-19,[facility]...[[id number]]  Normal              Final result                 Please view results for these tests on the individual orders.    COVID-19,[facility] CEPHEID/ELBERT NP SWAB IN TRANSPORT MEDIA 8-12 HR TAT - Swab, Nasopharynx [[id number]]  (Normal) Collected: 04/01/22 1618    Specimen: Swab from Nasopharynx Updated: 04/01/22 1704     COVID19 Not Detected    Narrative:      Fact sheet for providers: https://www.fda.gov/media/251633/download    Fact sheet for patients: https://www.fda.gov/media/354575/download    Test performed by PCR.    Urinalysis With Microscopic If Indicated (No Culture) - Urine, Catheter [[id number]]  (Abnormal) Collected: 04/01/22 1526    Specimen: Urine, Catheter Updated: 04/01/22 1559     Color, UA Yellow     Appearance, UA Turbid     pH, UA <=5.0     Specific Gravity, UA 1.013     Glucose, UA Negative     Ketones, UA Negative      Bilirubin, UA Negative     Blood, UA Trace     Protein,  mg/dL (2+)     Leuk Esterase, UA Large (3+)     Nitrite, UA Negative     Urobilinogen, UA 0.2 E.U./dL    Urinalysis, Microscopic Only - Urine, Catheter [[id number]]  (Abnormal) Collected: 04/01/22 1526    Specimen: Urine, Catheter Updated: 04/01/22 1559     RBC, UA 6-12 /HPF      WBC, UA Too Numerous to Count /HPF      Bacteria, UA 1+ /HPF      Squamous Epithelial Cells, UA 0-2 /HPF      Yeast, UA Moderate/2+ Yeast /HPF      Hyaline Casts, UA 3-6 /LPF      Methodology Manual Light Microscopy    Comprehensive Metabolic Panel [[id number]]  (Abnormal) Collected: 04/01/22 1503    Specimen: Blood Updated: 04/01/22 1540     Glucose 214 mg/dL      BUN 11 mg/dL      Creatinine 0.93 mg/dL      Sodium 138 mmol/L      Potassium 4.1 mmol/L      Chloride 105 mmol/L      CO2 17.0 mmol/L      Calcium 8.4 mg/dL      Total Protein 6.3 g/dL      Albumin 2.70 g/dL      ALT (SGPT) 30 U/L      AST (SGOT) 28 U/L      Alkaline Phosphatase 218 U/L      Total Bilirubin 0.4 mg/dL      Globulin 3.6 gm/dL      A/G Ratio 0.8 g/dL      BUN/Creatinine Ratio 11.8     Anion Gap 16.0 mmol/L      eGFR 58.5 mL/min/1.73      Comment: National Kidney Foundation and American Society of Nephrology (ASN) Task Force recommended calculation based on the Chronic Kidney Disease Epidemiology Collaboration (CKD-EPI) equation refit without adjustment for race.       Narrative:      GFR Normal >60  Chronic Kidney Disease <60  Kidney Failure <15      Lipase [[id number]]  (Normal) Collected: 04/01/22 1503    Specimen: Blood Updated: 04/01/22 1540     Lipase 54 U/L     CBC & Differential [[id number]]  (Abnormal) Collected: 04/01/22 1503    Specimen: Blood Updated: 04/01/22 1519    Narrative:      The following orders were created for panel order CBC & Differential.  Procedure                               Abnormality         Status                     ---------                               -----------          ------                     CBC Auto Differential[[id number]]        Abnormal            Final result                 Please view results for these tests on the individual orders.    CBC Auto Differential [[id number]]  (Abnormal) Collected: 04/01/22 1503    Specimen: Blood Updated: 04/01/22 1519     WBC 12.89 10*3/mm3      RBC 3.76 10*6/mm3      Hemoglobin 10.9 g/dL      Hematocrit 33.3 %      MCV 88.6 fL      MCH 29.0 pg      MCHC 32.7 g/dL      RDW 14.8 %      RDW-SD 48.0 fl      MPV 10.1 fL      Platelets 473 10*3/mm3      Neutrophil % 73.7 %      Lymphocyte % 17.8 %      Monocyte % 7.8 %      Eosinophil % 0.0 %      Basophil % 0.3 %      Immature Grans % 0.4 %      Neutrophils, Absolute 9.50 10*3/mm3      Lymphocytes, Absolute 2.30 10*3/mm3      Monocytes, Absolute 1.00 10*3/mm3      Eosinophils, Absolute 0.00 10*3/mm3      Basophils, Absolute 0.04 10*3/mm3      Immature Grans, Absolute 0.05 10*3/mm3      nRBC 0.0 /100 WBC         Imaging Results (Last 24 Hours)     Procedure Component Value Units Date/Time    FL Esophagram Complete Single Contrast [[id number]] Resulted: 04/02/22 1347     Updated: 04/02/22 1444          Current Facility-Administered Medications:   •  amLODIPine (NORVASC) tablet 10 mg, 10 mg, Oral, Q24H, Rigoberto Law MD, 10 mg at 04/01/22 2119  •  aspirin EC tablet 81 mg, 81 mg, Oral, Daily, Rigoberto Law MD, 81 mg at 04/02/22 0834  •  cefTRIAXone (ROCEPHIN) in [facility] 1 gram/10ml IV PUSH syringe, 1 g, Intravenous, Q24H, Rigoberto Law MD  •  cloNIDine (CATAPRES) tablet 0.2 mg, 0.2 mg, Oral, Q8H, Rigoberto Law MD, 0.2 mg at 04/02/22 1405  •  FLUoxetine (PROzac) capsule 20 mg, 20 mg, Oral, Daily, Rigoberto Law MD, 20 mg at 04/01/22 2123  •  gabapentin (NEURONTIN) capsule 100 mg, 100 mg, Oral, Nightly, Rigoberto Law MD, 100 mg at 04/01/22 2119  •  hydrALAZINE (APRESOLINE) tablet 100 mg, 100 mg, Oral, Q8H, Rigoberto Law MD, 100 mg at 04/02/22 1405  •  insulin lispro (ADMELOG) injection 0-7 Units, 0-7  Units, Subcutaneous, 4x Daily With Meals & Nightly, Vance Villeda MD, 2 Units at 04/02/22 0834  •  melatonin tablet 3 mg, 3 mg, Oral, Nightly PRN, Vance Villeda MD  •  ondansetron (ZOFRAN) injection 4 mg, 4 mg, Intravenous, Q6H PRN, Vance Villeda MD  •  pantoprazole (PROTONIX) EC tablet 40 mg, 40 mg, Oral, BID AC, Vance Villeda MD, 40 mg at 04/02/22 0642  •  potassium chloride (KLOR-CON) packet 20 mEq, 20 mEq, Oral, BID With Meals, Vance Villeda MD, 20 mEq at 04/02/22 1051  •  pravastatin (PRAVACHOL) tablet 10 mg, 10 mg, Oral, Nightly, Vance Villeda MD  •  [COMPLETED] Insert peripheral IV, , , Once **AND** sodium chloride 0.9 % flush 10 mL, 10 mL, Intravenous, PRN, BerryWaldo gonzalez MD  •  valsartan (DIOVAN) tablet 320 mg, 320 mg, Oral, Q24H, Vance Villeda MD, 320 mg at 04/01/22 2119     ASSESSMENT  Oropharyngeal dysphagia  Persistent nausea vomiting  Acute UTI  Recent aspiration pneumonia  Diabetes mellitus  Hypertension  Hyperlipidemia  Peripheral artery disease status post right AKA  Immobilization syndrome  Gastroesophageal reflux disease    PLAN  CPM  Continue soft diet  Review of swallow per speech pathology  Continue IV antibiotics  GI consult appreciated  Continue nursing home medications  Stress ulcer DVT prophylaxis  Supportive care  DNR  PT/OT  Discussed with family staff and gastroenterology  Follow closely further recommendation current hospital course    VANCE VILLEDA MD

## 2022-04-03 NOTE — PROGRESS NOTES
"Daily progress note    Chief complaint  Doing same  No new complaints  Denies any nausea vomiting abdominal pain diarrhea  Tolerating soft diet    History of present illness  89-year-old -American female who is well-known to our service with history of peripheral artery disease status post right AKA who also have diabetes mellitus hypertension hyperlipidemia gastroesophageal reflux disease who has been immobilized and was recently treated for UTI pneumonia and discharged back to nursing home in stable condition sent to the ER that she has had a choking episode and unable to eat with generalized weakness.  Patient evaluated in ER admit for further work-up.  Patient fully alert oriented denies any fever cough chest pain increase shortness of breath abdominal pain nausea vomiting diarrhea.     REVIEW OF SYSTEMS  Unremarkable except generalized weakness     PHYSICAL EXAM  Blood pressure 138/54, pulse 60, temperature 98.9 °F (37.2 °C), temperature source Oral, resp. rate 18, height 157.5 cm (62.01\"), weight 69.3 kg (152 lb 12.5 oz), SpO2 97 %.    GENERAL: Awake and alert, no acute distress  HENT: nares patent, oropharynx clear, dentures present  EYES: no scleral icterus, EOMI  CV: regular rhythm, normal rate, no murmur appreciated  RESPIRATORY: normal effort, lungs clear auscultation bilaterally  ABDOMEN: soft, nondistended, nontender throughout  MUSCULOSKELETAL: Status post right AKA, left lower extremity is normal to inspection  NEURO: alert, moves all extremities, follows commands  PSYCH:  calm, cooperative  SKIN: warm, dry     LAB RESULTS   Lab Results (last 24 hours)     Procedure Component Value Units Date/Time    CANDIDA AURIS SCREEN - Swab, Axilla Right, Axilla Left and Groin [[id number]] Collected: 04/03/22 1407    Specimen: Swab from Axilla Right, Axilla Left and Groin Updated: 04/03/22 1417    POC Glucose Once [[id number]]  (Abnormal) Collected: 04/03/22 1104    Specimen: Blood Updated: 04/03/22 1106     " Glucose 145 mg/dL      Comment: Meter: [id number] : [id number] Porfirio [place]       Basic Metabolic Panel [[id number]]  (Abnormal) Collected: 04/03/22 0722    Specimen: Blood Updated: 04/03/22 0846     Glucose 155 mg/dL      BUN 12 mg/dL      Creatinine 1.06 mg/dL      Sodium 136 mmol/L      Potassium 4.6 mmol/L      Chloride 107 mmol/L      CO2 19.0 mmol/L      Calcium 7.9 mg/dL      BUN/Creatinine Ratio 11.3     Anion Gap 10.0 mmol/L      eGFR 50.0 mL/min/1.73      Comment: National Kidney Foundation and American Society of Nephrology (ASN) Task Force recommended calculation based on the Chronic Kidney Disease Epidemiology Collaboration (CKD-EPI) equation refit without adjustment for race.       Narrative:      GFR Normal >60  Chronic Kidney Disease <60  Kidney Failure <15      CBC & Differential [[id number]]  (Abnormal) Collected: 04/03/22 0722    Specimen: Blood Updated: 04/03/22 0832    Narrative:      The following orders were created for panel order CBC & Differential.  Procedure                               Abnormality         Status                     ---------                               -----------         ------                     CBC Auto Differential[[id number]]        Abnormal            Final result                 Please view results for these tests on the individual orders.    CBC Auto Differential [[id number]]  (Abnormal) Collected: 04/03/22 0722    Specimen: Blood Updated: 04/03/22 0832     WBC 10.96 10*3/mm3      RBC 3.42 10*6/mm3      Hemoglobin 10.0 g/dL      Hematocrit 30.1 %      MCV 88.0 fL      MCH 29.2 pg      MCHC 33.2 g/dL      RDW 15.4 %      RDW-SD 48.6 fl      MPV 10.4 fL      Platelets 415 10*3/mm3      Neutrophil % 55.8 %      Lymphocyte % 32.4 %      Monocyte % 9.5 %      Eosinophil % 1.3 %      Basophil % 0.6 %      Immature Grans % 0.4 %      Neutrophils, Absolute 6.12 10*3/mm3      Lymphocytes, Absolute 3.55 10*3/mm3      Monocytes, Absolute 1.04 10*3/mm3       Eosinophils, Absolute 0.14 10*3/mm3      Basophils, Absolute 0.07 10*3/mm3      Immature Grans, Absolute 0.04 10*3/mm3      nRBC 0.1 /100 WBC     POC Glucose Once [[id number]]  (Abnormal) Collected: 04/03/22 0624    Specimen: Blood Updated: 04/03/22 0625     Glucose 156 mg/dL      Comment: Meter: [id number] : [id number] Noel Dianne NA       POC Glucose Once [[id number]]  (Normal) Collected: 04/02/22 2107    Specimen: Blood Updated: 04/02/22 2108     Glucose 97 mg/dL      Comment: Meter: [id number] : [id number] Noel Dianne NA       POC Glucose Once [[id number]]  (Abnormal) Collected: 04/02/22 1700    Specimen: Blood Updated: 04/02/22 1702     Glucose 172 mg/dL      Comment: Meter: [id number] : [id number] Roberto Carlos Jorge NA       Urine Culture - Urine, Urine, Catheter [[id number]]  (Abnormal) Collected: 04/01/22 1526    Specimen: Urine, Catheter Updated: 04/02/22 1515     Urine Culture Yeast isolated    Narrative:      No additional tests pending.        Imaging Results (Last 24 Hours)     Procedure Component Value Units Date/Time    FL Esophagram Complete Single Contrast [[id number]] Collected: 04/02/22 1527     Updated: 04/02/22 1617    Narrative:      ESOPHAGRAM     HISTORY: Dysphagia.     TECHNIQUE: An initial AP view of the chest was obtained.      FINDINGS: The lungs are well-expanded and clear. There is cardiomegaly  with prominent calcification of the mitral annulus. No acute abnormality  is seen.     The patient was evaluated in the decubitus and supine positions and  swallowed barium with considerable coaxing. There is normal  distensibility of the hypopharynx and cervical esophagus. The remainder  of the esophagus is also normal in appearance and there is no evidence  of stricture.     CONCLUSION: Negative esophagram. 103 images were obtained and the  fluoroscopy time measures 32 seconds.     This report was finalized on 4/2/2022 4:14 PM by Dr. Ran Villalta M.D.             Current  Facility-Administered Medications:   •  acetaminophen (TYLENOL) tablet 650 mg, 650 mg, Oral, Q6H PRN, Rigoberto Law MD, 650 mg at 04/03/22 1142  •  amLODIPine (NORVASC) tablet 10 mg, 10 mg, Oral, Q24H, Rigoberto Law MD, 10 mg at 04/03/22 0820  •  aspirin EC tablet 81 mg, 81 mg, Oral, Daily, Rigoberto Law MD, 81 mg at 04/03/22 0820  •  cefTRIAXone (ROCEPHIN) in [facility] 1 gram/10ml IV PUSH syringe, 1 g, Intravenous, Q24H, Rigoberto Law MD, 1 g at 04/02/22 1709  •  cloNIDine (CATAPRES) tablet 0.2 mg, 0.2 mg, Oral, Q8H, Rigoberto Law MD, 0.2 mg at 04/03/22 1402  •  FLUoxetine (PROzac) capsule 20 mg, 20 mg, Oral, Daily, Rigoberto Law MD, 20 mg at 04/03/22 0820  •  gabapentin (NEURONTIN) capsule 100 mg, 100 mg, Oral, Nightly, Rigoberto Law MD, 100 mg at 04/02/22 2122  •  hydrALAZINE (APRESOLINE) tablet 100 mg, 100 mg, Oral, Q8H, Rigoberto Law MD, 100 mg at 04/03/22 1402  •  insulin lispro (ADMELOG) injection 0-7 Units, 0-7 Units, Subcutaneous, 4x Daily With Meals & Nightly, Rigoberto Law MD, 2 Units at 04/03/22 0820  •  melatonin tablet 3 mg, 3 mg, Oral, Nightly PRN, Rigoberto Law MD  •  ondansetron (ZOFRAN) injection 4 mg, 4 mg, Intravenous, Q6H PRN, Rigoberto Law MD  •  pantoprazole (PROTONIX) EC tablet 40 mg, 40 mg, Oral, BID AC, Rigoberto Law MD, 40 mg at 04/03/22 0713  •  potassium chloride (KLOR-CON) packet 20 mEq, 20 mEq, Oral, BID With Meals, Rigoberto Law MD, 20 mEq at 04/03/22 0820  •  pravastatin (PRAVACHOL) tablet 10 mg, 10 mg, Oral, Nightly, Rigoberto Law MD, 10 mg at 04/02/22 2122  •  [COMPLETED] Insert peripheral IV, , , Once **AND** sodium chloride 0.9 % flush 10 mL, 10 mL, Intravenous, PRN, Elia, aWldo MAE MD  •  valsartan (DIOVAN) tablet 320 mg, 320 mg, Oral, Q24H, Rigoberto Law MD, 320 mg at 04/03/22 0820     ASSESSMENT  Oropharyngeal dysphagia  Persistent nausea vomiting resolved  Acute yeast UTI  Recent aspiration pneumonia  Diabetes mellitus  Hypertension  Hyperlipidemia  Peripheral artery disease  status post right AKA  Immobilization syndrome  Gastroesophageal reflux disease    PLAN  CPM  Continue soft diet  With you swallow study in a.m.  Diflucan for 5 days  GI consult appreciated  Continue nursing home medications  Stress ulcer DVT prophylaxis  Supportive care  DNR  PT/OT  Discussed with family staff and gastroenterology  Follow closely further recommendation current hospital course    VANCE VILLEDA MD

## 2022-04-03 NOTE — PROGRESS NOTES
[facility]  Inpatient Progress Note    Reason for Follow Up:  Choking episode, unable to eat    Subjective     Interval History:   She describes oropharyngeal dysphagia.  Her barium swallow on 4/2/2022 was unrevealing.    Current Facility-Administered Medications:   •  acetaminophen (TYLENOL) tablet 650 mg, 650 mg, Oral, Q6H PRN, Rigoberto Law MD, 650 mg at 04/03/22 1142  •  amLODIPine (NORVASC) tablet 10 mg, 10 mg, Oral, Q24H, Rigoberto Law MD, 10 mg at 04/03/22 0820  •  aspirin EC tablet 81 mg, 81 mg, Oral, Daily, Rigoberto Law MD, 81 mg at 04/03/22 0820  •  cefTRIAXone (ROCEPHIN) in [facility] 1 gram/10ml IV PUSH syringe, 1 g, Intravenous, Q24H, Rigoberto Law MD, 1 g at 04/02/22 1709  •  cloNIDine (CATAPRES) tablet 0.2 mg, 0.2 mg, Oral, Q8H, Rigoberto Law MD, 0.2 mg at 04/03/22 0714  •  FLUoxetine (PROzac) capsule 20 mg, 20 mg, Oral, Daily, Rigoberto Law MD, 20 mg at 04/03/22 0820  •  gabapentin (NEURONTIN) capsule 100 mg, 100 mg, Oral, Nightly, Rigoberto Law MD, 100 mg at 04/02/22 2122  •  hydrALAZINE (APRESOLINE) tablet 100 mg, 100 mg, Oral, Q8H, Rigoberto Law MD, 100 mg at 04/03/22 0713  •  insulin lispro (ADMELOG) injection 0-7 Units, 0-7 Units, Subcutaneous, 4x Daily With Meals & Nightly, Rigoberto Law MD, 2 Units at 04/03/22 0820  •  melatonin tablet 3 mg, 3 mg, Oral, Nightly PRN, Rigoberto Law MD  •  ondansetron (ZOFRAN) injection 4 mg, 4 mg, Intravenous, Q6H PRN, Rigoberto Law MD  •  pantoprazole (PROTONIX) EC tablet 40 mg, 40 mg, Oral, BID AC, Rigoberto Law MD, 40 mg at 04/03/22 0713  •  potassium chloride (KLOR-CON) packet 20 mEq, 20 mEq, Oral, BID With Meals, Rigoberto Law MD, 20 mEq at 04/03/22 0820  •  pravastatin (PRAVACHOL) tablet 10 mg, 10 mg, Oral, Nightly, Rigoberto Law MD, 10 mg at 04/02/22 2122  •  [COMPLETED] Insert peripheral IV, , , Once **AND** sodium chloride 0.9 % flush 10 mL, 10 mL, Intravenous, PRN, Elia, Waldo MAE MD  •  valsartan (DIOVAN) tablet 320 mg, 320 mg,  Oral, Q24H, Rigoberto Law MD, 320 mg at 04/03/22 0820  Review of Systems:    The following systems were reviewed and negative;  constitution, respiratory, cardiovascular, musculoskeletal and neurological    Objective     Vital Signs  Temp:  [97.6 °F (36.4 °C)-98.9 °F (37.2 °C)] 98.9 °F (37.2 °C)  Heart Rate:  [60-89] 65  Resp:  [17-18] 18  BP: (131-169)/(47-73) 131/47  Body mass index is 27.94 kg/m².    Intake/Output Summary (Last 24 hours) at 4/3/2022 1354  Last data filed at 4/3/2022 0446  Gross per 24 hour   Intake 120 ml   Output --   Net 120 ml     No intake/output data recorded.     Physical Exam:   General: patient awake, alert and cooperative   Eyes: Normal lids and lashes, no scleral icterus   Neck: supple, normal ROM   Skin: warm and dry, not jaundiced   Cardiovascular: regular rhythm and rate, no murmurs auscultated   Pulm: clear to auscultation bilaterally, regular and unlabored   Abdomen: soft, nontender, nondistended; normal bowel sounds   Extremities: no rash or edema   Psychiatric: Normal mood and behavior; memory intact     Results Review:     I reviewed the patient's new clinical results.    Results from last 7 days   Lab Units 04/03/22  0722 04/02/22  0559 04/01/22  1503   WBC 10*3/mm3 10.96* 9.12 12.89*   HEMOGLOBIN g/dL 10.0* 9.8* 10.9*   HEMATOCRIT % 30.1* 29.7* 33.3*   PLATELETS 10*3/mm3 415 388 473*     Results from last 7 days   Lab Units 04/03/22  0722 04/02/22  0559 04/01/22  1503   SODIUM mmol/L 136 138 138   POTASSIUM mmol/L 4.6 3.8 4.1   CHLORIDE mmol/L 107 108* 105   CO2 mmol/L 19.0* 20.0* 17.0*   BUN mg/dL 12 10 11   CREATININE mg/dL 1.06* 0.78 0.93   CALCIUM mg/dL 7.9* 7.8* 8.4   BILIRUBIN mg/dL  --   --  0.4   ALK PHOS U/L  --   --  218*   ALT (SGPT) U/L  --   --  30   AST (SGOT) U/L  --   --  28   GLUCOSE mg/dL 155* 163* 214*         Lab Results   Lab Value Date/Time    LIPASE 54 04/01/2022 1503    LIPASE 36 03/16/2022 1536       Radiology:  FL Esophagram Complete Single Contrast    Final Result          Assessment/Plan     Patient Active Problem List   Diagnosis   • Diabetic ketoacidosis without coma associated with type 2 diabetes mellitus (HCC)   • Pneumonia of left lower lobe due to infectious organism   • Acute UTI       Assessment/Recommendations:    Assessment:  1. Dysphagia.  This sounds like oropharyngeal dysphagia.  2. History of pneumonia  3. History of UTI     Plan:  -I will order a video swallow as recommended by speech pathology.    I discussed the patients findings and my recommendations with patient.    Victorino Garcia MD

## 2022-04-04 NOTE — NURSING NOTE
CWOCN- consult for buttocks. Spoke with RN and assessed patient. RN reports that skin continues to be moist and almost peel off. They have been applying magic barrier ointment, cleansing after incontinence, and turning her. Her buttocks and perineum are moist with scattered areas that are macerated or with partial thickness skin loss. RN states patient complains of burning to the skin.   Recommend to continue the magic barrier. Add [place]boro soaks TID to help sooth and dry the skin. Continue to leave the brief off and use the absorbent pads. Add a low air loss mattress.   I called Andrew and ordered a low air loss mattress and a bed frame from [employer].

## 2022-04-04 NOTE — MBS/VFSS/FEES
Acute Care - Speech Language Pathology   Swallow Initial Evaluation [facility]     Patient Name: Gisela Gandara  : 1932  MRN: [id number]  Today's Date: 2022               Admit Date: 2022    Visit Dx:     ICD-10-CM ICD-9-CM   1. Acute UTI  N39.0 599.0   2. Dysphagia, unspecified type  R13.10 787.20   3. Generalized weakness  R53.1 780.79     Patient Active Problem List   Diagnosis   • Diabetic ketoacidosis without coma associated with type 2 diabetes mellitus (HCC)   • Pneumonia of left lower lobe due to infectious organism   • Acute UTI     Past Medical History:   Diagnosis Date   • Arthritis    • Diabetes mellitus (HCC)    • Hyperlipidemia    • Hypertension      No past surgical history on file.    SLP Recommendation and Plan  SLP Swallowing Diagnosis: mild-moderate, oral dysphagia, esophageal dysphagia (22)  SLP Diet Recommendation: other (see comments), puree, thin liquids (or per pt tolerance) (22)  Recommended Precautions and Strategies: upright posture during/after eating, small bites of food and sips of liquid (22)  SLP Rec. for Method of Medication Administration: as tolerated (22)     Monitor for Signs of Aspiration: notify SLP if any concerns (22)     Swallow Criteria for Skilled Therapeutic Interventions Met: no problems identified which require skilled intervention (22)  Anticipated Discharge Disposition (SLP): unknown (22)     Therapy Frequency (Swallow): PRN (22)  Predicted Duration Therapy Intervention (Days): until discharge (22)                                  Plan of Care Reviewed With: patient  Outcome Evaluation: VFSS completed.  Pt demonstrates amild to moderate oral dysphagia characterized by slow mastication and moderate diffuse oral residue of puree and solids.  Unable to clear dry solid residue with liquid wash.  No aspiration observed with any consistency.  Minimal  "pharyngeal residue seen.  Esophageal scan showed delayed clearance of lower esophagus with soft solids, but cleared with a liquid wash.  Unable to determine cause of pt's complaints.  REC diet based on pt tolerance.  Pt states she can swallow applesauce, but mashed potatoes can 'stick', but also reports she 'can't swallow' out of her mouth.  Given reports of choking on soft, recommend consideration of puree or full liquids with management of nutrtion with supplements.      SWALLOW EVALUATION (last 72 hours)     SLP Adult Swallow Evaluation     Row Name 04/04/22 1100 04/02/22 1136                Rehab Evaluation    Document Type evaluation  -SA evaluation  -KA       Subjective Information complains of  food sticking; unable to swallow foods  -SA no complaints  -KA       Patient Observations alert;cooperative  -SA alert;cooperative  -KA       Patient Effort good  -SA good  -KA       Symptoms Noted During/After Treatment none  -SA none  -KA                General Information    Patient Profile Reviewed yes  -SA yes  -KA       Pertinent History Of Current Problem -- UTI sespis recent history of PNA. Patient with recent choking episode. Patient has been evaluated in the past with speech with recommendation for mechanical soft diet. GI note from this morning stated \"feel patients complaints are oropharyngeal component.\"  -KA       Current Method of Nutrition soft textures;thin liquids  -SA clear liquids  -KA       Precautions/Limitations, Vision WFL  -SA WFL  -KA       Precautions/Limitations, Hearing WFL  -SA WFL  -KA       Prior Level of Function-Communication WFL  -SA WFL  -KA       Prior Level of Function-Swallowing no diet consistency restrictions;esophageal concerns  - no diet consistency restrictions;esophageal concerns  -KA       Plans/Goals Discussed with patient  -SA patient  -KA       Barriers to Rehab medically complex  -SA medically complex  -KA       Patient's Goals for Discharge --  able to swallow all " "foods w/o sticking  - no concerns voiced  -                Pain    Additional Documentation Pain Scale: Numbers Pre/Post-Treatment (Group)  - --                Pain Scale: Numbers Pre/Post-Treatment    Pretreatment Pain Rating 0/10 - no pain  -SA --       Posttreatment Pain Rating 0/10 - no pain  -SA --                Oral Motor Structure and Function    Dentition Assessment -- natural, present and adequate  -       Secretion Management -- WNL/WFL  -       Mucosal Quality -- moist, healthy  -                Oral Musculature and Cranial Nerve Assessment    Oral Motor General Assessment -- WFL  -                General Eating/Swallowing Observations    Respiratory Support Currently in Use -- room air  -       Eating/Swallowing Skills -- self-fed  -       Positioning During Eating -- upright in bed  -       Utensils Used -- spoon;cup;straw  -       Consistencies Trialed -- soft textures;chopped;mixed consistency;pureed;thin liquids  -       Pre SpO2 (%) -- 98  -KA       Post SpO2 (%) -- 98  -KA                Clinical Swallow Eval    Clinical Swallow Evaluation Summary -- Patient demonstrated no overt s/s of pen/asp with thins via straw, puree and mechanical soft trials. Prolonged mastication with mechanical soft despite adequate mastication. Patient is vague with her swallowing symptoms reports she \"gags and coughs on food sometimes,\" and stated yes when SLP asked if food feels stuck.  -                MBS/VFSS Interpretation    Oral Prep Phase impaired oral phase of swallowing  -SA --       Oral Transit Phase impaired  -SA --       Oral Residue impaired  -SA --                Oral Preparatory Phase    Oral Preparatory Phase prolonged manipulation  -SA --       Prolonged Manipulation mixed consistency;mechanical soft;regular textures  -SA --                Oral Transit Phase    Impaired Oral Transit Phase premature spillage of liquids into pharynx  -SA --       Premature Spillage of Liquids " into Pharynx mixed consistency;mechanical soft  -SA --                Oral Residue    Impaired Oral Residue diffuse residue throughout oral cavity  -SA --       Diffuse Residue throughout Oral Cavity all consistencies tested;other (see comments)  increased w/ dry solids  -SA --       Response to Oral Residue unable to clear residue;with spontaneous subsequent swallow;with liquid wash  -SA --                Initiation of Pharyngeal Swallow    Pharyngeal Phase functional pharyngeal phase of swallowing  -SA --                Esophageal Phase    Esophageal Phase other (see comments)  w/ soft solids; slow clearance of lower esophagus; liquid wash to clear  -SA --                SLP Communication to Radiology    Summary Statement VFSS completed in conjunction with Dr Marino.  Patient demonstrates a mild to moderate oral dysphagia characterized by slow mastication and diffuse oral residue.  No penetration or aspiration observed.  Esophageal scan completed with soft solids which showed slow clearance of lower esophagus requiring liquid wash.  -SA --                SLP Evaluation Clinical Impression    SLP Swallowing Diagnosis mild-moderate;oral dysphagia;esophageal dysphagia  -SA R/O pharyngeal dysphagia;esophageal dysphagia  -KA       Functional Impact risk of aspiration/pneumonia  -SA risk of aspiration/pneumonia  -KA       Rehab Potential/Prognosis, Swallowing -- good, to achieve stated therapy goals  -KA       Swallow Criteria for Skilled Therapeutic Interventions Met no problems identified which require skilled intervention  -SA demonstrates skilled criteria  -KA                SLP Treatment Clinical Impressions    Care Plan Review -- evaluation/treatment results reviewed  -                Recommendations    Therapy Frequency (Swallow) PRN  -SA PRN  -KA       Predicted Duration Therapy Intervention (Days) until discharge  -SA until discharge  -       SLP Diet Recommendation other (see comments);puree;thin liquids   or per pt tolerance  - soft textures;ground;thin liquids  -       Recommended Diagnostics -- VFSS (MBS)  -KA       Recommended Precautions and Strategies upright posture during/after eating;small bites of food and sips of liquid  - upright posture during/after eating;small bites of food and sips of liquid  -       Oral Care Recommendations Oral Care BID/PRN  -SA Oral Care BID/PRN  -KA       SLP Rec. for Method of Medication Administration as tolerated  -SA meds whole;as tolerated  -       Monitor for Signs of Aspiration notify SLP if any concerns  -SA yes;notify SLP if any concerns  -       Anticipated Discharge Disposition (SLP) unknown  - unknown  -                Swallow Goals (SLP)    Oral Nutrition/Hydration Goal Selection (SLP) -- oral nutrition/hydration, SLP goal 1  -                Oral Nutrition/Hydration Goal 1 (SLP)    Oral Nutrition/Hydration Goal 1, SLP Patient will tolerate least restrictive diet without overt s/s of pen/asp  -SA Patient will tolerate least restrictive diet without overt s/s of pen/asp  -KA       Time Frame (Oral Nutrition/Hydration Goal 1, SLP) short term goal (STG);by discharge  - short term goal (STG);by discharge  -             User Key  (r) = Recorded By, (t) = Taken By, (c) = Cosigned By    Initials Name Effective Dates    Magnolia Mock MS CCC-SLP 06/16/21 -     Winston Bryant MA,[facility]-SLP 06/16/21 -                 EDUCATION  The patient has been educated in the following areas:   Dysphagia (Swallowing Impairment) Modified Diet Instruction.        SLP GOALS     Row Name 04/04/22 1100 04/02/22 1136          Oral Nutrition/Hydration Goal 1 (SLP)    Oral Nutrition/Hydration Goal 1, SLP Patient will tolerate least restrictive diet without overt s/s of pen/asp  -SA Patient will tolerate least restrictive diet without overt s/s of pen/asp  -KA     Time Frame (Oral Nutrition/Hydration Goal 1, SLP) short term goal (STG);by discharge  - short term goal  (STG);by discharge  -           User Key  (r) = Recorded By, (t) = Taken By, (c) = Cosigned By    Initials Name Provider Type    Magnolia Mock MS CCC-SLP Speech and Language Pathologist    Winston Bryant MA,CCC-SLP Speech and Language Pathologist              SLP Outcome Measures (last 72 hours)     SLP Outcome Measures     Row Name 04/04/22 1100 04/02/22 1200          SLP Outcome Measures    Outcome Measure Used? Adult NOMS  -SA Adult NOMS  -KA            Adult FCM Scores    FCM Chosen -- Swallowing  -KA     Swallowing FCM Score 4  -SA 5  -KA           User Key  (r) = Recorded By, (t) = Taken By, (c) = Cosigned By    Initials Name Effective Dates    Magnolia Mock MS CCC-SLP 06/16/21 -     Winston Bryant MA,CCC-SLP 06/16/21 -                  Time Calculation:    Time Calculation- SLP     Row Name 04/04/22 1551             Time Calculation- SLP    SLP Start Time 1100  -      SLP Received On 04/04/22  -            User Key  (r) = Recorded By, (t) = Taken By, (c) = Cosigned By    Initials Name Provider Type    Magnolia Mock MS CCC-SLP Speech and Language Pathologist                Therapy Charges for Today     Code Description Service Date Service Provider Modifiers Qty    [id number] HC ST MOTION FLUORO EVAL SWALLOW 5 4/4/2022 Magnolia Knight MS CCC-SLP GN 1               Magnolia Knight MS CCC-CARLOS  4/4/2022

## 2022-04-04 NOTE — PROGRESS NOTES
Adult Nutrition  Assessment/PES    Patient Name:  Gisela Gandara  YOB: 1932  MRN: [id number]  Admit Date:  4/1/2022    Assessment Date:  4/4/2022    Comments:  Nutrition assessment triggered by MST score of 2 per nurse admission screen and chart skin report.  Admitted with acute UTI, weakness, dysphagia.  Healing PI noted to coccyx.  DNR.      Per chart review, patient had choking episode.  S/p VFSS today - SLP recommended diet based on patient tolerance.  SLP recommended pureed diet or full liquids with ONS.      Based on previous admission, do not believe patient will meet nutrition needs on full liquid diet alone, even with oral nutrition supplements.  Recommend consideration of pureed diet and/or tube feeding in order to meet estimated nutrition needs.    Adding Boost GC TID.  RD to continue to follow.     Reason for Assessment     Row Name 04/04/22 1503          Reason for Assessment    Reason For Assessment identified at risk by screening criteria     Diagnosis cardiac disease;other (see comments);diabetes diagnosis/complications;gastrointestinal disease;infection/sepsis  PAD, R AKA, DM, HTN, HLD, GERD; adm with acute UTI, weakness, dysphagia     Identified At Risk by Screening Criteria MST SCORE 2+;large or nonhealing wound, burn or pressure injury                Nutrition/Diet History     Row Name 04/04/22 1503          Nutrition/Diet History    Typical Intake (Food/Fluid/EN/PN) changed to full liquid diet today, no intake available     Supplemental Drinks/Foods/Additives adding Boost GC TID                  Labs/Tests/Procedures/Meds     Row Name 04/04/22 1504          Labs/Procedures/Meds    Lab Results Reviewed reviewed, pertinent     Lab Results Comments Gluc, Na, Creat, Ca, Hgb, Hct            Diagnostic Tests/Procedures    Diagnostic Test/Procedure Reviewed reviewed, pertinent     Diagnostic Test/Procedures Comments s/p SLP 4/2 and s/p VFSS today            Medications    Pertinent  Medications Reviewed reviewed, pertinent     Pertinent Medications Comments admelog, protonix                Physical Findings     Row Name 04/04/22 1505          Physical Findings    Overall Physical Appearance B=13, coccyx healing PI; overweight                  Nutrition Prescription Ordered     Row Name 04/04/22 1507          Nutrition Prescription PO    Current PO Diet Full Liquid                Evaluation of Received Nutrient/Fluid Intake     Row Name 04/04/22 1507          PO Evaluation    Number of Days PO Intake Evaluated Insufficient Data                     Problem/Interventions:   Problem 1     Row Name 04/04/22 1507          Nutrition Diagnoses Problem 1    Problem 1 Increased Nutrient Needs     Macronutrient Kcal;Protein     Etiology (related to) Medical Diagnosis     Skin Pressure injury     Signs/Symptoms (evidenced by) Report/Observation                      Intervention Goal     Row Name 04/04/22 1509          Intervention Goal    General Maintain nutrition;Disease management/therapy;Meet nutritional needs for age/condition     PO Establish PO;Tolerate PO;PO intake (%)     PO Intake % 75 %     Weight No significant weight loss                Nutrition Intervention     Row Name 04/04/22 1510          Nutrition Intervention    RD/Tech Action Follow Tx progress;Care plan reviewd;Recommend/ordered     Recommended/Ordered Supplement                Nutrition Prescription     Row Name 04/04/22 1510          Nutrition Prescription PO    PO Prescription Begin/change supplement     Supplement Boost Glucose Control     Supplement Frequency 3 times a day     New PO Prescription Ordered? Yes                Education/Evaluation     Row Name 04/04/22 1511          Education    Education Will Instruct as appropriate            Monitor/Evaluation    Monitor Per protocol;PO intake;Supplement intake;Pertinent labs;Weight;Skin status;Symptoms                 Electronically signed by:  Marilia Low RD  04/04/22 15:11  EDT

## 2022-04-04 NOTE — DISCHARGE PLACEMENT REQUEST
"Marcella Oliver (89 y.o. Female)             Date of Birth   01/28/1932    Social Security Number       Address   [address] [id number]    Home Phone   [phone]    MRN   [id number]       Mosque   None    Marital Status                               Admission Date   4/1/22    Admission Type   Emergency    Admitting Provider   Rigoberto Law MD    Attending Provider   Rigoberto Law MD    Department, Room/Bed   [address], [address]/1       Discharge Date       Discharge Disposition       Discharge Destination                               Attending Provider: Rigoberto Law MD    Allergies: No Known Allergies    Isolation: Contact   Infection: MRSA/History Only (04/03/22), Bety Auris (rule out) (04/03/22)   Code Status: No CPR   Advance Care Planning Activity    Ht: 157.5 cm (62.01\")   Wt: 69.3 kg (152 lb 12.5 oz)    Admission Cmt: None   Principal Problem: None                Active Insurance as of 4/1/2022     Primary Coverage     Payor Plan Insurance Group Employer/Plan Group    ANTHEM MEDICARE REPLACEMENT ANTHEM MEDICARE ADVANTAGE [id number]     Payor Plan Address Payor Plan Phone Number Payor Plan Fax Number Effective Dates    [address] [phone]  1/1/2018 - None Entered    [facility] [address]       Subscriber Name Subscriber Birth Date Member ID       MARCELLA OLIVER 1/28/1932 [id number]           Secondary Coverage     Payor Plan Insurance Group Employer/Plan Group    [place]      Payor Plan Address Payor Plan Phone Number Payor Plan Fax Number Effective Dates    [address] [phone]  11/18/2020 - None Entered    [facility] [id number]       Subscriber Name Subscriber Birth Date Member ID       MARCELLA OLIVER JOSÉ MIGUEL 1/28/1932 [id number]                 Emergency Contacts      (Rel.) Home Phone Work Phone Mobile Phone    JAYDEN OLIVER (Daughter) [phone] -- [phone]    " KAMILLE VALENCIA (Daughter) [phone] -- [phone]

## 2022-04-04 NOTE — CASE MANAGEMENT/SOCIAL WORK
Discharge Planning Assessment  [facility]     Patient Name: Gisela Gandara  MRN: [id number]  Today's Date: 4/4/2022    Admit Date: 4/1/2022     Discharge Needs Assessment     Row Name 04/04/22 0843       Living Environment    People in Home facility resident    Current Living Arrangements extended care facility    Provides Primary Care For no one, unable/limited ability to care for self    Family Caregiver if Needed child(trent), adult    Quality of Family Relationships supportive    Able to Return to Prior Arrangements yes       Resource/Environmental Concerns    Resource/Environmental Concerns none       Transition Planning    Patient/Family Anticipates Transition to long-term care facility    Patient/Family Anticipated Services at Transition skilled nursing    Transportation Anticipated health plan transportation       Discharge Needs Assessment    Equipment Currently Used at Home wheelchair;hospital bed    Concerns to be Addressed discharge planning    Anticipated Changes Related to Illness none    Equipment Needed After Discharge none    Provided Post Acute Provider List? N/A    Provided Post Acute Provider Quality & Resource List? N/A               Discharge Plan     Row Name 04/04/22 0844       Plan    Plan Plan is to return to IC level bed a [facility].    Plan Comments S/W Kofi/ [facility] - pt is from an IC level bed and can return upon DC.  CCP s/w pt who confirms she lives at [facility] and plans to return.  CCP confirmed w/ pt's dtr Pam that the plan is to return to [facility] upon DC.  Pt is nonambulatory and will need EMS transport. Packet for [facility] started. ............Magnolia HAYES/ BRENDA              Continued Care and Services - Admitted Since 4/1/2022     Destination Coordination complete.    Service Provider Request Status Selected Services Address Phone Fax Patient Preferred    [facility]   Selected Intermediate Care [address] [place] [address]  [phone] --            Selected Continued Care - Prior Encounters Includes selections from prior encounters from 1/1/2022 to 4/4/2022    Discharged on 3/24/2022 Admission date: 3/16/2022 - Discharge disposition: Intermediate Care     Destination     Service Provider Selected Services Address Phone Fax Patient Preferred    [facility]  Intermediate Care [address] [place] [address] [phone] --                       Demographic Summary     Row Name 04/04/22 0842       General Information    Admission Type observation    Arrived From [facility]    Required Notices Provided Observation Status Notice    Referral Source admission list    Reason for Consult discharge planning    Preferred Language English               Functional Status     Row Name 04/04/22 0842       Functional Status    Usual Activity Tolerance fair    Current Activity Tolerance fair       Functional Status, IADL    Medications completely dependent    Meal Preparation completely dependent    Housekeeping completely dependent    Laundry completely dependent    Shopping completely dependent       Mental Status    General Appearance WDL WDL                         Magnolia Bosch RN

## 2022-04-04 NOTE — PROGRESS NOTES
"Daily progress note    Chief complaint  Doing same  Still with difficulty swallowing  No new complaints  Denies any nausea vomiting abdominal pain diarrhea    History of present illness  89-year-old -American female who is well-known to our service with history of peripheral artery disease status post right AKA who also have diabetes mellitus hypertension hyperlipidemia gastroesophageal reflux disease who has been immobilized and was recently treated for UTI pneumonia and discharged back to nursing home in stable condition sent to the ER that she has had a choking episode and unable to eat with generalized weakness.  Patient evaluated in ER admit for further work-up.  Patient fully alert oriented denies any fever cough chest pain increase shortness of breath abdominal pain nausea vomiting diarrhea.     REVIEW OF SYSTEMS  Unremarkable except generalized weakness     PHYSICAL EXAM  Blood pressure 177/63, pulse 67, temperature 98.3 °F (36.8 °C), temperature source Oral, resp. rate 16, height 157.5 cm (62.01\"), weight 69.3 kg (152 lb 12.5 oz), SpO2 96 %.    GENERAL: Awake and alert, no acute distress  HENT: nares patent, oropharynx clear, dentures present  EYES: no scleral icterus, EOMI  CV: regular rhythm, normal rate, no murmur appreciated  RESPIRATORY: normal effort, lungs clear auscultation bilaterally  ABDOMEN: soft, nondistended, nontender throughout  MUSCULOSKELETAL: Status post right AKA, left lower extremity is normal to inspection  NEURO: alert, moves all extremities, follows commands  PSYCH:  calm, cooperative  SKIN: warm, dry     LAB RESULTS   Lab Results (last 24 hours)     Procedure Component Value Units Date/Time    CANDIDA AURIS SCREEN - Swab, Axilla Right, Axilla Left and Groin [[id number]]  (Normal) Collected: 04/03/22 1407    Specimen: Swab from Axilla Right, Axilla Left and Groin Updated: 04/04/22 1433     Candida Auris Screen Culture No Candida auris isolated at 24 hours    POC Glucose Once " [[id number]]  (Abnormal) Collected: 04/04/22 1058    Specimen: Blood Updated: 04/04/22 1059     Glucose 162 mg/dL      Comment: Meter: [id number] : [id number] Valerie Hale RN       Basic Metabolic Panel [[id number]]  (Abnormal) Collected: 04/04/22 0821    Specimen: Blood Updated: 04/04/22 0915     Glucose 147 mg/dL      BUN 14 mg/dL      Creatinine 1.04 mg/dL      Sodium 135 mmol/L      Potassium 5.1 mmol/L      Chloride 106 mmol/L      CO2 19.1 mmol/L      Calcium 7.8 mg/dL      BUN/Creatinine Ratio 13.5     Anion Gap 9.9 mmol/L      eGFR 51.2 mL/min/1.73      Comment: National Kidney Foundation and American Society of Nephrology (ASN) Task Force recommended calculation based on the Chronic Kidney Disease Epidemiology Collaboration (CKD-EPI) equation refit without adjustment for race.       Narrative:      GFR Normal >60  Chronic Kidney Disease <60  Kidney Failure <15      CBC & Differential [[id number]]  (Abnormal) Collected: 04/04/22 0821    Specimen: Blood Updated: 04/04/22 0851    Narrative:      The following orders were created for panel order CBC & Differential.  Procedure                               Abnormality         Status                     ---------                               -----------         ------                     CBC Auto Differential[[id number]]        Abnormal            Final result                 Please view results for these tests on the individual orders.    CBC Auto Differential [[id number]]  (Abnormal) Collected: 04/04/22 0821    Specimen: Blood Updated: 04/04/22 0851     WBC 9.67 10*3/mm3      RBC 3.36 10*6/mm3      Hemoglobin 9.8 g/dL      Hematocrit 29.2 %      MCV 86.9 fL      MCH 29.2 pg      MCHC 33.6 g/dL      RDW 15.1 %      RDW-SD 47.5 fl      MPV 9.8 fL      Platelets 404 10*3/mm3      Neutrophil % 48.9 %      Lymphocyte % 37.1 %      Monocyte % 11.2 %      Eosinophil % 1.7 %      Basophil % 0.5 %      Immature Grans % 0.6 %      Neutrophils, Absolute 4.73 10*3/mm3       Lymphocytes, Absolute 3.59 10*3/mm3      Monocytes, Absolute 1.08 10*3/mm3      Eosinophils, Absolute 0.16 10*3/mm3      Basophils, Absolute 0.05 10*3/mm3      Immature Grans, Absolute 0.06 10*3/mm3      nRBC 0.0 /100 WBC     POC Glucose Once [[id number]]  (Abnormal) Collected: 04/04/22 0621    Specimen: Blood Updated: 04/04/22 0635     Glucose 139 mg/dL      Comment: Meter: [id number] : [id number] Morrell Orin NA       POC Glucose Once [[id number]]  (Normal) Collected: 04/03/22 2228    Specimen: Blood Updated: 04/03/22 2234     Glucose 120 mg/dL      Comment: Meter: [id number] : [id number] Morrell Orin NA       POC Glucose Once [[id number]]  (Abnormal) Collected: 04/03/22 1552    Specimen: Blood Updated: 04/03/22 1554     Glucose 162 mg/dL      Comment: Meter: [id number] : [id number] Porfirio CA           Imaging Results (Last 24 Hours)     Procedure Component Value Units Date/Time    FL Video Swallow With Speech Single Contrast [[id number]] Resulted: 04/04/22 1110     Updated: 04/04/22 1129          Current Facility-Administered Medications:   •  acetaminophen (TYLENOL) tablet 650 mg, 650 mg, Oral, Q6H PRN, Rigoberto Law MD, 650 mg at 04/04/22 0404  •  amLODIPine (NORVASC) tablet 10 mg, 10 mg, Oral, Q24H, Rigoberto Law MD, 10 mg at 04/04/22 0946  •  aspirin EC tablet 81 mg, 81 mg, Oral, Daily, Rigoberto Law MD, 81 mg at 04/04/22 0946  •  cloNIDine (CATAPRES) tablet 0.2 mg, 0.2 mg, Oral, Q8H, Rigoberto Law MD, 0.2 mg at 04/04/22 1337  •  fluconazole (DIFLUCAN) tablet 100 mg, 100 mg, Oral, Q24H, Rigoberto Law MD, 100 mg at 04/04/22 1339  •  FLUoxetine (PROzac) capsule 20 mg, 20 mg, Oral, Daily, Rigoberto Law MD, 20 mg at 04/04/22 0946  •  gabapentin (NEURONTIN) capsule 100 mg, 100 mg, Oral, Nightly, Rigoberto Law MD, 100 mg at 04/03/22 2105  •  hydrALAZINE (APRESOLINE) tablet 100 mg, 100 mg, Oral, Q8H, Rigoberto Law MD, 100 mg at 04/04/22 1337  •  HYDROcodone-acetaminophen (NORCO) 5-325 MG per tablet  1 tablet, 1 tablet, Oral, Q6H PRN, Vance Law MD, 1 tablet at 04/04/22 1249  •  hydrocortisone-bacitracin-zinc oxide-nystatin (MAGIC BARRIER) ointment 1 application, 1 application, Topical, PRN, Vance Law MD  •  insulin lispro (ADMELOG) injection 0-7 Units, 0-7 Units, Subcutaneous, 4x Daily With Meals & Nightly, Vance Law MD, 2 Units at 04/04/22 1249  •  melatonin tablet 3 mg, 3 mg, Oral, Nightly PRN, Vance Law MD  •  ondansetron (ZOFRAN) injection 4 mg, 4 mg, Intravenous, Q6H PRN, Vance Law MD  •  pantoprazole (PROTONIX) EC tablet 40 mg, 40 mg, Oral, BID AC, Vance Law MD, 40 mg at 04/04/22 0946  •  potassium chloride (KLOR-CON) packet 20 mEq, 20 mEq, Oral, BID With Meals, Vance Law MD, 20 mEq at 04/04/22 0946  •  pravastatin (PRAVACHOL) tablet 10 mg, 10 mg, Oral, Nightly, Vance Law MD, 10 mg at 04/03/22 2104  •  [COMPLETED] Insert peripheral IV, , , Once **AND** sodium chloride 0.9 % flush 10 mL, 10 mL, Intravenous, PRN, [place], Waldo MAE MD  •  valsartan (DIOVAN) tablet 320 mg, 320 mg, Oral, Q24H, Vance Law MD, 320 mg at 04/04/22 0948     ASSESSMENT  Oropharyngeal dysphagia  Persistent nausea vomiting resolved  Acute yeast UTI  Recent aspiration pneumonia  Diabetes mellitus  Hypertension  Hyperlipidemia  Peripheral artery disease status post right AKA  Immobilization syndrome  Gastroesophageal reflux disease    PLAN  CPM  Clear liquid diet  Check video swallow study   Diflucan for 5 days  GI consult appreciated  Continue nursing home medications  Stress ulcer DVT prophylaxis  Supportive care  DNR  PT/OT  Discussed with family staff and gastroenterology  Follow closely further recommendation current hospital course    VANCE LAW MD

## 2022-04-04 NOTE — PROGRESS NOTES
[facility]  Inpatient Progress Note    Reason for Follow-up: Choking episode, unable to eat    Subjective     Interval History:   Spoke with patient and she reports food sticking and points to her lower cervical region.  She states this only happens with food she has no trouble with liquids.  She takes her pills one by one and this seems to do well.  When asked what type of foods are getting stuck she states all of it.  She denies vomiting up food but does admit to choking and coughing with eating.  Reports this is a more recent problem and not long-term.    SLP note indicates mild to moderate oral dysphagia.  Aspiration.  Esophageal scan with delayed clearance of lower esophagus with soft solids but cleared with liquid wash.    Pending full report from [facility]      Current Facility-Administered Medications:   •  acetaminophen (TYLENOL) tablet 650 mg, 650 mg, Oral, Q6H PRN, Rigoberto Law MD, 650 mg at 04/04/22 0404  •  aluminum sulfate-calcium acetate (DOMEBORO) topical packet 1 packet, 1 packet, Topical, Q8H, Rigoberto Law MD  •  amLODIPine (NORVASC) tablet 10 mg, 10 mg, Oral, Q24H, Rigoberto Law MD, 10 mg at 04/04/22 0946  •  aspirin EC tablet 81 mg, 81 mg, Oral, Daily, Rigoberto Law MD, 81 mg at 04/04/22 0946  •  cloNIDine (CATAPRES) tablet 0.2 mg, 0.2 mg, Oral, Q8H, Rigoberto Law MD, 0.2 mg at 04/04/22 1337  •  fluconazole (DIFLUCAN) tablet 100 mg, 100 mg, Oral, Q24H, Rigoberto Law MD, 100 mg at 04/04/22 1339  •  FLUoxetine (PROzac) capsule 20 mg, 20 mg, Oral, Daily, Rigoberto Law MD, 20 mg at 04/04/22 0946  •  gabapentin (NEURONTIN) capsule 100 mg, 100 mg, Oral, Nightly, Rigoberto Law MD, 100 mg at 04/03/22 2105  •  hydrALAZINE (APRESOLINE) tablet 100 mg, 100 mg, Oral, Q8H, Rigoberto Law MD, 100 mg at 04/04/22 1337  •  HYDROcodone-acetaminophen (NORCO) 5-325 MG per tablet 1 tablet, 1 tablet, Oral, Q6H PRN, Rigoberto Law MD, 1 tablet at 04/04/22 1249  •  hydrocortisone-bacitracin-zinc  oxide-nystatin (MAGIC BARRIER) ointment 1 application, 1 application, Topical, PRN, Rigoberto Law MD  •  insulin lispro (ADMELOG) injection 0-7 Units, 0-7 Units, Subcutaneous, 4x Daily With Meals & Nightly, Rigoberto Law MD, 2 Units at 04/04/22 1249  •  melatonin tablet 3 mg, 3 mg, Oral, Nightly PRN, Rigoberto Law MD  •  ondansetron (ZOFRAN) injection 4 mg, 4 mg, Intravenous, Q6H PRN, Rigoberto Law MD  •  pantoprazole (PROTONIX) EC tablet 40 mg, 40 mg, Oral, BID AC, Rigoberto Law MD, 40 mg at 04/04/22 0946  •  pravastatin (PRAVACHOL) tablet 10 mg, 10 mg, Oral, Nightly, Rigoberto Law MD, 10 mg at 04/03/22 2104  •  [COMPLETED] Insert peripheral IV, , , Once **AND** sodium chloride 0.9 % flush 10 mL, 10 mL, Intravenous, PRN, [place], Waldo MAE MD  •  valsartan (DIOVAN) tablet 320 mg, 320 mg, Oral, Q24H, Rigoberto Law MD, 320 mg at 04/04/22 0948  Review of Systems:    The following systems were reviewed and negative;  ENT and cardiovascular    Objective     Vital Signs  Temp:  [97.8 °F (36.6 °C)-98.4 °F (36.9 °C)] 98.3 °F (36.8 °C)  Heart Rate:  [61-74] 67  Resp:  [16-18] 16  BP: (128-181)/(51-72) 177/63  Body mass index is 27.94 kg/m².    Intake/Output Summary (Last 24 hours) at 4/4/2022 1642  Last data filed at 4/4/2022 1329  Gross per 24 hour   Intake 150 ml   Output --   Net 150 ml     I/O this shift:  In: 150 [P.O.:150]  Out: -      Physical Exam:    General: patient awake, alert and cooperative   Eyes: Normal lids and lashes, no scleral icterus   Neck: supple, normal ROM   Skin: warm and dry, not jaundiced   Pulm: regular and unlabored   Abdomen: soft, nontender, nondistended; normal bowel sounds   Psychiatric: Normal mood and behavior; memory intact     Results Review:     I reviewed the patient's new clinical results.    Results from last 7 days   Lab Units 04/04/22  0821 04/03/22  0722 04/02/22  0559   WBC 10*3/mm3 9.67 10.96* 9.12   HEMOGLOBIN g/dL 9.8* 10.0* 9.8*   HEMATOCRIT % 29.2* 30.1* 29.7*   PLATELETS  10*3/mm3 404 415 388     Results from last 7 days   Lab Units 04/04/22  0821 04/03/22  0722 04/02/22  0559 04/01/22  1503   SODIUM mmol/L 135* 136 138 138   POTASSIUM mmol/L 5.1 4.6 3.8 4.1   CHLORIDE mmol/L 106 107 108* 105   CO2 mmol/L 19.1* 19.0* 20.0* 17.0*   BUN mg/dL 14 12 10 11   CREATININE mg/dL 1.04* 1.06* 0.78 0.93   CALCIUM mg/dL 7.8* 7.9* 7.8* 8.4   BILIRUBIN mg/dL  --   --   --  0.4   ALK PHOS U/L  --   --   --  218*   ALT (SGPT) U/L  --   --   --  30   AST (SGOT) U/L  --   --   --  28   GLUCOSE mg/dL 147* 155* 163* 214*         Lab Results   Lab Value Date/Time    LIPASE 54 04/01/2022 1503    LIPASE 36 03/16/2022 1536       Radiology:  FL Video Swallow With Speech Single Contrast   Final Result   Fluoroscopy was provided for the speech pathologist during a   video swallow study. For full details please see the speech pathology   report.       This report was finalized on 4/4/2022 3:25 PM by Dr. Marissa Rondon M.D.          FL Esophagram Complete Single Contrast   Final Result          Assessment/Plan     Patient Active Problem List   Diagnosis   • Diabetic ketoacidosis without coma associated with type 2 diabetes mellitus (HCC)   • Pneumonia of left lower lobe due to infectious organism   • Acute UTI       Assessment:  1. Dysphagia of unclear etiology: Possibly multifactorial as she has coughing/choking and mild to moderate oral dysphagia noted on SLP exam.  Also noted slowing of food in lower esophagus which was subsequently cleared with liquids.  2. History of pneumonia  3. History of UTI      Plan:  · Discussed findings of speech-language pathology consult and video swallow with patient.  Discussed possibly proceeding with EGD to evaluate for stricture and may benefit from possible dilation if this is the case.  Discussed risk versus benefits particularly risks of anesthesia given her age.  She states her family wants her to have the EGD and she was noncommittal either way.  We did discuss that if  the trouble swallowing with causing decreased intake, that this might be a good reason to proceed with EGD.  However if she is still able to eat, the risk may outweigh the benefits.  We will give her some time to think about this and how she wants to proceed.  I did discuss this with Dr. Bell as well and she agrees with above.  · We will follow-up with patient tomorrow to yvrose.  · Requested via nursing order to document how much of her food she is eating at each meal to determine whether trouble swallowing is affecting her eating.  Ordered x3 days.    I discussed the patients findings and my recommendations with patient.    Dragon dictation used throughout this note.            Agnes Villatoro PA-C  [facility]  [address] [place] [address]  Office: [phone]

## 2022-04-04 NOTE — SIGNIFICANT NOTE
04/04/22 0755   OTHER   Discipline occupational therapist   Rehab Time/Intention   Session Not Performed other (see comments)  (Per chart review, pt is dependent for bed mobility and ADL's at baseline from her nursing home staff. OT to s/o.)

## 2022-04-04 NOTE — PLAN OF CARE
Goal Outcome Evaluation:  Plan of Care Reviewed With: patient           Outcome Evaluation: Diet order changed to puree/thin w/ extra gravy/sauce. Discussed with pt d/t lack of findings on VFSS despite clinical s/s bedside. If pt continues to exhibit gagging or refuses puree may change diet based on pt tolerance.  Pt denied traumatic swallow event prior to gagging; however, did report family trauma of loss of daughter though did not coordinate timing of these two events.  Question if possible psyc component.

## 2022-04-04 NOTE — PLAN OF CARE
Goal Outcome Evaluation:  Plan of Care Reviewed With: patient           Outcome Evaluation: VFSS completed.  Pt demonstrates amild to moderate oral dysphagia characterized by slow mastication and moderate diffuse oral residue of puree and solids.  Unable to clear dry solid residue with liquid wash.  No aspiration observed with any consistency.  Minimal pharyngeal residue seen.  Esophageal scan showed delayed clearance of lower esophagus with soft solids, but cleared with a liquid wash.  Unable to determine cause of pt's complaints.  REC diet based on pt tolerance.  Pt states she can swallow applesauce, but mashed potatoes can 'stick', but also reports she 'can't swallow' out of her mouth.  Given reports of choking on soft, recommend consideration of puree or full liquids with management of nutrtion with supplements.

## 2022-04-05 NOTE — NURSING NOTE
I spoke with Marleny Ortega, RN with cardiology service. Provided report on pt, and reason for consult. RN states that she will review EKG, and provide all information to Dr. Leal and will call night shift RN back with any further recommendations.

## 2022-04-05 NOTE — PROGRESS NOTES
[facility]  Inpatient Progress Note    Reason for Follow-up: Choking episode, unable to eat    Subjective     Interval History:   Patient reports that she woke up in the middle night choking/gagging.  She denies nausea and vomiting.  Her diet was switched to purées and she feels like she can get this down without choking/gagging or food sticking.  She does not have any trouble with liquids.    I spoke with her nurse who states that she is eating very little of her food.  She ate none of her breakfast.  The nurse does say that when she is in there helping her eat, the patient does not appear to have any trouble with choking.    Current Facility-Administered Medications:   •  acetaminophen (TYLENOL) tablet 650 mg, 650 mg, Oral, Q6H PRN, Rigoberto Law MD, 650 mg at 04/05/22 0829  •  aluminum sulfate-calcium acetate (DOMEBORO) topical packet 1 packet, 1 packet, Topical, Q8H, Rigoberto Law MD, 1 packet at 04/05/22 0830  •  amLODIPine (NORVASC) tablet 10 mg, 10 mg, Oral, Q24H, Rigoberto Law MD, 10 mg at 04/05/22 0829  •  aspirin EC tablet 81 mg, 81 mg, Oral, Daily, Rigoberto Law MD, 81 mg at 04/05/22 0829  •  cloNIDine (CATAPRES) tablet 0.2 mg, 0.2 mg, Oral, Q8H, Rigoberto Law MD, 0.2 mg at 04/05/22 0625  •  fluconazole (DIFLUCAN) tablet 100 mg, 100 mg, Oral, Q24H, Rigoberto Law MD, 100 mg at 04/04/22 1339  •  FLUoxetine (PROzac) capsule 20 mg, 20 mg, Oral, Daily, Rigoberto Law MD, 20 mg at 04/05/22 0829  •  gabapentin (NEURONTIN) capsule 100 mg, 100 mg, Oral, Nightly, Rigoberto Law MD, 100 mg at 04/04/22 2141  •  hydrALAZINE (APRESOLINE) tablet 100 mg, 100 mg, Oral, Q8H, Rigoberto Law MD, 100 mg at 04/05/22 0625  •  HYDROcodone-acetaminophen (NORCO) 5-325 MG per tablet 1 tablet, 1 tablet, Oral, Q6H PRN, Rigoberto Law MD, 1 tablet at 04/05/22 0402  •  hydrocortisone-bacitracin-zinc oxide-nystatin (MAGIC BARRIER) ointment 1 application, 1 application, Topical, PRN, Rigoberto Law MD, 1 application  at 04/05/22 0435  •  insulin lispro (ADMELOG) injection 0-7 Units, 0-7 Units, Subcutaneous, 4x Daily With Meals & Nightly, Rigoberto Law MD, 2 Units at 04/05/22 0829  •  melatonin tablet 3 mg, 3 mg, Oral, Nightly PRN, Rigoberto Law MD  •  ondansetron (ZOFRAN) injection 4 mg, 4 mg, Intravenous, Q6H PRN, Rigoberto Law MD, 4 mg at 04/05/22 0425  •  pantoprazole (PROTONIX) EC tablet 40 mg, 40 mg, Oral, BID AC, Rigoberto Law MD, 40 mg at 04/05/22 0828  •  pravastatin (PRAVACHOL) tablet 10 mg, 10 mg, Oral, Nightly, Rigoberto Law MD, 10 mg at 04/04/22 2139  •  prochlorperazine (COMPAZINE) injection 10 mg, 10 mg, Intravenous, Q6H PRN, Rigoberto Law MD, 10 mg at 04/05/22 0828  •  [COMPLETED] Insert peripheral IV, , , Once **AND** sodium chloride 0.9 % flush 10 mL, 10 mL, Intravenous, PRN, [place]  •  valsartan (DIOVAN) tablet 320 mg, 320 mg, Oral, Q24H, Rigoberto Law MD, 320 mg at 04/05/22 0829  Review of Systems:    The following systems were reviewed and negative;  ENT and respiratory    Objective     Vital Signs  Temp:  [97.5 °F (36.4 °C)-99.2 °F (37.3 °C)] 99.2 °F (37.3 °C)  Heart Rate:  [] 80  Resp:  [14-17] 14  BP: (135-185)/(49-89) 142/58  Body mass index is 27.94 kg/m².    Intake/Output Summary (Last 24 hours) at 4/5/2022 1321  Last data filed at 4/4/2022 2139  Gross per 24 hour   Intake 510 ml   Output --   Net 510 ml     No intake/output data recorded.     Physical Exam:    General: patient awake, alert and cooperative   Eyes: Normal lids and lashes, no scleral icterus   Neck: supple, normal ROM   Skin: warm and dry, not jaundiced   Pulm: regular and unlabored   Abdomen: soft, nontender, nondistended; normal bowel sounds   Psychiatric: Normal mood and behavior; memory intact     Results Review:     I reviewed the patient's new clinical results.    Results from last 7 days   Lab Units 04/05/22  0642 04/04/22  0821 04/03/22  0722   WBC 10*3/mm3 13.22* 9.67 10.96*   HEMOGLOBIN g/dL 10.9* 9.8* 10.0*    HEMATOCRIT % 33.4* 29.2* 30.1*   PLATELETS 10*3/mm3 461* 404 415     Results from last 7 days   Lab Units 04/05/22  0641 04/04/22  0821 04/03/22  0722 04/02/22  0559 04/01/22  1503   SODIUM mmol/L 136 135* 136   < > 138   POTASSIUM mmol/L 5.2 5.1 4.6   < > 4.1   CHLORIDE mmol/L 106 106 107   < > 105   CO2 mmol/L 16.2* 19.1* 19.0*   < > 17.0*   BUN mg/dL 11 14 12   < > 11   CREATININE mg/dL 0.83 1.04* 1.06*   < > 0.93   CALCIUM mg/dL 8.4 7.8* 7.9*   < > 8.4   BILIRUBIN mg/dL  --   --   --   --  0.4   ALK PHOS U/L  --   --   --   --  218*   ALT (SGPT) U/L  --   --   --   --  30   AST (SGOT) U/L  --   --   --   --  28   GLUCOSE mg/dL 193* 147* 155*   < > 214*    < > = values in this interval not displayed.         Lab Results   Lab Value Date/Time    LIPASE 54 04/01/2022 1503    LIPASE 36 03/16/2022 1536       Radiology:  FL Video Swallow With Speech Single Contrast   Final Result   Fluoroscopy was provided for the speech pathologist during a   video swallow study. For full details please see the speech pathology   report.       This report was finalized on 4/4/2022 3:25 PM by Dr. Marissa Rondon M.D.          FL Esophagram Complete Single Contrast   Final Result          Assessment/Plan     Patient Active Problem List   Diagnosis   • Diabetic ketoacidosis without coma associated with type 2 diabetes mellitus (HCC)   • Pneumonia of left lower lobe due to infectious organism   • Acute UTI       Assessment:  1. Dysphagia of unclear etiology with complaints of choking/gagging  2. History of pneumonia  3. History of UTI      Plan:  · Had conversation with patient regarding her choking and trouble swallowing.  She states that she does better on the puréed diet and does not have any trouble swallowing with this.  She did have an episode of choking/gagging in the middle the night.  · Again discussed EGD evaluation however I feel that the risks of the procedure given her age and medical status would outweigh the benefits at  this time.  She has had imaging work-up of her esophagus without any significant findings.  · Since she is able to tolerate a puréed diet, I would recommend she continue this.  If her symptoms persist or worsen, we could reconsider EGD.  If patient feels strongly about proceeding with EGD we can also proceed.  At this time she agrees to work on puréed diet.  · She did have a choking gagging episode in the middle night--- could she has sleep apnea?  · Okay to continue PPI--- okay to discharge on home med of omeprazole 20 mg twice daily.    I discussed the patients findings and my recommendations with patient and nursing staff.    Dragon dictation used throughout this note.            Agnes Villatoro PA-C  [facility]  [address] [place] [address]  Office: [phone]

## 2022-04-05 NOTE — PROGRESS NOTES
"Daily progress note    Chief complaint  Doing same  No new complaints    History of present illness  89-year-old -American female who is well-known to our service with history of peripheral artery disease status post right AKA who also have diabetes mellitus hypertension hyperlipidemia gastroesophageal reflux disease who has been immobilized and was recently treated for UTI pneumonia and discharged back to nursing home in stable condition sent to the ER that she has had a choking episode and unable to eat with generalized weakness.  Patient evaluated in ER admit for further work-up.  Patient fully alert oriented denies any fever cough chest pain increase shortness of breath abdominal pain nausea vomiting diarrhea.     REVIEW OF SYSTEMS  Unremarkable except generalized weakness     PHYSICAL EXAM  Blood pressure 142/58, pulse 80, temperature 99.2 °F (37.3 °C), temperature source Oral, resp. rate 14, height 157.5 cm (62.01\"), weight 69.3 kg (152 lb 12.5 oz), SpO2 96 %.    GENERAL: Awake and alert, no acute distress  HENT: nares patent, oropharynx clear, dentures present  EYES: no scleral icterus, EOMI  CV: regular rhythm, normal rate, no murmur appreciated  RESPIRATORY: normal effort, lungs clear auscultation bilaterally  ABDOMEN: soft, nondistended, nontender throughout  MUSCULOSKELETAL: Status post right AKA, left lower extremity is normal to inspection  NEURO: alert, moves all extremities, follows commands  PSYCH:  calm, cooperative  SKIN: warm, dry     LAB RESULTS   Lab Results (last 24 hours)     Procedure Component Value Units Date/Time    POC Glucose Once [[id number]]  (Normal) Collected: 04/05/22 1059    Specimen: Blood Updated: 04/05/22 1100     Glucose 95 mg/dL      Comment: Meter: [id number] : [id number] Caleb Tena RN       Basic Metabolic Panel [[id number]]  (Abnormal) Collected: 04/05/22 0641    Specimen: Blood Updated: 04/05/22 0745     Glucose 193 mg/dL      BUN 11 mg/dL      Creatinine " 0.83 mg/dL      Sodium 136 mmol/L      Potassium 5.2 mmol/L      Chloride 106 mmol/L      CO2 16.2 mmol/L      Calcium 8.4 mg/dL      BUN/Creatinine Ratio 13.3     Anion Gap 13.8 mmol/L      eGFR 67.1 mL/min/1.73      Comment: National Kidney Foundation and American Society of Nephrology (ASN) Task Force recommended calculation based on the Chronic Kidney Disease Epidemiology Collaboration (CKD-EPI) equation refit without adjustment for race.       Narrative:      GFR Normal >60  Chronic Kidney Disease <60  Kidney Failure <15      CBC & Differential [[id number]]  (Abnormal) Collected: 04/05/22 0642    Specimen: Blood Updated: 04/05/22 0710    Narrative:      The following orders were created for panel order CBC & Differential.  Procedure                               Abnormality         Status                     ---------                               -----------         ------                     CBC Auto Differential[[id number]]        Abnormal            Final result                 Please view results for these tests on the individual orders.    CBC Auto Differential [[id number]]  (Abnormal) Collected: 04/05/22 0642    Specimen: Blood Updated: 04/05/22 0710     WBC 13.22 10*3/mm3      RBC 3.72 10*6/mm3      Hemoglobin 10.9 g/dL      Hematocrit 33.4 %      MCV 89.8 fL      MCH 29.3 pg      MCHC 32.6 g/dL      RDW 15.4 %      RDW-SD 49.9 fl      MPV 10.0 fL      Platelets 461 10*3/mm3      Neutrophil % 71.1 %      Lymphocyte % 21.0 %      Monocyte % 6.8 %      Eosinophil % 0.1 %      Basophil % 0.3 %      Immature Grans % 0.7 %      Neutrophils, Absolute 9.41 10*3/mm3      Lymphocytes, Absolute 2.77 10*3/mm3      Monocytes, Absolute 0.90 10*3/mm3      Eosinophils, Absolute 0.01 10*3/mm3      Basophils, Absolute 0.04 10*3/mm3      Immature Grans, Absolute 0.09 10*3/mm3      nRBC 0.0 /100 WBC     POC Glucose Once [[id number]]  (Abnormal) Collected: 04/05/22 0625    Specimen: Blood Updated: 04/05/22 0626     Glucose 202  mg/dL      Comment: Meter: [id number] : [id number] Caro Blankenship NA       POC Glucose Once [[id number]]  (Abnormal) Collected: 04/04/22 2139    Specimen: Blood Updated: 04/04/22 2140     Glucose 147 mg/dL      Comment: Meter: [id number] : [id number] Caro Blankenship NA       POC Glucose Once [[id number]]  (Normal) Collected: 04/04/22 1601    Specimen: Blood Updated: 04/04/22 1602     Glucose 103 mg/dL      Comment: Meter: [id number] : [id number] Houstontamika Lara NA       MARLENY AURIS SCREEN - Swab, Axilla Right, Axilla Left and Groin [[id number]]  (Normal) Collected: 04/03/22 1407    Specimen: Swab from Axilla Right, Axilla Left and Groin Updated: 04/04/22 1433     Candida Auris Screen Culture No Candida auris isolated at 24 hours        Imaging Results (Last 24 Hours)     Procedure Component Value Units Date/Time    FL Video Swallow With Speech Single Contrast [[id number]] Collected: 04/04/22 1523     Updated: 04/04/22 1528    Narrative:      VIDEO SWALLOW STUDY     HISTORY: Dysphagia.     3 minutes 25 seconds fluoroscopy was provided for the speech pathologist  during a video swallow study. [id number] images were saved.     There is multilevel degenerative disc disease. There is bilateral  calcific carotid atherosclerosis. There is premature spillage of  ingested contents. No episodes of penetration or aspiration are  identified.       Impression:      Fluoroscopy was provided for the speech pathologist during a  video swallow study. For full details please see the speech pathology  report.     This report was finalized on 4/4/2022 3:25 PM by Dr. Marissa Rondon M.D.             Current Facility-Administered Medications:   •  acetaminophen (TYLENOL) tablet 650 mg, 650 mg, Oral, Q6H PRN, Rigoberto Law MD, 650 mg at 04/05/22 0829  •  aluminum sulfate-calcium acetate (DOMEBORO) topical packet 1 packet, 1 packet, Topical, Q8H, Rigoberto Law MD, 1 packet at 04/05/22 1342  •  amLODIPine (NORVASC) tablet 10 mg, 10 mg, Oral,  Q24H, Rigoberto Law MD, 10 mg at 04/05/22 0829  •  aspirin EC tablet 81 mg, 81 mg, Oral, Daily, Rigoberto Law MD, 81 mg at 04/05/22 0829  •  cloNIDine (CATAPRES) tablet 0.2 mg, 0.2 mg, Oral, Q8H, Rigoberto Law MD, 0.2 mg at 04/05/22 1342  •  fluconazole (DIFLUCAN) tablet 100 mg, 100 mg, Oral, Q24H, Rigoberto Law MD, 100 mg at 04/05/22 1343  •  FLUoxetine (PROzac) capsule 20 mg, 20 mg, Oral, Daily, Rigoberto Law MD, 20 mg at 04/05/22 0829  •  gabapentin (NEURONTIN) capsule 100 mg, 100 mg, Oral, Nightly, Rigoberto Law MD, 100 mg at 04/04/22 2141  •  hydrALAZINE (APRESOLINE) tablet 100 mg, 100 mg, Oral, Q8H, Rigoberto Law MD, 100 mg at 04/05/22 1342  •  HYDROcodone-acetaminophen (NORCO) 5-325 MG per tablet 1 tablet, 1 tablet, Oral, Q6H PRN, Riogberto Law MD, 1 tablet at 04/05/22 1342  •  hydrocortisone-bacitracin-zinc oxide-nystatin (MAGIC BARRIER) ointment 1 application, 1 application, Topical, PRN, Rigoberto Law MD, 1 application at 04/05/22 1340  •  insulin lispro (ADMELOG) injection 0-7 Units, 0-7 Units, Subcutaneous, 4x Daily With Meals & Nightly, Rigoberto Law MD, 2 Units at 04/05/22 0829  •  melatonin tablet 3 mg, 3 mg, Oral, Nightly PRN, Rigoberto Law MD  •  ondansetron (ZOFRAN) injection 4 mg, 4 mg, Intravenous, Q6H PRN, Rigoberto Law MD, 4 mg at 04/05/22 1342  •  pantoprazole (PROTONIX) EC tablet 40 mg, 40 mg, Oral, BID AC, Rigoberto Law MD, 40 mg at 04/05/22 0828  •  pravastatin (PRAVACHOL) tablet 10 mg, 10 mg, Oral, Nightly, Rigoberto Law MD, 10 mg at 04/04/22 2139  •  prochlorperazine (COMPAZINE) injection 10 mg, 10 mg, Intravenous, Q6H PRN, Rigoberto Law MD, 10 mg at 04/05/22 0828  •  [COMPLETED] Insert peripheral IV, , , Once **AND** sodium chloride 0.9 % flush 10 mL, 10 mL, Intravenous, PRN, [place], Waldo MAE MD  •  valsartan (DIOVAN) tablet 320 mg, 320 mg, Oral, Q24H, Rigoberto Law MD, 320 mg at 04/05/22 0829     ASSESSMENT  Oropharyngeal dysphagia  Persistent nausea vomiting resolved  Acute yeast  UTI  Recent aspiration pneumonia  Diabetes mellitus  Hypertension  Hyperlipidemia  Peripheral artery disease status post right AKA  Immobilization syndrome  Gastroesophageal reflux disease    PLAN  CPM  Change diet to puréed with thin liquids  No endoscopy at this time per GI  Diflucan for 5 days  GI consult appreciated  Continue nursing home medications  Stress ulcer DVT prophylaxis  Supportive care  DNR  PT/OT  Discussed with family staff and gastroenterology  Discharge planning    VANCE VILLEDA MD

## 2022-04-06 PROBLEM — I21.4 NSTEMI (NON-ST ELEVATED MYOCARDIAL INFARCTION) (HCC): Status: ACTIVE | Noted: 2022-01-01

## 2022-04-06 NOTE — PROGRESS NOTES
"Daily progress note    Chief complaint  Doing same  No new complaints  Awake and alert  Still difficulty with swallowing  Denies nausea vomiting  Denies chest pain shortness of breath  Following all commands    History of present illness  89-year-old -American female who is well-known to our service with history of peripheral artery disease status post right AKA who also have diabetes mellitus hypertension hyperlipidemia gastroesophageal reflux disease who has been immobilized and was recently treated for UTI pneumonia and discharged back to nursing home in stable condition sent to the ER that she has had a choking episode and unable to eat with generalized weakness.  Patient evaluated in ER admit for further work-up.  Patient fully alert oriented denies any fever cough chest pain increase shortness of breath abdominal pain nausea vomiting diarrhea.     REVIEW OF SYSTEMS  Unremarkable except generalized weakness     PHYSICAL EXAM  Blood pressure 145/98, pulse 89, temperature 97 °F (36.1 °C), temperature source Oral, resp. rate 19, height 157.5 cm (62.01\"), weight 69.3 kg (152 lb 12.5 oz), SpO2 94 %.    GENERAL: Awake and alert, no acute distress  HENT: nares patent, oropharynx clear, dentures present  EYES: no scleral icterus, EOMI  CV: regular rhythm, normal rate, no murmur appreciated  RESPIRATORY: normal effort, lungs clear auscultation bilaterally  ABDOMEN: soft, nondistended, nontender throughout  MUSCULOSKELETAL: Status post right AKA, left lower extremity is normal to inspection  NEURO: alert, moves all extremities, follows commands  PSYCH:  calm, cooperative  SKIN: warm, dry     LAB RESULTS   Lab Results (last 24 hours)     Procedure Component Value Units Date/Time    CBC & Differential [[id number]]  (Abnormal) Collected: 04/06/22 1233    Specimen: Blood Updated: 04/06/22 1324    Narrative:      The following orders were created for panel order CBC & Differential.  Procedure                             "   Abnormality         Status                     ---------                               -----------         ------                     CBC Auto Differential[[id number]]        Abnormal            Final result                 Please view results for these tests on the individual orders.    CBC Auto Differential [[id number]]  (Abnormal) Collected: 04/06/22 1233    Specimen: Blood Updated: 04/06/22 1324     WBC 20.00 10*3/mm3      RBC 4.00 10*6/mm3      Hemoglobin 11.6 g/dL      Hematocrit 35.9 %      MCV 89.8 fL      MCH 29.0 pg      MCHC 32.3 g/dL      RDW 15.4 %      RDW-SD 50.9 fl      MPV 10.0 fL      Platelets 469 10*3/mm3      Neutrophil % 81.8 %      Lymphocyte % 9.3 %      Monocyte % 7.5 %      Eosinophil % 0.5 %      Basophil % 0.2 %      Immature Grans % 0.7 %      Neutrophils, Absolute 16.40 10*3/mm3      Lymphocytes, Absolute 1.85 10*3/mm3      Monocytes, Absolute 1.49 10*3/mm3      Eosinophils, Absolute 0.09 10*3/mm3      Basophils, Absolute 0.03 10*3/mm3      Immature Grans, Absolute 0.14 10*3/mm3      nRBC 0.2 /100 WBC     Troponin [[id number]] Collected: 04/06/22 1233    Specimen: Blood Updated: 04/06/22 1319    Protime-INR [[id number]]  (Abnormal) Collected: 04/06/22 1102    Specimen: Blood from Hand, Right Updated: 04/06/22 1206     Protime 16.1 Seconds      INR 1.29    aPTT [[id number]]  (Normal) Collected: 04/06/22 1102    Specimen: Blood from Hand, Right Updated: 04/06/22 1206     PTT 30.3 seconds     POC Glucose Once [[id number]]  (Abnormal) Collected: 04/06/22 1106    Specimen: Blood Updated: 04/06/22 1109     Glucose 226 mg/dL      Comment: Meter: [id number] : [id number] Roberto Carlos [place]       CBC & Differential [[id number]]  (Abnormal) Collected: 04/06/22 0557    Specimen: Blood Updated: 04/06/22 0821    Narrative:      The following orders were created for panel order CBC & Differential.  Procedure                               Abnormality         Status                     ---------                                -----------         ------                     CBC Auto Differential[[id number]]        Abnormal            Final result                 Please view results for these tests on the individual orders.    CBC Auto Differential [[id number]]  (Abnormal) Collected: 04/06/22 0557    Specimen: Blood Updated: 04/06/22 0821     WBC 20.41 10*3/mm3      RBC 3.85 10*6/mm3      Hemoglobin 11.2 g/dL      Hematocrit 34.7 %      MCV 90.1 fL      MCH 29.1 pg      MCHC 32.3 g/dL      RDW 15.1 %      RDW-SD 49.4 fl      MPV 10.0 fL      Platelets 443 10*3/mm3      Neutrophil % 84.6 %      Lymphocyte % 7.0 %      Monocyte % 6.9 %      Eosinophil % 0.2 %      Basophil % 0.2 %      Immature Grans % 1.1 %      Neutrophils, Absolute 17.27 10*3/mm3      Lymphocytes, Absolute 1.43 10*3/mm3      Monocytes, Absolute 1.40 10*3/mm3      Eosinophils, Absolute 0.04 10*3/mm3      Basophils, Absolute 0.04 10*3/mm3      Immature Grans, Absolute 0.23 10*3/mm3      nRBC 0.2 /100 WBC     Troponin [[id number]]  (Abnormal) Collected: 04/06/22 0557    Specimen: Blood Updated: 04/06/22 0710     Troponin T 0.335 ng/mL     Narrative:      Troponin T Reference Range:  <= 0.03 ng/mL-   Negative for AMI  >0.03 ng/mL-     Abnormal for myocardial necrosis.  Clinicians would have to utilize clinical acumen, EKG, Troponin and serial changes to determine if it is an Acute Myocardial Infarction or myocardial injury due to an underlying chronic condition.       Results may be falsely decreased if patient taking Biotin.      Basic Metabolic Panel [[id number]]  (Abnormal) Collected: 04/06/22 0557    Specimen: Blood Updated: 04/06/22 0703     Glucose 256 mg/dL      BUN 12 mg/dL      Creatinine 0.92 mg/dL      Sodium 137 mmol/L      Potassium 5.0 mmol/L      Chloride 104 mmol/L      CO2 17.2 mmol/L      Calcium 8.6 mg/dL      BUN/Creatinine Ratio 13.0     Anion Gap 15.8 mmol/L      eGFR 59.3 mL/min/1.73      Comment: National Kidney Foundation and American  Society of Nephrology (ASN) Task Force recommended calculation based on the Chronic Kidney Disease Epidemiology Collaboration (CKD-EPI) equation refit without adjustment for race.       Narrative:      GFR Normal >60  Chronic Kidney Disease <60  Kidney Failure <15      POC Glucose Once [[id number]]  (Abnormal) Collected: 04/06/22 0612    Specimen: Blood Updated: 04/06/22 0619     Glucose 243 mg/dL      Comment: Meter: [id number] : [id number] Roshan Bright CNA       Troponin [[id number]]  (Abnormal) Collected: 04/05/22 2009    Specimen: Blood Updated: 04/05/22 2103     Troponin T 0.057 ng/mL     Narrative:      Troponin T Reference Range:  <= 0.03 ng/mL-   Negative for AMI  >0.03 ng/mL-     Abnormal for myocardial necrosis.  Clinicians would have to utilize clinical acumen, EKG, Troponin and serial changes to determine if it is an Acute Myocardial Infarction or myocardial injury due to an underlying chronic condition.       Results may be falsely decreased if patient taking Biotin.      POC Glucose Once [[id number]]  (Abnormal) Collected: 04/05/22 2015    Specimen: Blood Updated: 04/05/22 2017     Glucose 250 mg/dL      Comment: Meter: [id number] : [id number] Arnie [place]       POC Glucose Once [[id number]]  (Abnormal) Collected: 04/05/22 1555    Specimen: Blood Updated: 04/05/22 1558     Glucose 156 mg/dL      Comment: Meter: [id number] : [id number] Caleb Tena RN       MARLENY AURIS SCREEN - Swab, Axilla Right, Axilla Left and Groin [[id number]]  (Normal) Collected: 04/03/22 1407    Specimen: Swab from Axilla Right, Axilla Left and Groin Updated: 04/05/22 1431     Candida Auris Screen Culture No Candida auris isolated at 2 days        Imaging Results (Last 24 Hours)     Procedure Component Value Units Date/Time    XR Chest 1 View [[id number]] Collected: 04/06/22 1251     Updated: 04/06/22 1313    Narrative:      PORTABLE VIEW OF THE CHEST 04/06/2022     CLINICAL HISTORY: Elevated white count with  recent pneumonia.     This is correlated to a portable view of the chest 11/18/2020 and a  chest CT on 03/16/2022.     FINDINGS: Patient is rotated on this exam which slightly limits  evaluation. The cardiomediastinal silhouettes upper limits of normal.  The pulmonary vasculature is within normal limits. There are low lung  lungs with horizontal linear atelectasis at lung bases. Otherwise, the  lungs are clear. The costophrenic angles are sharp. There are advanced  osteoarthritic changes in the glenohumeral joints bilaterally.       Impression:      1. No definite active disease is seen in the chest. There are low lung  volumes with horizontal linear atelectasis at the lung bases. Recent  chest CT 03/16/2022 demonstrate multifocal small patchy airspace  opacities in the superior segment of the left lower lobe in the  periphery of the left lower lung and left lung base that are not  discernible on this current chest x-ray but may be difficult to  appreciate on standard chest x-ray.  2. There are advanced osteoarthritic changes in the glenohumeral joints  bilaterally.     This report was finalized on 4/6/2022 1:10 PM by Dr. Robert Talbot M.D.             Current Facility-Administered Medications:   •  acetaminophen (TYLENOL) tablet 650 mg, 650 mg, Oral, Q6H PRN, Rigoberto Law MD, 650 mg at 04/05/22 2124  •  aluminum sulfate-calcium acetate (DOMEBORO) topical packet 1 packet, 1 packet, Topical, Q8H, Rigoberto Law MD, 1 packet at 04/06/22 0631  •  amLODIPine (NORVASC) tablet 10 mg, 10 mg, Oral, Q24H, Rigoberto Law MD, 10 mg at 04/06/22 0849  •  cloNIDine (CATAPRES) tablet 0.2 mg, 0.2 mg, Oral, Q8H, Rigoberto Law MD, 0.2 mg at 04/06/22 0850  •  clopidogrel (PLAVIX) tablet 75 mg, 75 mg, Oral, Daily, Marleny Bermudez APRN, 75 mg at 04/06/22 0850  •  fluconazole (DIFLUCAN) tablet 100 mg, 100 mg, Oral, Q24H, Rigoberto Law MD, 100 mg at 04/05/22 1343  •  FLUoxetine (PROzac) capsule 20 mg, 20 mg, Oral, Daily, Rigoberto Law MD,  20 mg at 04/06/22 0849  •  gabapentin (NEURONTIN) capsule 100 mg, 100 mg, Oral, Nightly, Rigoberto Law MD, 100 mg at 04/05/22 2113  •  heparin (porcine) 5000 UNIT/ML injection 2,100-4,000 Units, 30-57.7 Units/kg, Intravenous, Q6H PRN, Marleny Bermudez APRN  •  heparin (porcine) 5000 UNIT/ML injection 4,000 Units, 57.7 Units/kg, Intravenous, Once, Marleny Bermudez APRN  •  heparin 78975 units/250 mL (100 units/mL) in 0.45 % NaCl infusion, 12 Units/kg/hr, Intravenous, Titrated, Marleny Bermudez APRN  •  hydrALAZINE (APRESOLINE) tablet 100 mg, 100 mg, Oral, Q8H, Rigoberto Law MD, 100 mg at 04/06/22 0849  •  HYDROcodone-acetaminophen (NORCO) 5-325 MG per tablet 1 tablet, 1 tablet, Oral, Q6H PRN, Rigoberto Law MD, 1 tablet at 04/05/22 2208  •  hydrocortisone-bacitracin-zinc oxide-nystatin (MAGIC BARRIER) ointment 1 application, 1 application, Topical, PRN, Rigoberto Law MD, 1 application at 04/06/22 0634  •  insulin lispro (ADMELOG) injection 0-7 Units, 0-7 Units, Subcutaneous, 4x Daily With Meals & Nightly, Rigoberto Law MD, 3 Units at 04/06/22 0850  •  melatonin tablet 3 mg, 3 mg, Oral, Nightly PRN, Rigoberto Law MD  •  metoprolol tartrate (LOPRESSOR) injection 5 mg, 5 mg, Intravenous, Q6H PRN, Rigoberto Law MD, 5 mg at 04/05/22 1857  •  metoprolol tartrate (LOPRESSOR) tablet 12.5 mg, 12.5 mg, Oral, Q12H, Rigoberto Law MD, 12.5 mg at 04/06/22 0850  •  nitroglycerin (NITROSTAT) ointment 1 inch, 1 inch, Topical, Q6H, Marleny Bermudez APRN, 1 inch at 04/06/22 0631  •  ondansetron (ZOFRAN) injection 4 mg, 4 mg, Intravenous, Q6H PRN, Rigoberto Lwa MD, 4 mg at 04/05/22 2208  •  pantoprazole (PROTONIX) EC tablet 40 mg, 40 mg, Oral, BID AC, Rigoberto Law MD, 40 mg at 04/06/22 0850  •  pravastatin (PRAVACHOL) tablet 10 mg, 10 mg, Oral, Nightly, Rigoberto Law MD, 10 mg at 04/05/22 2114  •  prochlorperazine (COMPAZINE) injection 10 mg, 10 mg, Intravenous, Q6H PRN, Rigoberto Law MD, 10 mg at 04/05/22 1642  •  sodium  bicarbonate tablet 650 mg, 650 mg, Oral, TID, Vance Law MD, 650 mg at 04/06/22 0849  •  [COMPLETED] Insert peripheral IV, , , Once **AND** sodium chloride 0.9 % flush 10 mL, 10 mL, Intravenous, PRN, Elia, Waldo MAE MD, 10 mL at 04/05/22 2114  •  valsartan (DIOVAN) tablet 320 mg, 320 mg, Oral, Q24H, Vance Law MD, 320 mg at 04/06/22 0849     ASSESSMENT  Elevated troponin consistent with non-ST elevation MI  SVT  Oropharyngeal dysphagia  Persistent nausea vomiting resolved  Acute yeast UTI  Recent aspiration pneumonia  Diabetes mellitus  Hypertension  Hyperlipidemia  Leukocytosis questionable source probably reactive  Peripheral artery disease status post right AKA  Immobilization syndrome  Gastroesophageal reflux disease    PLAN  CPM  Controlled heart rate  Heparin started by cardiology  GI and cardiology to follow patient  Diflucan for 5 days and ID consult  Continue nursing home medications  Stress ulcer DVT prophylaxis  Supportive care  DNR and poor prognosis  PT/OT  Discussed with family staff and gastroenterology  Follow closely and further recommendation according to hospital course    VANCE LAW MD

## 2022-04-06 NOTE — CONSULTS
[facility]  Cardiology Consult Note    Patient Identification:  Name: Gisela Gandara  Age: 89 y.o.  Sex: female  :  1932  MRN: [id number]             Requesting Physician: Dr Law    Reason for Consultation / Chief Complaint: Tachycardia, abnormal ECG    History of Present Illness:     This is a 89-year-old female who is known to our service.  She has a history to include diabetes mellitus, hyperlipidemia, hypertension, right AKA, arthritis.  She presented to [facility] ER from [facility] after complaints of not feeling well and having difficulty eating, gagging when she tries to eat.  In ER she was found to have UTI, and admitted for further management of UTI dysphagia.  She was seen by speech therapy and had video swallow study, with lack of findings and recommended puréed diet or liquid per patient tolerance and GI following.  Negative esophagram and GI felt EGD to high risk for patient.  Yesterday evening she developed sinus tachycardia.  She also had complaints of back pain.  ECG showed sinus tach with similar QRS morphology to previous ECG on 3/22/2022.  Initial troponin 0.05, repeat this morning 0.335.  She was started on Nitropaste last night that resolved back discomfort.    Echo 3/21/2022 EF 78%, mild AV stenosis, grade 1 LV diastolic dysfunction.    Comorbid cardiac risk factors: age. DM, hypertension, hyperlipidemia    Past Medical History:  Past Medical History:   Diagnosis Date    Arthritis     Diabetes mellitus (HCC)     Hyperlipidemia     Hypertension      Past Surgical History:  No past surgical history on file.   Allergies:  No Known Allergies  Home Meds:  Medications Prior to Admission   Medication Sig Dispense Refill Last Dose    acetaminophen (TYLENOL) 500 MG tablet Take 500 mg by mouth Every 6 (Six) Hours As Needed for Mild Pain .       amLODIPine (NORVASC) 10 MG tablet Take 1 tablet by mouth Daily for 30 days. 30 tablet 0     aspirin 81  MG EC tablet Take 81 mg by mouth Daily.       cloNIDine (CATAPRES) 0.2 MG tablet Take 1 tablet by mouth Every 8 (Eight) Hours for 30 days. 90 tablet 0     FLUoxetine (PROzac) 20 MG capsule Take 20 mg by mouth Daily.       gabapentin (NEURONTIN) 100 MG capsule Take 100 mg by mouth Every Night.       guaiFENesin (ROBITUSSIN) 100 MG/5ML solution oral solution Take 200 mg by mouth Every 4 (Four) Hours As Needed.       hydrALAZINE (APRESOLINE) 50 MG tablet Take 1 tablet by mouth Every 8 (Eight) Hours for 30 days. 90 tablet 0     insulin lispro (humaLOG) 100 UNIT/ML injection Inject 0-24 Units under the skin into the appropriate area as directed 4 (Four) Times a Day With Meals & at Bedtime.  12     melatonin 3 MG tablet Take 3 mg by mouth Every Night.       omeprazole (priLOSEC) 20 MG capsule Take 20 mg by mouth 2 (Two) Times a Day.       potassium chloride (K-DUR,KLOR-CON) 20 MEQ CR tablet Take 1 tablet by mouth 2 (Two) Times a Day With Meals for 30 days. 60 tablet 0     pravastatin (PRAVACHOL) 10 MG tablet Take 1 tablet by mouth Every Night for 30 days. 30 tablet 0     valsartan (DIOVAN) 320 MG tablet Take 1 tablet by mouth Daily for 30 days. 30 tablet 0      Current Meds:   Current Facility-Administered Medications   Medication Dose Route Frequency Provider Last Rate Last Admin    acetaminophen (TYLENOL) tablet 650 mg  650 mg Oral Q6H PRN Rigoberto Law MD   650 mg at 04/05/22 2124    aluminum sulfate-calcium acetate (DOMEBORO) topical packet 1 packet  1 packet Topical Q8H Rigoberto Law MD   1 packet at 04/06/22 0631    amLODIPine (NORVASC) tablet 10 mg  10 mg Oral Q24H Rigoberto Law MD   10 mg at 04/05/22 0829    aspirin EC tablet 81 mg  81 mg Oral Daily Rigoberto Law MD   81 mg at 04/05/22 0829    cloNIDine (CATAPRES) tablet 0.2 mg  0.2 mg Oral Q8H Rigoberto Law MD   0.2 mg at 04/05/22 2114    clopidogrel (PLAVIX) tablet 75 mg  75 mg Oral Daily Marleny Bermudez APRN        fluconazole (DIFLUCAN) tablet 100 mg  100 mg  Oral Q24H Rigoberto Law MD   100 mg at 04/05/22 1343    FLUoxetine (PROzac) capsule 20 mg  20 mg Oral Daily Rigoberto Law MD   20 mg at 04/05/22 0829    gabapentin (NEURONTIN) capsule 100 mg  100 mg Oral Nightly Rigoberto Law MD   100 mg at 04/05/22 2113    hydrALAZINE (APRESOLINE) tablet 100 mg  100 mg Oral Q8H Rigoberto Law MD   100 mg at 04/05/22 2114    HYDROcodone-acetaminophen (NORCO) 5-325 MG per tablet 1 tablet  1 tablet Oral Q6H PRN Rigoberto aLw MD   1 tablet at 04/05/22 2208    hydrocortisone-bacitracin-zinc oxide-nystatin (MAGIC BARRIER) ointment 1 application  1 application Topical PRN Rigoberto Law MD   1 application at 04/06/22 0634    insulin lispro (ADMELOG) injection 0-7 Units  0-7 Units Subcutaneous 4x Daily With Meals & Nightly Rigoberto Law MD   4 Units at 04/05/22 2121    melatonin tablet 3 mg  3 mg Oral Nightly PRN Rigoberto Law MD        metoprolol tartrate (LOPRESSOR) injection 5 mg  5 mg Intravenous Q6H PRN Rigoberto Law MD   5 mg at 04/05/22 1857    metoprolol tartrate (LOPRESSOR) tablet 12.5 mg  12.5 mg Oral Q12H Rigoberto Law MD   12.5 mg at 04/05/22 2113    nitroglycerin (NITROSTAT) ointment 1 inch  1 inch Topical Q6H Marleny Bermudez APRN   1 inch at 04/06/22 0631    ondansetron (ZOFRAN) injection 4 mg  4 mg Intravenous Q6H PRN Rigoberto Law MD   4 mg at 04/05/22 2208    pantoprazole (PROTONIX) EC tablet 40 mg  40 mg Oral BID AC Rigoberto Law MD   40 mg at 04/05/22 0828    pravastatin (PRAVACHOL) tablet 10 mg  10 mg Oral Nightly Rigoberto Law MD   10 mg at 04/05/22 2114    prochlorperazine (COMPAZINE) injection 10 mg  10 mg Intravenous Q6H PRN Rigoberto Law MD   10 mg at 04/05/22 1642    sodium bicarbonate tablet 650 mg  650 mg Oral TID Rigoberto Law MD   650 mg at 04/06/22 0014    sodium chloride 0.9 % flush 10 mL  10 mL Intravenous PRN EliaWaldo gonzalez MD   10 mL at 04/05/22 2114    valsartan (DIOVAN) tablet 320 mg  320 mg Oral Q24H Rigoberto Law MD   320 mg at 04/05/22 9538  "      Social History:   Social History     Tobacco Use    Smoking status: Former Smoker    Smokeless tobacco: Former User   Substance Use Topics    Alcohol use: Not on file      Family History:  No family history on file.     ROS  Unable to obtain                 Physical Exam  /76   Pulse 96   Temp 97 °F (36.1 °C) (Oral)   Resp 19   Ht 157.5 cm (62.01\")   Wt 69.3 kg (152 lb 12.5 oz)   SpO2 94%   BMI 27.94 kg/m²     General appearance: No acute changes   Eyes: Sclerae conjunctivae normal, pupils reactive   HENT: Atraumatic; oropharynx clear with moist mucous membranes and no mucosal ulcerations;  Neck: Trachea midline; NECK, supple, no thyromegaly or lymphadenopathy   Lungs: Normal size and shape, normal breath sounds, equal distribution of air, no rales and rhonchi   CV: S1-S2 regular, no murmurs, no rub, no gallop   Abdomen: Soft, nontender; no masses , no abnormal abdominal sounds   Extremities: No deformity , normal color , no peripheral edema   Skin: Normal temperature, turgor and texture; no rash, ulcers  Psych: Unable to evaluate                  Cardiographics  ECG:         Comparison 3/22/22      Telemetry:    Echocardiogram:   Interpretation Summary    Estimated right ventricular systolic pressure from tricuspid regurgitation is mildly elevated (35-45 mmHg).  Mild aortic valve stenosis is present.  Calculated left ventricular EF = 78.5% Estimated left ventricular EF was in agreement with the calculated left ventricular EF.  Left ventricular diastolic function is consistent with (grade I) impaired relaxation.  The right atrial cavity is borderline dilated.  There is no evidence of pericardial effusion. .      Imaging       CONCLUSION: Negative esophagram. 103 images were obtained and the  fluoroscopy time measures 32 seconds.     This report was finalized on 4/2/2022 4:14 PM by Dr. Ran Villalta M.D.    Lab Review   Results from last 7 days   Lab Units 04/06/22  0557 04/05/22  2009   TROPONIN " T ng/mL 0.335* 0.057*         Results from last 7 days   Lab Units 04/06/22  0557   SODIUM mmol/L 137   POTASSIUM mmol/L 5.0   BUN mg/dL 12   CREATININE mg/dL 0.92   CALCIUM mg/dL 8.6     @[id number]@  Results from last 7 days   Lab Units 04/05/22  0642 04/04/22  0821 04/03/22  0722   WBC 10*3/mm3 13.22* 9.67 10.96*   HEMOGLOBIN g/dL 10.9* 9.8* 10.0*   HEMATOCRIT % 33.4* 29.2* 30.1*   PLATELETS 10*3/mm3 461* 404 415             Assessment:  Oropharyngeal dysphagia  Acute UTI  Diabetes mellitus  Hypertension  Hyperlipidemia  Elevated troponin  ACS      Recommendations / Plan:     This is a 89-year-old female who is known to our service.  She has a history to include diabetes mellitus, hyperlipidemia, hypertension, right AKA, arthritis.  She presented to [facility] ER from [facility] after complaints of having difficulty eating, gagging when she tries to eat.  In ER she was found to have UTI, and admitted for further management of UTI dysphagia.  GI following, negative esophagram, video swallow study without findings.  Yesterday evening she developed sinus tachycardia.  She also had complaints of back pain.  ECG showed sinus tach with similar QRS morphology to previous ECG on 3/22/2022.  Initial troponin 0.05, repeat this morning 0.335.  She was started on Nitropaste last night that resolved back discomfort. Echo 3/21/2022 EF 78%, mild AV stenosis, grade 1 LV diastolic dysfunction.    We will start heparin drip, add Plavix 75 mg daily.  Discontinue aspirin while on heparin drip and may resume aspirin once off heparin drip.  She has no chest pain, will defer cardiac catheterization at this time.  Continue Nitropaste and beta-blockade, ARB, statin.       Labs/tests ordered for am: Troponin, ECG      ROHIT Mccall  4/6/2022, 07:47 EDT    Patient personally interviewed and above subjective findings personally confirmed during a face to face contact with patient today  All findings  of physical examination confirmed  All pertinent and performed labs, cardiac procedures ,  radiographs of the last 24 hours personally reviewed  Impression and plans discussed/elaborated and implemented jointly as described above     Mike Martinez MD          EMR Dragon/Transcription:   Dictated utilizing Dragon dictation

## 2022-04-06 NOTE — PROGRESS NOTES
[facility]  Inpatient Progress Note    Reason for Follow Up:  Choking episode, unable to eat    Subjective     Interval History: She is intermittently confused but she denies dysphagia.  She did have some nausea and vomiting.  Her white count is 20,000 and her troponin level is 0.335 today.  Cardiology is seeing her.      Current Facility-Administered Medications:   •  acetaminophen (TYLENOL) tablet 650 mg, 650 mg, Oral, Q6H PRN, Rigoberto Law MD, 650 mg at 04/05/22 2124  •  aluminum sulfate-calcium acetate (DOMEBORO) topical packet 1 packet, 1 packet, Topical, Q8H, Rigoberto Law MD, 1 packet at 04/06/22 0631  •  amLODIPine (NORVASC) tablet 10 mg, 10 mg, Oral, Q24H, Rigoberto Law MD, 10 mg at 04/06/22 0849  •  cloNIDine (CATAPRES) tablet 0.2 mg, 0.2 mg, Oral, Q8H, Rigoberto Law MD, 0.2 mg at 04/06/22 0850  •  clopidogrel (PLAVIX) tablet 75 mg, 75 mg, Oral, Daily, Marleny Bermudez APRN, 75 mg at 04/06/22 0850  •  fluconazole (DIFLUCAN) tablet 100 mg, 100 mg, Oral, Q24H, Rigoberto Law MD, 100 mg at 04/05/22 1343  •  FLUoxetine (PROzac) capsule 20 mg, 20 mg, Oral, Daily, Rigoberto Law MD, 20 mg at 04/06/22 0849  •  gabapentin (NEURONTIN) capsule 100 mg, 100 mg, Oral, Nightly, Rigoberto Law MD, 100 mg at 04/05/22 2113  •  heparin (porcine) 5000 UNIT/ML injection 2,100-4,000 Units, 30-57.7 Units/kg, Intravenous, Q6H PRN, Marleny Bermudez APRN  •  heparin (porcine) 5000 UNIT/ML injection 4,000 Units, 57.7 Units/kg, Intravenous, Once, Marleny Bermudez APRN  •  heparin 81445 units/250 mL (100 units/mL) in 0.45 % NaCl infusion, 12 Units/kg/hr, Intravenous, Titrated, Marleny Bermudez APRN  •  hydrALAZINE (APRESOLINE) tablet 100 mg, 100 mg, Oral, Q8H, Rigoberto Law MD, 100 mg at 04/06/22 0849  •  HYDROcodone-acetaminophen (NORCO) 5-325 MG per tablet 1 tablet, 1 tablet, Oral, Q6H PRN, Rigoberto Law MD, 1 tablet at 04/05/22 2208  •  hydrocortisone-bacitracin-zinc oxide-nystatin (MAGIC BARRIER)  ointment 1 application, 1 application, Topical, PRN, Rigoberto Law MD, 1 application at 04/06/22 0634  •  insulin lispro (ADMELOG) injection 0-7 Units, 0-7 Units, Subcutaneous, 4x Daily With Meals & Nightly, Rigoberto Law MD, 3 Units at 04/06/22 0850  •  melatonin tablet 3 mg, 3 mg, Oral, Nightly PRN, Rigoberto Law MD  •  metoprolol tartrate (LOPRESSOR) injection 5 mg, 5 mg, Intravenous, Q6H PRN, Rigoberto Law MD, 5 mg at 04/05/22 1857  •  metoprolol tartrate (LOPRESSOR) tablet 12.5 mg, 12.5 mg, Oral, Q12H, Rigoberto Law MD, 12.5 mg at 04/06/22 0850  •  nitroglycerin (NITROSTAT) ointment 1 inch, 1 inch, Topical, Q6H, Marleny Bermudez APRN, 1 inch at 04/06/22 0631  •  ondansetron (ZOFRAN) injection 4 mg, 4 mg, Intravenous, Q6H PRN, Rigoberto Law MD, 4 mg at 04/05/22 2208  •  pantoprazole (PROTONIX) EC tablet 40 mg, 40 mg, Oral, BID AC, Rigoberto Law MD, 40 mg at 04/06/22 0850  •  pravastatin (PRAVACHOL) tablet 10 mg, 10 mg, Oral, Nightly, Rigoberto Law MD, 10 mg at 04/05/22 2114  •  prochlorperazine (COMPAZINE) injection 10 mg, 10 mg, Intravenous, Q6H PRN, Rigoberto Law MD, 10 mg at 04/05/22 1642  •  sodium bicarbonate tablet 650 mg, 650 mg, Oral, TID, Rigoberto Law MD, 650 mg at 04/06/22 0849  •  [COMPLETED] Insert peripheral IV, , , Once **AND** sodium chloride 0.9 % flush 10 mL, 10 mL, Intravenous, PRN, [place], Waldo MAE MD, 10 mL at 04/05/22 2114  •  valsartan (DIOVAN) tablet 320 mg, 320 mg, Oral, Q24H, Rigoberto Law MD, 320 mg at 04/06/22 0849  Review of Systems:    The following systems were reviewed and negative;  constitution, respiratory, musculoskeletal and neurological    Objective     Vital Signs  Temp:  [97 °F (36.1 °C)-98.9 °F (37.2 °C)] 97 °F (36.1 °C)  Heart Rate:  [] 88  Resp:  [20] 20  BP: (112-180)/(54-93) 172/76  Body mass index is 27.94 kg/m².    Intake/Output Summary (Last 24 hours) at 4/6/2022 1127  Last data filed at 4/5/2022 2115  Gross per 24 hour   Intake 120 ml   Output --   Net 120  ml     No intake/output data recorded.     Physical Exam:   General: patient awake, alert and cooperative   Eyes: Normal lids and lashes, no scleral icterus   Neck: supple, normal ROM   Skin: warm and dry, not jaundiced   Cardiovascular: regular rhythm and rate, no murmurs auscultated   Pulm: clear to auscultation bilaterally, regular and unlabored   Abdomen: soft, nontender, nondistended; normal bowel sounds   Extremities: no rash or edema   Psychiatric: Normal mood and behavior; memory intact     Results Review:     I reviewed the patient's new clinical results.    Results from last 7 days   Lab Units 04/06/22  0557 04/05/22  0642 04/04/22  0821   WBC 10*3/mm3 20.41* 13.22* 9.67   HEMOGLOBIN g/dL 11.2* 10.9* 9.8*   HEMATOCRIT % 34.7 33.4* 29.2*   PLATELETS 10*3/mm3 443 461* 404     Results from last 7 days   Lab Units 04/06/22  0557 04/05/22  0641 04/04/22  0821 04/02/22  0559 04/01/22  1503   SODIUM mmol/L 137 136 135*   < > 138   POTASSIUM mmol/L 5.0 5.2 5.1   < > 4.1   CHLORIDE mmol/L 104 106 106   < > 105   CO2 mmol/L 17.2* 16.2* 19.1*   < > 17.0*   BUN mg/dL 12 11 14   < > 11   CREATININE mg/dL 0.92 0.83 1.04*   < > 0.93   CALCIUM mg/dL 8.6 8.4 7.8*   < > 8.4   BILIRUBIN mg/dL  --   --   --   --  0.4   ALK PHOS U/L  --   --   --   --  218*   ALT (SGPT) U/L  --   --   --   --  30   AST (SGOT) U/L  --   --   --   --  28   GLUCOSE mg/dL 256* 193* 147*   < > 214*    < > = values in this interval not displayed.         Lab Results   Lab Value Date/Time    LIPASE 54 04/01/2022 1503    LIPASE 36 03/16/2022 1536       Radiology:  FL Video Swallow With Speech Single Contrast   Final Result   Fluoroscopy was provided for the speech pathologist during a   video swallow study. For full details please see the speech pathology   report.       This report was finalized on 4/4/2022 3:25 PM by Dr. Marissa Rondon M.D.          FL Esophagram Complete Single Contrast   Final Result          Assessment/Plan     Patient Active  Problem List   Diagnosis   • Diabetic ketoacidosis without coma associated with type 2 diabetes mellitus (HCC)   • Pneumonia of left lower lobe due to infectious organism   • Acute UTI       Assessment/Recommendations:    Assessment:  1. Dysphagia of unclear etiology with complaints of choking/gagging  2. History of pneumonia  3. History of UTI   4. Elevated WBC  5. Elevated troponin - Cardiology is seeing her.  6.  She is on Plavix.  7. Intermittent nausea and vomiting.     Plan:  -Case discussed with Dr. Law. He is interested in having an EGD done. We will hold off for now till more stable.   -I will recheck a chest x-ray.  -Await the results of her cardiology evaluation.  -I will recheck her CBC.    I discussed the patients findings and my recommendations with patient, nursing staff and primary care team.    Victorino Garcia MD

## 2022-04-06 NOTE — CASE MANAGEMENT/SOCIAL WORK
Continued Stay Note  [facility]     Patient Name: Gisela Gandara  MRN: [id number]  Today's Date: 4/6/2022    Admit Date: 4/1/2022     Discharge Plan     Row Name 04/06/22 0818       Plan    Plan Plan is retun to IC level at [facility] upon AL.....[place] HAYES/ BRENDA               Discharge Codes    No documentation.               Expected Discharge Date and Time     Expected Discharge Date Expected Discharge Time    Apr 6, 2022             Magnloia Bosch RN

## 2022-04-07 NOTE — NURSING NOTE
Critical Ptt called from lab, sample taken out of left arm where heparin is infusing but paused, requested stat redrawn from opposite arm.

## 2022-04-07 NOTE — PROGRESS NOTES
Gastroenterology   Inpatient Progress Note    Reason for Follow Up:  Choking, unable to eat    Subjective  Interval History:   Has just returned from CT scan of the chest.  She reports some abdominal discomfort but overall is tired after CT scan, no nausea or vomiting.    Current Facility-Administered Medications:   •  acetaminophen (TYLENOL) tablet 650 mg, 650 mg, Oral, Q6H PRN, Rigoberto Law MD, 650 mg at 04/07/22 1014  •  aluminum sulfate-calcium acetate (DOMEBORO) topical packet 1 packet, 1 packet, Topical, Q8H, Rigoberto Law MD, 1 packet at 04/07/22 1549  •  amLODIPine (NORVASC) tablet 10 mg, 10 mg, Oral, Q24H, Rigoberto Law MD, 10 mg at 04/07/22 0951  •  atorvastatin (LIPITOR) tablet 40 mg, 40 mg, Oral, Nightly, Marleny Bermudez APRN, 40 mg at 04/06/22 2009  •  cloNIDine (CATAPRES) tablet 0.3 mg, 0.3 mg, Oral, Q8H, Rigoberto Law MD, 0.3 mg at 04/07/22 1558  •  clopidogrel (PLAVIX) tablet 75 mg, 75 mg, Oral, Daily, Marleny Bermudez APRN, 75 mg at 04/07/22 0952  •  fluconazole (DIFLUCAN) tablet 100 mg, 100 mg, Oral, Q24H, Micheal Thompson MD, 100 mg at 04/07/22 1548  •  FLUoxetine (PROzac) capsule 20 mg, 20 mg, Oral, Daily, Rigoberto Law MD, 20 mg at 04/07/22 0951  •  gabapentin (NEURONTIN) capsule 100 mg, 100 mg, Oral, Nightly, Rigoberto Law MD, 100 mg at 04/06/22 2009  •  heparin (porcine) 5000 UNIT/ML injection 2,100-4,000 Units, 30-57.7 Units/kg, Intravenous, Q6H PRN, Marleny Bermudez APRN, 4,000 Units at 04/07/22 1029  •  heparin 89105 units/250 mL (100 units/mL) in 0.45 % NaCl infusion, 12 Units/kg/hr, Intravenous, Titrated, Marleny Bermudez, APRN, Last Rate: 13.4 mL/hr at 04/07/22 1030, 19.336 Units/kg/hr at 04/07/22 1030  •  hydrALAZINE (APRESOLINE) tablet 100 mg, 100 mg, Oral, Q8H, Rigoberto Law MD, 100 mg at 04/07/22 1548  •  HYDROcodone-acetaminophen (NORCO) 5-325 MG per tablet 1 tablet, 1 tablet, Oral, Q6H PRN, Rigoberto Law MD, 1 tablet at 04/07/22 1620  •  hydrocortisone-bacitracin-zinc  oxide-nystatin (MAGIC BARRIER) ointment 1 application, 1 application, Topical, PRN, Rigoberto Law MD, 1 application at 04/06/22 0634  •  HYDROmorphone (DILAUDID) injection 0.5 mg, 0.5 mg, Intravenous, Q4H PRN, Rigoberto Law MD, 0.5 mg at 04/07/22 0850  •  insulin lispro (ADMELOG) injection 0-7 Units, 0-7 Units, Subcutaneous, 4x Daily With Meals & Nightly, Rigoberto Law MD, 3 Units at 04/07/22 1150  •  melatonin tablet 3 mg, 3 mg, Oral, Nightly PRN, Rigoberto Law MD  •  metoprolol tartrate (LOPRESSOR) injection 5 mg, 5 mg, Intravenous, Q6H PRN, Rigoberto Law MD, 5 mg at 04/05/22 1857  •  metoprolol tartrate (LOPRESSOR) tablet 12.5 mg, 12.5 mg, Oral, Q12H, Rigoberto Law MD, 12.5 mg at 04/07/22 0952  •  nitroglycerin (NITROSTAT) ointment 1 inch, 1 inch, Topical, Q6H, Marleny Bermudez, APRN, 1 inch at 04/07/22 1549  •  ondansetron (ZOFRAN) injection 4 mg, 4 mg, Intravenous, Q6H PRN, Rigoberto Law MD, 4 mg at 04/07/22 0537  •  pantoprazole (PROTONIX) EC tablet 40 mg, 40 mg, Oral, BID AC, Rigoberto Law MD, 40 mg at 04/07/22 0952  •  Pharmacy to dose vancomycin, , Does not apply, Continuous PRN, Micheal Thompson MD  •  piperacillin-tazobactam (ZOSYN) 3.375 g in iso-osmotic dextrose 50 ml (premix), 3.375 g, Intravenous, Q8H, Micheal Thompson MD, 3.375 g at 04/07/22 1551  •  prochlorperazine (COMPAZINE) injection 10 mg, 10 mg, Intravenous, Q6H PRN, Rigoberto Law MD, 10 mg at 04/07/22 0741  •  sodium bicarbonate tablet 1,300 mg, 1,300 mg, Oral, TID, Rigoberto Law MD, 1,300 mg at 04/07/22 1549  •  [COMPLETED] Insert peripheral IV, , , Once **AND** sodium chloride 0.9 % flush 10 mL, 10 mL, Intravenous, PRN, Elia, Waldo MAE MD, 10 mL at 04/06/22 2009  •  sodium chloride 0.9 % infusion, 75 mL/hr, Intravenous, Continuous, Rigoberto Law MD, Last Rate: 75 mL/hr at 04/06/22 1440, 75 mL/hr at 04/06/22 1440  •  valsartan (DIOVAN) tablet 320 mg, 320 mg, Oral, Q24H, Rigoberto Law MD, 320 mg at 04/07/22 0951  •  vancomycin 750 mg/250 mL 0.9%  NS IVPB ([facility]), 750 mg, Intravenous, Q24H, Micheal Thompson MD  Review of Systems:               Gastroenterology positive for mild lower abdominal discomfort    Objective     Vital Signs  Temp:  [97.7 °F (36.5 °C)-98.2 °F (36.8 °C)] 97.7 °F (36.5 °C)  Heart Rate:  [] 83  Resp:  [18-19] 18  BP: (123-167)/(49-86) 127/57  Body mass index is 27.94 kg/m².                  Physical Exam:              General: patient awake, alert and cooperative              Eyes: no scleral icterus              Skin: warm and dry, not jaundiced              Abdomen: soft, nontender, nondistended; normal bowel sounds              Psychiatric: Appropriate affect and behavior                Results Review:                I reviewed the patient's new clinical results.    Results from last 7 days   Lab Units 04/07/22  0604 04/06/22  1233 04/06/22  0557   WBC 10*3/mm3 18.77* 20.00* 20.41*   HEMOGLOBIN g/dL 11.2* 11.6* 11.2*   HEMATOCRIT % 35.2 35.9 34.7   PLATELETS 10*3/mm3 437 469* 443     Results from last 7 days   Lab Units 04/07/22  0604 04/06/22  0557 04/05/22  0641 04/02/22  0559 04/01/22  1503   SODIUM mmol/L 140 137 136   < > 138   POTASSIUM mmol/L 4.4 5.0 5.2   < > 4.1   CHLORIDE mmol/L 105 104 106   < > 105   CO2 mmol/L 14.3* 17.2* 16.2*   < > 17.0*   BUN mg/dL 18 12 11   < > 11   CREATININE mg/dL 0.88 0.92 0.83   < > 0.93   CALCIUM mg/dL 8.1* 8.6 8.4   < > 8.4   BILIRUBIN mg/dL 0.3  --   --   --  0.4   ALK PHOS U/L 240*  --   --   --  218*   ALT (SGPT) U/L 39*  --   --   --  30   AST (SGOT) U/L 57*  --   --   --  28   GLUCOSE mg/dL 162* 256* 193*   < > 214*    < > = values in this interval not displayed.     Results from last 7 days   Lab Units 04/06/22  1102   INR  1.29*     Lab Results   Lab Value Date/Time    LIPASE 82 (H) 04/07/2022 0604    LIPASE 54 04/01/2022 1503    LIPASE 36 03/16/2022 1536       Radiology:  CT Chest Without Contrast Diagnostic   Final Result       1.  Near complete resolution of left lower lobe airspace  disease with   residual groundglass nodular opacities suggest acute on chronic   pneumonitis. Recommend follow-up to resolution after treatment.   2.  The proximal esophagus is fluid-filled, risk for aspiration.   3.  Redemonstration of mildly prominent but not abnormally dilated   fluid-filled loops of small bowel incompletely imaged suggestive of   enteritis with ileus. Recommend close follow-up and dedicated imaging of   the abdomen if there is concern for developing partial obstruction.   4.  Additional findings as above.       This report was finalized on 4/7/2022 3:21 PM by Dr. Gisela Banda M.D.          XR Chest 1 View   Final Result   1. No definite active disease is seen in the chest. There are low lung   volumes with horizontal linear atelectasis at the lung bases. Recent   chest CT 03/16/2022 demonstrate multifocal small patchy airspace   opacities in the superior segment of the left lower lobe in the   periphery of the left lower lung and left lung base that are not   discernible on this current chest x-ray but may be difficult to   appreciate on standard chest x-ray.   2. 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       This report was finalized on 4/6/2022 1:10 PM by Dr. Robert Talbot M.D.          FL Video Swallow With Speech Single Contrast   Final Result   Fluoroscopy was provided for the speech pathologist during a   video swallow study. For full details please see the speech pathology   report.       This report was finalized on 4/4/2022 3:25 PM by Dr. Marissa Rondon M.D.          FL Esophagram Complete Single Contrast   Final Result          Assessment/Plan     Patient Active Problem List   Diagnosis   • Diabetic ketoacidosis without coma associated with type 2 diabetes mellitus (HCC)   • Pneumonia of left lower lobe due to infectious organism   • Acute UTI   • NSTEMI (non-ST elevated myocardial infarction) ([facility])       Assessment:  1. Dysphagia, unclear  etiology with symptoms of choking and gagging  2. History of pneumonia  3. Intermittent nausea and vomiting  4. Elevated WBC  5. Anticoagulated on Plavix  6. Elevated troponin, cardiology following  7. History of UTI      Plan:  · We will await CT scan chest results.  · Continue to monitor CBC  · Prior to EGD, patient would need clearance from primary care provider and cardiology and Plavix would need to be held.  · She has been evaluated by speech therapy patient with mild to moderate oral dysphagia with slow mastication and diffuse oral residue, no penetration or aspiration observed.  Esophageal scan completed with soft solids which showed slow clearance of lower esophagus requiring liquid wash.    I discussed the patients findings and my recommendations with patient.           Harper BEE  [facility]  [address] [place] [address]

## 2022-04-07 NOTE — PROGRESS NOTES
[facility]  Cardiology Progress Note    Patient Identification:  Name: Gisela Gandara  Age: 89 y.o.  Sex: female  :  1932  MRN: [id number]                 Follow Up / Chief Complaint: Follow-up for tachycardia and abnormal ECG    Interval History: Not a candidate for further work-up.       Subjective: Denies chest pain, and shortness of breath.  She states her stomach hurts a little bit.      Objective:    Past Medical History:  Past Medical History:   Diagnosis Date   • Arthritis    • Diabetes mellitus (HCC)    • Hyperlipidemia    • Hypertension      Past Surgical History:  No past surgical history on file.     Social History:   Social History     Tobacco Use   • Smoking status: Former Smoker   • Smokeless tobacco: Former User   Substance Use Topics   • Alcohol use: Not on file      Family History:  No family history on file.       Allergies:  No Known Allergies  Scheduled Meds:  aluminum sulfate-calcium acetate, 1 packet, Q8H  amLODIPine, 10 mg, Q24H  atorvastatin, 40 mg, Nightly  cloNIDine, 0.3 mg, Q8H  clopidogrel, 75 mg, Daily  fluconazole, 100 mg, Q24H  FLUoxetine, 20 mg, Daily  gabapentin, 100 mg, Nightly  hydrALAZINE, 100 mg, Q8H  insulin lispro, 0-7 Units, 4x Daily With Meals & Nightly  metoprolol tartrate, 12.5 mg, Q12H  nitroglycerin, 1 inch, Q6H  pantoprazole, 40 mg, BID AC  piperacillin-tazobactam, 3.375 g, Once  piperacillin-tazobactam, 3.375 g, Q8H  sodium bicarbonate, 650 mg, TID  valsartan, 320 mg, Q24H  vancomycin, 20 mg/kg, Once  vancomycin, 750 mg, Q24H            INTAKE AND OUTPUT:    Intake/Output Summary (Last 24 hours) at 2022 1024  Last data filed at 2022 2009  Gross per 24 hour   Intake 1120 ml   Output 600 ml   Net 520 ml       Review of Systems:   GI: Denies nausea and vomiting  Cardiac: Denies chest pain or palpitations  Pulmonary: Denies shortness of breath and cough    Constitutional:  Temp:  [97.7 °F (36.5 °C)-98.2 °F (36.8 °C)] 97.9 °F (36.6  °C)  Heart Rate:  [] 95  Resp:  [18-19] 18  BP: (143-167)/(60-98) 152/60        Physical Exam:  General:  Appears in no acute distress, resting in bed  Eyes: EOM normal and no conjunctival drainage  HEENT:  No JVD. Thyroid not visibly enlarged. No mucosal pallor or cyanosis  Respiratory: Respirations regular and unlabored at rest. BBS with good air entry in all fields. No crackles, rubs or wheezes auscultated  Cardiovascular: S1S2 Regular rate and rhythm. No murmur, rub or gallop. No carotid bruits. DP/PT pulses   2+  . No pretibial pitting edema  Gastrointestinal: Abdomen soft, flat, non tender. Bowel sounds present. No hepatosplenomegaly. No ascites  Skin:   Skin warm and dry to touch. No rashes    Neuro: AAO x3 CN II-XII grossly intact  Psych: Mood and affect normal, pleasant and cooperative          I reviewed the patient's new clinical results, and personally reviewed and interpreted the patient's ECG and telemetry data from the last 24 hours      Cardiographics            Comparison 3/22/22       Telemetry:        Echocardiogram:   Interpretation Summary     · Estimated right ventricular systolic pressure from tricuspid regurgitation is mildly elevated (35-45 mmHg).  · Mild aortic valve stenosis is present.  · Calculated left ventricular EF = 78.5% Estimated left ventricular EF was in agreement with the calculated left ventricular EF.  · Left ventricular diastolic function is consistent with (grade I) impaired relaxation.  · The right atrial cavity is borderline dilated.  · There is no evidence of pericardial effusion. .        Imaging        CONCLUSION: Negative esophagram. 103 images were obtained and the  fluoroscopy time measures 32 seconds.     This report was finalized on 4/2/2022 4:14 PM by Dr. Ran Villalta M.D.         Lab Review   Results from last 7 days   Lab Units 04/07/22  0604 04/06/22  1233 04/06/22  0557   TROPONIN T ng/mL 0.253* 0.470* 0.335*         Results from last 7 days   Lab  "Units 04/07/22  0604   SODIUM mmol/L 140   POTASSIUM mmol/L 4.4   BUN mg/dL 18   CREATININE mg/dL 0.88   CALCIUM mg/dL 8.1*        Results from last 7 days   Lab Units 04/07/22  0604 04/06/22  1233 04/06/22  0557   WBC 10*3/mm3 18.77* 20.00* 20.41*   HEMOGLOBIN g/dL 11.2* 11.6* 11.2*   HEMATOCRIT % 35.2 35.9 34.7   PLATELETS 10*3/mm3 437 469* 443     Results from last 7 days   Lab Units 04/07/22  0604 04/06/22  2257 04/06/22  1102   INR   --   --  1.29*   APTT seconds 33.2 31.4 30.3     The following medical decision was discussed in detail with Dr. Martinez      Assessment:    Oropharyngeal dysphagia  Acute UTI  Diabetes mellitus  Hypertension  Hyperlipidemia  Elevated troponin  ACS: QRS and T wave morphology similiar to previous EKG on 3/22/2022.  Nitropaste resolved back discomfort.      Blood pressure and heart rate are stable.  Liver enzymes slightly elevated.  May need to decrease or discontinue statin.  We will do CMP tomorrow.  Continue Plavix, statin, heparin, ARB, clonidine, hydralazine, beta-blockade, and amlodipine.  Most recent echo revealed EF 78%, mild AV stenosis, and grade 1 LV function.  She is not a candidate for any further work-up.      abs/tests ordered for am: cmp    )4/7/2022  ROHIT Allen/Transcription:   \"Dictated utilizing Dragon dictation\".     "

## 2022-04-07 NOTE — CONSULTS
CONSULT NOTE    Infectious Diseases - Micheal Blackwood MD  [facility]       Patient Identification:  Name: Gisela Gandara  Age: 89 y.o.  Sex: female  :  1932  MRN: [id number]             Date of Consultation: 2022      Primary Care Physician: Rigoberto Law MD                               Requesting Physician: Dr. Law  Reason for Consultation: Sepsis    Impression: Patient is a 89-year-old female with complicated past medical history including history of peripheral vascular disease for which she had right above-knee amputation long time ago, diabetes mellitus hypertension dyslipidemia gastroesophageal reflux disease and recent hospitalization for urinary tract infection and pneumonia presented to the emergency room after an episode of choking and difficulty swallowing and subsequent generalized weakness.  Patient is also complaining of back pain for the last 7 to 10 days.  Patient is not feeling very well.  During this hospitalization since 2022 patient was initially thought to have urinary tract infection for which she was treated with Rocephin and subsequently urine culture did come back positive for yeast for which she was switched to fluconazole.  Despite these interventions patient continued to feel poorly and noted to have rising white blood cell count after initial normalization.  Because of the progressive rising white blood cell count despite appropriate management infectious disease service is consulted.  Chest x-ray performed on 2022 did not show any definitive pneumonia.  Patient was also noted to have rising troponin for which cardiology service is consulted.  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  A-this presentation of progressive leukocytosis after initial improvement in the setting of episode of choking that occurred earlier and now rising troponin level and ongoing back pain and associated sense of ill health  despite appropriate treatment of identified infectious processes concerning for:  1-recurrent healthcare associated pneumonia  2-reactive leukocytosis due to acute coronary syndrome  3-possibility of infected lumbar spine with discitis and osteomyelitis  4-evolving C. difficile infection given the fact the patient has had multiple courses of antibiotics in the recent few weeks.  5-monitor closely for possibility of obstructive  process.  B-possible acute non-ST segment elevation MI  c-peripheral vascular disease with history of right AKA  D-other diagnosis per primary team      Recommendations/Discussions:   At this juncture I would recommend broadening the antibiotic spectrum to cover for healthcare associated pneumonia.  Check inflammatory markers and low threshold to perform imaging involving the lumbar spine to rule out discitis and osteomyelitis.  Perform periodic review of systems and if she developed diarrhea or abdominal discomfort check stool for C. difficile toxin assay.  Management of cardiac issues and other medical issues per primary team.  Thank you Dr. Banda for letting me be the part of your patient care please see above impression and recommendations      History of Present Illness:   Patient is a 89-year-old female with complicated past medical history including history of peripheral vascular disease for which she had right above-knee amputation long time ago, diabetes mellitus hypertension dyslipidemia gastroesophageal reflux disease and recent hospitalization for urinary tract infection and pneumonia presented to the emergency room after an episode of choking and difficulty swallowing and subsequent generalized weakness.  Patient is also complaining of back pain for the last 7 to 10 days.  Patient is not feeling very well.  During this hospitalization since 4/1/2022 patient was initially thought to have urinary tract infection for which she was treated with Rocephin and subsequently urine  culture did come back positive for yeast for which she was switched to fluconazole.  Despite these interventions patient continued to feel poorly and noted to have rising white blood cell count after initial normalization.  Because of the progressive rising white blood cell count despite appropriate management infectious disease service is consulted.  Chest x-ray performed on 4/6/2022 did not show any definitive pneumonia.  Patient was also noted to have rising troponin for which cardiology service is consulted.  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      Past Medical History:  Past Medical History:   Diagnosis Date   • Arthritis    • Diabetes mellitus (HCC)    • Hyperlipidemia    • Hypertension      Past Surgical History:  No past surgical history on file.   Home Meds:  Medications Prior to Admission   Medication Sig Dispense Refill Last Dose   • acetaminophen (TYLENOL) 500 MG tablet Take 500 mg by mouth Every 6 (Six) Hours As Needed for Mild Pain .      • amLODIPine (NORVASC) 10 MG tablet Take 1 tablet by mouth Daily for 30 days. 30 tablet 0    • aspirin 81 MG EC tablet Take 81 mg by mouth Daily.      • cloNIDine (CATAPRES) 0.2 MG tablet Take 1 tablet by mouth Every 8 (Eight) Hours for 30 days. 90 tablet 0    • FLUoxetine (PROzac) 20 MG capsule Take 20 mg by mouth Daily.      • gabapentin (NEURONTIN) 100 MG capsule Take 100 mg by mouth Every Night.      • guaiFENesin (ROBITUSSIN) 100 MG/5ML solution oral solution Take 200 mg by mouth Every 4 (Four) Hours As Needed.      • hydrALAZINE (APRESOLINE) 50 MG tablet Take 1 tablet by mouth Every 8 (Eight) Hours for 30 days. 90 tablet 0    • insulin lispro (humaLOG) 100 UNIT/ML injection Inject 0-24 Units under the skin into the appropriate area as directed 4 (Four) Times a Day With Meals & at Bedtime.  12    • melatonin 3 MG tablet Take 3 mg by mouth Every Night.      • omeprazole (priLOSEC) 20 MG capsule Take 20 mg by mouth 2  (Two) Times a Day.      • potassium chloride (K-DUR,KLOR-CON) 20 MEQ CR tablet Take 1 tablet by mouth 2 (Two) Times a Day With Meals for 30 days. 60 tablet 0    • pravastatin (PRAVACHOL) 10 MG tablet Take 1 tablet by mouth Every Night for 30 days. 30 tablet 0    • valsartan (DIOVAN) 320 MG tablet Take 1 tablet by mouth Daily for 30 days. 30 tablet 0      Current Meds:     Current Facility-Administered Medications:   •  acetaminophen (TYLENOL) tablet 650 mg, 650 mg, Oral, Q6H PRN, Rigoberto Law MD, 650 mg at 04/05/22 2124  •  aluminum sulfate-calcium acetate (DOMEBORO) topical packet 1 packet, 1 packet, Topical, Q8H, Rigoberto Law MD, 1 packet at 04/07/22 0539  •  amLODIPine (NORVASC) tablet 10 mg, 10 mg, Oral, Q24H, Rigoberto Law MD, 10 mg at 04/06/22 0849  •  atorvastatin (LIPITOR) tablet 40 mg, 40 mg, Oral, Nightly, Marleny Bermudez APRN, 40 mg at 04/06/22 2009  •  cloNIDine (CATAPRES) tablet 0.3 mg, 0.3 mg, Oral, Q8H, Rigoberto Law MD, 0.3 mg at 04/07/22 0539  •  clopidogrel (PLAVIX) tablet 75 mg, 75 mg, Oral, Daily, Marleny Bermudez APRN, 75 mg at 04/06/22 0850  •  fluconazole (DIFLUCAN) tablet 100 mg, 100 mg, Oral, Q24H, Rigoberto Law MD, 100 mg at 04/05/22 1343  •  FLUoxetine (PROzac) capsule 20 mg, 20 mg, Oral, Daily, Rigoberto Law MD, 20 mg at 04/06/22 0849  •  gabapentin (NEURONTIN) capsule 100 mg, 100 mg, Oral, Nightly, Rigoberto Law MD, 100 mg at 04/06/22 2009  •  heparin (porcine) 5000 UNIT/ML injection 2,100-4,000 Units, 30-57.7 Units/kg, Intravenous, Q6H PRN, Marleny Bermudez APRN, 4,000 Units at 04/06/22 2347  •  heparin 71347 units/250 mL (100 units/mL) in 0.45 % NaCl infusion, 12 Units/kg/hr, Intravenous, Titrated, Marleny Bermudez APRN, Last Rate: 10.39 mL/hr at 04/06/22 2348, 15 Units/kg/hr at 04/06/22 2348  •  hydrALAZINE (APRESOLINE) tablet 100 mg, 100 mg, Oral, Q8H, Rigoberto Law MD, 100 mg at 04/07/22 0539  •  HYDROcodone-acetaminophen (NORCO) 5-325 MG per tablet 1 tablet, 1 tablet, Oral,  Q6H PRN, Rigoberto Law MD, 1 tablet at 04/07/22 0537  •  hydrocortisone-bacitracin-zinc oxide-nystatin (MAGIC BARRIER) ointment 1 application, 1 application, Topical, PRN, Rigoberto Law MD, 1 application at 04/06/22 0634  •  HYDROmorphone (DILAUDID) injection 0.5 mg, 0.5 mg, Intravenous, Q4H PRN, Rigoberto Law MD  •  insulin lispro (ADMELOG) injection 0-7 Units, 0-7 Units, Subcutaneous, 4x Daily With Meals & Nightly, Rigoberto Law MD, 3 Units at 04/06/22 1351  •  melatonin tablet 3 mg, 3 mg, Oral, Nightly PRN, Rigoberto Law MD  •  metoprolol tartrate (LOPRESSOR) injection 5 mg, 5 mg, Intravenous, Q6H PRN, Rigoberto Law MD, 5 mg at 04/05/22 1857  •  metoprolol tartrate (LOPRESSOR) tablet 12.5 mg, 12.5 mg, Oral, Q12H, Rigoberto Law MD, 12.5 mg at 04/06/22 2009  •  nitroglycerin (NITROSTAT) ointment 1 inch, 1 inch, Topical, Q6H, Marleny Bermudez, APRN, 1 inch at 04/06/22 2011  •  ondansetron (ZOFRAN) injection 4 mg, 4 mg, Intravenous, Q6H PRN, Rigoberto Law MD, 4 mg at 04/07/22 0537  •  pantoprazole (PROTONIX) EC tablet 40 mg, 40 mg, Oral, BID AC, Rigoberto Law MD, 40 mg at 04/06/22 0850  •  prochlorperazine (COMPAZINE) injection 10 mg, 10 mg, Intravenous, Q6H PRN, Rigoberto Law MD, 10 mg at 04/07/22 0741  •  sodium bicarbonate tablet 650 mg, 650 mg, Oral, TID, Rigoberto Law MD, 650 mg at 04/06/22 2009  •  [COMPLETED] Insert peripheral IV, , , Once **AND** sodium chloride 0.9 % flush 10 mL, 10 mL, Intravenous, PRN, Elia, Waldo MAE MD, 10 mL at 04/06/22 2009  •  sodium chloride 0.9 % infusion, 75 mL/hr, Intravenous, Continuous, Rigoberto Law MD, Last Rate: 75 mL/hr at 04/06/22 1440, 75 mL/hr at 04/06/22 1440  •  valsartan (DIOVAN) tablet 320 mg, 320 mg, Oral, Q24H, Rigoberto Law MD, 320 mg at 04/06/22 0849  Allergies:  No Known Allergies  Social History:   Social History     Tobacco Use   • Smoking status: Former Smoker   • Smokeless tobacco: Former User   Substance Use Topics   • Alcohol use: Not on file      Family  "History:  No family history on file.       Review of Systems  See history of present illness and past medical history.  Complaining of back pain.    Remainder of ROS is negative.      Vitals:   /71 (BP Location: Left arm, Patient Position: Lying)   Pulse 102   Temp 97.9 °F (36.6 °C) (Oral)   Resp 18   Ht 157.5 cm (62.01\")   Wt 69.3 kg (152 lb 12.5 oz)   SpO2 98%   BMI 27.94 kg/m²   I/O:     Intake/Output Summary (Last 24 hours) at 4/7/2022 0835  Last data filed at 4/6/2022 2009  Gross per 24 hour   Intake 1120 ml   Output 600 ml   Net 520 ml     Exam:  Patient is examined using the personal protective equipment as per guidelines from infection control for this particular patient as enacted.  Hand washing was performed before and after patient interaction.  General Appearance:   Chronically ill female who does not appear to be in any acute distress   Head:    Normocephalic, without obvious abnormality, atraumatic   Eyes:    PERRL, conjunctivae/corneas clear, EOM's intact, both eyes   Ears:    Normal external ear canals, both ears   Nose:   Nares normal, septum midline, mucosa normal, no drainage    or sinus tenderness   Throat:   Lips, tongue, gums normal; oral mucosa pink and moist   Neck:  No significant tenderness noted   Back:     Symmetric, no curvature, ROM normal, no CVA tenderness   Lungs:     Clear to auscultation bilaterally, respirations unlabored   Chest Wall:    No tenderness or deformity    Heart:    Regular rate and rhythm, S1 and S2 normal, no murmur, rub   or gallop   Abdomen:    Obese soft nontender   Extremities:  Right AKA   Pulses:   Pulses palpable in all extremities; symmetric all extremities   Skin:   Skin color normal, Skin is warm and dry,  no rashes or palpable lesions   Neurologic:  Grossly nonfocal examination       Data Review:    I reviewed the patient's new clinical results.  Results from last 7 days   Lab Units 04/07/22  0604 04/06/22  1233 04/06/22  0557 04/05/22  0642 " 04/04/22  0821 04/03/22  0722 04/02/22  0559   WBC 10*3/mm3 18.77* 20.00* 20.41* 13.22* 9.67 10.96* 9.12   HEMOGLOBIN g/dL 11.2* 11.6* 11.2* 10.9* 9.8* 10.0* 9.8*   PLATELETS 10*3/mm3 437 469* 443 461* 404 415 388     Results from last 7 days   Lab Units 04/07/22  0604 04/06/22  0557 04/05/22  0641 04/04/22  0821 04/03/22  0722 04/02/22  0559 04/01/22  1503   SODIUM mmol/L 140 137 136 135* 136 138 138   POTASSIUM mmol/L 4.4 5.0 5.2 5.1 4.6 3.8 4.1   CHLORIDE mmol/L 105 104 106 106 107 108* 105   CO2 mmol/L 14.3* 17.2* 16.2* 19.1* 19.0* 20.0* 17.0*   BUN mg/dL 18 12 11 14 12 10 11   CREATININE mg/dL 0.88 0.92 0.83 1.04* 1.06* 0.78 0.93   CALCIUM mg/dL 8.1* 8.6 8.4 7.8* 7.9* 7.8* 8.4   GLUCOSE mg/dL 162* 256* 193* 147* 155* 163* 214*     Microbiology Results (last 10 days)     Procedure Component Value - Date/Time    CANDIDA AURIS SCREEN - Swab, Axilla Right, Axilla Left and Groin [[id number]]  (Normal) Collected: 04/03/22 1407    Lab Status: Preliminary result Specimen: Swab from Axilla Right, Axilla Left and Groin Updated: 04/06/22 1433     Candida Auris Screen Culture No Candida auris isolated at 3 days    COVID PRE-OP / PRE-PROCEDURE SCREENING ORDER (NO ISOLATION) - Swab, Nasopharynx [[id number]]  (Normal) Collected: 04/01/22 1618    Lab Status: Final result Specimen: Swab from Nasopharynx Updated: 04/01/22 1704    Narrative:      The following orders were created for panel order COVID PRE-OP / PRE-PROCEDURE SCREENING ORDER (NO ISOLATION) - Swab, Nasopharynx.  Procedure                               Abnormality         Status                     ---------                               -----------         ------                     COVID-19,TREVA MERLOS IN-HOUSE...[[id number]]  Normal              Final result                 Please view results for these tests on the individual orders.    COVID-19,TREVA MERLOS IN-HOUSE CEPHEID/ELBERT NP SWAB IN TRANSPORT MEDIA 8-12 HR TAT - Swab, Nasopharynx [[id number]]  (Normal) Collected:  04/01/22 1618    Lab Status: Final result Specimen: Swab from Nasopharynx Updated: 04/01/22 1704     COVID19 Not Detected    Narrative:      Fact sheet for providers: https://www.fda.gov/media/470953/download    Fact sheet for patients: https://www.fda.gov/media/045051/download    Test performed by PCR.    Urine Culture - Urine, Urine, Catheter [[id number]]  (Abnormal) Collected: 04/01/22 1526    Lab Status: Final result Specimen: Urine, Catheter Updated: 04/02/22 1515     Urine Culture Yeast isolated    Narrative:      No additional tests pending.            Assessment:  Active Hospital Problems    Diagnosis  POA   • NSTEMI (non-ST elevated myocardial infarction) (HCC) [I21.4]  Yes   • Acute UTI [N39.0]  Yes      Resolved Hospital Problems   No resolved problems to display.         Plan:  See above  Micheal Thompson MD   4/7/2022  08:35 EDT    Much of this encounter note is an electronic transcription/translation of spoken language to printed text. The electronic translation of spoken language may permit erroneous, or at times, nonsensical words or phrases to be inadvertently transcribed; Although I have reviewed the note for such errors, some may still exist

## 2022-04-07 NOTE — PROGRESS NOTES
"Daily progress note    Chief complaint  Doing better  No new complaints  Awake and alert  Denies nausea vomiting  Denies chest pain shortness of breath  Following all commands    History of present illness  89-year-old -American female who is well-known to our service with history of peripheral artery disease status post right AKA who also have diabetes mellitus hypertension hyperlipidemia gastroesophageal reflux disease who has been immobilized and was recently treated for UTI pneumonia and discharged back to nursing home in stable condition sent to the ER that she has had a choking episode and unable to eat with generalized weakness.  Patient evaluated in ER admit for further work-up.  Patient fully alert oriented denies any fever cough chest pain increase shortness of breath abdominal pain nausea vomiting diarrhea.     REVIEW OF SYSTEMS  Unremarkable except generalized weakness     PHYSICAL EXAM  Blood pressure 123/49, pulse 89, temperature 97.7 °F (36.5 °C), temperature source Oral, resp. rate 18, height 157.5 cm (62.01\"), weight 69.3 kg (152 lb 12.5 oz), SpO2 98 %.    GENERAL: Awake and alert, no acute distress  HENT: nares patent, oropharynx clear, dentures present  EYES: no scleral icterus, EOMI  CV: regular rhythm, normal rate, no murmur appreciated  RESPIRATORY: normal effort, lungs clear auscultation bilaterally  ABDOMEN: soft, nondistended, nontender throughout  MUSCULOSKELETAL: Status post right AKA, left lower extremity is normal to inspection  NEURO: alert, moves all extremities, follows commands  PSYCH:  calm, cooperative  SKIN: warm, dry     LAB RESULTS   Lab Results (last 24 hours)     Procedure Component Value Units Date/Time    POC Glucose Once [[id number]]  (Abnormal) Collected: 04/07/22 1134    Specimen: Blood Updated: 04/07/22 1137     Glucose 213 mg/dL      Comment: Meter: [id number] : [id number] Otf CA       C-reactive Protein [[id number]]  (Abnormal) Collected: " 04/07/22 0604    Specimen: Blood Updated: 04/07/22 1033     C-Reactive Protein 18.51 mg/dL     Comprehensive Metabolic Panel [[id number]]  (Abnormal) Collected: 04/07/22 0604    Specimen: Blood Updated: 04/07/22 0801     Glucose 162 mg/dL      BUN 18 mg/dL      Creatinine 0.88 mg/dL      Sodium 140 mmol/L      Potassium 4.4 mmol/L      Chloride 105 mmol/L      CO2 14.3 mmol/L      Calcium 8.1 mg/dL      Total Protein 6.2 g/dL      Albumin 2.20 g/dL      ALT (SGPT) 39 U/L      AST (SGOT) 57 U/L      Alkaline Phosphatase 240 U/L      Total Bilirubin 0.3 mg/dL      Globulin 4.0 gm/dL      A/G Ratio 0.6 g/dL      BUN/Creatinine Ratio 20.5     Anion Gap 20.7 mmol/L      eGFR 62.5 mL/min/1.73      Comment: National Kidney Foundation and American Society of Nephrology (ASN) Task Force recommended calculation based on the Chronic Kidney Disease Epidemiology Collaboration (CKD-EPI) equation refit without adjustment for race.       Narrative:      GFR Normal >60  Chronic Kidney Disease <60  Kidney Failure <15      Lipase [[id number]]  (Abnormal) Collected: 04/07/22 0604    Specimen: Blood Updated: 04/07/22 0754     Lipase 82 U/L     Troponin [[id number]]  (Abnormal) Collected: 04/07/22 0604    Specimen: Blood Updated: 04/07/22 0753     Troponin T 0.253 ng/mL     Narrative:      Troponin T Reference Range:  <= 0.03 ng/mL-   Negative for AMI  >0.03 ng/mL-     Abnormal for myocardial necrosis.  Clinicians would have to utilize clinical acumen, EKG, Troponin and serial changes to determine if it is an Acute Myocardial Infarction or myocardial injury due to an underlying chronic condition.       Results may be falsely decreased if patient taking Biotin.      aPTT [[id number]]  (Normal) Collected: 04/07/22 0604    Specimen: Blood Updated: 04/07/22 0744     PTT 33.2 seconds     CBC & Differential [[id number]]  (Abnormal) Collected: 04/07/22 0604    Specimen: Blood Updated: 04/07/22 0727    Narrative:      The following orders were  created for panel order CBC & Differential.  Procedure                               Abnormality         Status                     ---------                               -----------         ------                     CBC Auto Differential[[id number]]        Abnormal            Final result                 Please view results for these tests on the individual orders.    CBC Auto Differential [[id number]]  (Abnormal) Collected: 04/07/22 0604    Specimen: Blood Updated: 04/07/22 0727     WBC 18.77 10*3/mm3      RBC 3.87 10*6/mm3      Hemoglobin 11.2 g/dL      Hematocrit 35.2 %      MCV 91.0 fL      MCH 28.9 pg      MCHC 31.8 g/dL      RDW 15.7 %      RDW-SD 51.6 fl      MPV 10.5 fL      Platelets 437 10*3/mm3      Neutrophil % 79.8 %      Lymphocyte % 9.5 %      Monocyte % 9.4 %      Eosinophil % 0.5 %      Basophil % 0.2 %      Immature Grans % 0.6 %      Neutrophils, Absolute 15.00 10*3/mm3      Lymphocytes, Absolute 1.78 10*3/mm3      Monocytes, Absolute 1.76 10*3/mm3      Eosinophils, Absolute 0.09 10*3/mm3      Basophils, Absolute 0.03 10*3/mm3      Immature Grans, Absolute 0.11 10*3/mm3      nRBC 0.6 /100 WBC     POC Glucose Once [[id number]]  (Abnormal) Collected: 04/07/22 0615    Specimen: Blood Updated: 04/07/22 0616     Glucose 161 mg/dL      Comment: Meter: [id number] : [id number] Varun CA       aPTT [[id number]]  (Normal) Collected: 04/06/22 2257    Specimen: Blood Updated: 04/06/22 2334     PTT 31.4 seconds     POC Glucose Once [[id number]]  (Normal) Collected: 04/06/22 2123    Specimen: Blood Updated: 04/06/22 2124     Glucose 101 mg/dL      Comment: Meter: [id number] : [id number] Arnie CA       Blood Culture - Blood, Arm, Right [[id number]] Collected: 04/06/22 1633    Specimen: Blood from Arm, Right Updated: 04/06/22 1657    Blood Culture - Blood, Arm, Left [[id number]] Collected: 04/06/22 1637    Specimen: Blood from Arm, Left Updated: 04/06/22 1657    POC Glucose Once [[id number]]   (Normal) Collected: 04/06/22 1640    Specimen: Blood Updated: 04/06/22 1641     Glucose 110 mg/dL      Comment: Meter: [id number] : [id number] Ba Tonja NA       CANDIDA AURIS SCREEN - Swab, Axilla Right, Axilla Left and Groin [[id number]]  (Normal) Collected: 04/03/22 1407    Specimen: Swab from Axilla Right, Axilla Left and Groin Updated: 04/06/22 1433     Candida Auris Screen Culture No Candida auris isolated at 3 days        Imaging Results (Last 24 Hours)     Procedure Component Value Units Date/Time    CT Chest Without Contrast Diagnostic [[id number]] Collected: 04/07/22 1356     Updated: 04/07/22 1356    Narrative:      CT CHEST WITHOUT CONTRAST     HISTORY: 89-year-old with pneumonia. UTI. Dysphagia.     TECHNIQUE: Radiation dose reduction techniques were utilized, including  automated exposure control and exposure modulation based on body size.   3 mm images were obtained through the chest without the administration  of IV contrast. Lack of IV contrast limits evaluation of mediastinal,  hilar, and vascular structures. Sensitivity for underlying lesions and  infection decreased.     COMPARISON: 04/06/2022, 03/16/2022     FINDINGS: Artifact from the upper extremities included in the  field-of-view and motion.     Streak artifact from barium administered for video swallowing study.     Thoracic inlet: Heterogeneous thyroid. The central airway is patent.     Heart and great vessels: Cardiomegaly. Extensive atherosclerosis with  dense coronary calcifications and calcification about the mitral  annulus. No significant pericardial effusion. Vascular evaluation is  limited without IV contrast.     Lymphatics: Subcentimeter mediastinal nodes some of which are coarsely  calcified suggesting prior granulomatous disease. Hilar evaluation  limited without contrast     Lung parenchyma and pleural space: Resolution of previously described  pneumonia. There is some minimal residual groundglass nodular opacity  with  dependent bibasilar atelectasis and interstitial edema.  Nodularities most conspicuous in the left base on series 2 image 41. No  new consolidations, effusions, or pneumothorax.           Upper abdomen: [Artifact from previously administered barium layering  within the dependent portion of the stomach and with contrast extending  into mildly dilated loops of small bowel measuring top normal in the mid  abdomen up to 3 cm. There is contrast in the descending colon.     Circumferential thickening of the distal esophagus with a small hiatal  hernia and fluid extending into the upper thoracic trachea at the level  of the great vessels.     Stable cystic lesions involving both kidneys, the largest exophytic mass  in the right upper pole approximating 5 cm favoring a simple appearing  cyst. Extensive atherosclerosis abdominal aorta and branch vessels.  Cholecystectomy. Calcified granulomas liver and spleen which is  predominantly obscured by the contrast bolus. Indeterminate hypodense  liver lesions, unchanged, the largest in the hepatic dome approximating  1.9 cm favoring a cyst or hemangioma. Stable left adrenal nodule  measuring 1.3 cm favoring an adenoma (-5 Hounsfield units).        Bone windows: Diffuse bone demineralization with multilevel degenerative  changes. Extensive arthropathic changes shoulders.       Impression:         1.  Near complete resolution of left lower lobe airspace disease with  residual groundglass nodular opacities which may represent acute on  chronic pneumonitis.  2.  The proximal esophagus is fluid-filled, risk for aspiration.  3.  Mildly prominent but not abnormally dilated fluid-filled loops of  small bowel incompletely imaged. Please correlate for ileus or  enteritis. Dedicated imaging of the abdomen could be considered  particularly if there is concern for partial obstruction or abdominal  symptoms.  4.  Additional findings as above.                   Current Facility-Administered  Medications:   •  acetaminophen (TYLENOL) tablet 650 mg, 650 mg, Oral, Q6H PRN, Rigoberto Law MD, 650 mg at 04/07/22 1014  •  aluminum sulfate-calcium acetate (DOMEBORO) topical packet 1 packet, 1 packet, Topical, Q8H, Rigoberto Law MD, 1 packet at 04/07/22 0539  •  amLODIPine (NORVASC) tablet 10 mg, 10 mg, Oral, Q24H, Rigoberto Law MD, 10 mg at 04/07/22 0951  •  atorvastatin (LIPITOR) tablet 40 mg, 40 mg, Oral, Nightly, Marleny Bermudez APRN, 40 mg at 04/06/22 2009  •  cloNIDine (CATAPRES) tablet 0.3 mg, 0.3 mg, Oral, Q8H, Rigoberto Law MD, 0.3 mg at 04/07/22 0539  •  clopidogrel (PLAVIX) tablet 75 mg, 75 mg, Oral, Daily, Marleny Bermudez APRN, 75 mg at 04/07/22 0952  •  fluconazole (DIFLUCAN) tablet 100 mg, 100 mg, Oral, Q24H, Micheal Thompson MD, 100 mg at 04/05/22 1343  •  FLUoxetine (PROzac) capsule 20 mg, 20 mg, Oral, Daily, Rigoberto Law MD, 20 mg at 04/07/22 0951  •  gabapentin (NEURONTIN) capsule 100 mg, 100 mg, Oral, Nightly, Rigoberto Law MD, 100 mg at 04/06/22 2009  •  heparin (porcine) 5000 UNIT/ML injection 2,100-4,000 Units, 30-57.7 Units/kg, Intravenous, Q6H PRN, Marleny Bermudez APRN, 4,000 Units at 04/07/22 1029  •  heparin 18604 units/250 mL (100 units/mL) in 0.45 % NaCl infusion, 12 Units/kg/hr, Intravenous, Titrated, Marleny Bermudez APRN, Last Rate: 13.4 mL/hr at 04/07/22 1030, 19.336 Units/kg/hr at 04/07/22 1030  •  hydrALAZINE (APRESOLINE) tablet 100 mg, 100 mg, Oral, Q8H, Rigoberto Law MD, 100 mg at 04/07/22 0539  •  HYDROcodone-acetaminophen (NORCO) 5-325 MG per tablet 1 tablet, 1 tablet, Oral, Q6H PRN, Rigoberto Law MD, 1 tablet at 04/07/22 0537  •  hydrocortisone-bacitracin-zinc oxide-nystatin (MAGIC BARRIER) ointment 1 application, 1 application, Topical, PRN, Rigoberto Law MD, 1 application at 04/06/22 0634  •  HYDROmorphone (DILAUDID) injection 0.5 mg, 0.5 mg, Intravenous, Q4H PRN, Rigoberto Law MD, 0.5 mg at 04/07/22 0850  •  insulin lispro (ADMELOG) injection 0-7 Units, 0-7 Units,  Subcutaneous, 4x Daily With Meals & Nightly, Rigoberto Law MD, 3 Units at 04/07/22 1150  •  melatonin tablet 3 mg, 3 mg, Oral, Nightly PRN, Rigoberto Law MD  •  metoprolol tartrate (LOPRESSOR) injection 5 mg, 5 mg, Intravenous, Q6H PRN, Rigoberto Law MD, 5 mg at 04/05/22 1857  •  metoprolol tartrate (LOPRESSOR) tablet 12.5 mg, 12.5 mg, Oral, Q12H, Rigoberto Law MD, 12.5 mg at 04/07/22 0952  •  nitroglycerin (NITROSTAT) ointment 1 inch, 1 inch, Topical, Q6H, Marleny Bermudez APRN, 1 inch at 04/07/22 0951  •  ondansetron (ZOFRAN) injection 4 mg, 4 mg, Intravenous, Q6H PRN, Rigoberto Law MD, 4 mg at 04/07/22 0537  •  pantoprazole (PROTONIX) EC tablet 40 mg, 40 mg, Oral, BID AC, Rigoberto Law MD, 40 mg at 04/07/22 0952  •  Pharmacy to dose vancomycin, , Does not apply, Continuous PRN, Micheal Thompson MD  •  piperacillin-tazobactam (ZOSYN) 3.375 g in iso-osmotic dextrose 50 ml (premix), 3.375 g, Intravenous, Q8H, Micheal Thompson MD  •  prochlorperazine (COMPAZINE) injection 10 mg, 10 mg, Intravenous, Q6H PRN, Rigoberto Law MD, 10 mg at 04/07/22 0741  •  sodium bicarbonate tablet 650 mg, 650 mg, Oral, TID, Rigoberto Law MD, 650 mg at 04/07/22 0951  •  [COMPLETED] Insert peripheral IV, , , Once **AND** sodium chloride 0.9 % flush 10 mL, 10 mL, Intravenous, PRN, FultsWaldo gonzalez MD, 10 mL at 04/06/22 2009  •  sodium chloride 0.9 % infusion, 75 mL/hr, Intravenous, Continuous, Rigoberto Law MD, Last Rate: 75 mL/hr at 04/06/22 1440, 75 mL/hr at 04/06/22 1440  •  valsartan (DIOVAN) tablet 320 mg, 320 mg, Oral, Q24H, Rigoberto Law MD, 320 mg at 04/07/22 0951  •  vancomycin 1500 mg/500 mL 0.9% NS IVPB ([facility]), 20 mg/kg, Intravenous, Once, Micheal Thompson MD, 1,500 mg at 04/07/22 1344  •  vancomycin 750 mg/250 mL 0.9% NS IVPB ([facility]), 750 mg, Intravenous, Q24H, Micheal Thompson MD     ASSESSMENT  Elevated troponin consistent with non-ST elevation MI  SVT  Recurrent healthcare associated pneumonia  Acute yeast UTI  Diabetes  mellitus  Hypertension  Hyperlipidemia  Leukocytosis questionable source probably reactive  Peripheral artery disease status post right AKA  Immobilization syndrome  Gastroesophageal reflux disease    PLAN  CPM  IV antibiotics per infectious disease  Controlled heart rate  Heparin   GI and cardiology to follow patient  Continue nursing home medications  Stress ulcer DVT prophylaxis  Supportive care  DNR and poor prognosis  PT/OT  Discussed with family staff and gastroenterology  Follow closely and further recommendation according to hospital course    VANCE VILLEDA MD

## 2022-04-07 NOTE — PROGRESS NOTES
"[facility]: Vancomycin Pharmacokinetic Initial Consult Note    Gisela Gandara is a 89 y.o. female who is on day 1 of pharmacy to dose vancomycin.    Indication: PNA  Consulting Provider: Dr Thompson  Planned Duration of Therapy: 5 days  Loading Dose Ordered or Given: 1500 mg on MRSA PCR performed: was + on 3/17  Culture/Source:   4/6 blood cx pend  4/1 urine cx + yeast  Target: -600 mg/L.hr   Other Antimicrobials: zosyn, fluconazole    Vitals/Labs  Ht: 157.5 cm (62.01\"); Wt: 69.3 kg (152 lb 12.5 oz)  Temp Readings from Last 1 Encounters:   04/07/22 97.9 °F (36.6 °C) (Oral)    Estimated Creatinine Clearance: 38.8 mL/min (by C-G formula based on SCr of 0.88 mg/dL).        Results from last 7 days   Lab Units 04/07/22  0604 04/06/22  1233 04/06/22  0557 04/05/22  0642 04/05/22  0641   CREATININE mg/dL 0.88  --  0.92  --  0.83   WBC 10*3/mm3 18.77* 20.00* 20.41*   < >  --     < > = values in this interval not displayed.     Assessment/Plan:    Vancomycin Dose:   750 mg IV every  24  hours  Predictive AUC level for the dose ordered is 461 mg/L.hr, which is within the target of 400-600 mg/L.hr  Vanc Trough has been ordered for 4/9 at 2130     Pharmacy will follow patient's kidney function and will adjust doses and obtain levels as necessary. Thank you for involving pharmacy in this patient's care. Please contact pharmacy with any questions or concerns.                           Leonor Nava, PharmD  Clinical Pharmacist    "

## 2022-04-08 NOTE — PROGRESS NOTES
[facility]  Cardiology Progress Note    Patient Identification:  Name: Gisela Gandara  Age: 89 y.o.  Sex: female  :  1932  MRN: [id number]                 Follow Up / Chief Complaint: Follow-up for tachycardia and abnormal ECG    Interval History: Not a candidate for further work-up.  Blood pressure and heart rate are stable.       Subjective: Denies chest pain and shortness of breath.      Objective:    Past Medical History:  Past Medical History:   Diagnosis Date   • Arthritis    • Diabetes mellitus (HCC)    • Hyperlipidemia    • Hypertension      Past Surgical History:  No past surgical history on file.     Social History:   Social History     Tobacco Use   • Smoking status: Former Smoker   • Smokeless tobacco: Former User   Substance Use Topics   • Alcohol use: Not on file      Family History:  No family history on file.       Allergies:  No Known Allergies  Scheduled Meds:  aluminum sulfate-calcium acetate, 1 packet, Q8H  amLODIPine, 10 mg, Q24H  atorvastatin, 40 mg, Nightly  cloNIDine, 0.3 mg, Q8H  clopidogrel, 75 mg, Daily  fluconazole, 100 mg, Q24H  FLUoxetine, 20 mg, Daily  gabapentin, 100 mg, Nightly  hydrALAZINE, 100 mg, Q8H  insulin lispro, 0-7 Units, 4x Daily With Meals & Nightly  metoprolol tartrate, 12.5 mg, Q12H  nitroglycerin, 1 inch, Q6H  pantoprazole, 40 mg, BID AC  piperacillin-tazobactam, 3.375 g, Q8H  sodium bicarbonate, 1,300 mg, TID  valsartan, 320 mg, Q24H  vancomycin, 750 mg, Q24H            INTAKE AND OUTPUT:    Intake/Output Summary (Last 24 hours) at 2022 1243  Last data filed at 2022 1200  Gross per 24 hour   Intake 890 ml   Output 0 ml   Net 890 ml       Review of Systems:   GI: Denies nausea and vomiting   Cardiac: No chest pain and palpitation  Pulmonary: Denies shortness of breath and cough    Constitutional:  Temp:  [97.8 °F (36.6 °C)-98.2 °F (36.8 °C)] 98.2 °F (36.8 °C)  Heart Rate:  [80-92] 80  Resp:  [17-18] 18  BP: (127-150)/(57-84)  130/84              Physical Exam:  General:  Appears in no acute distress, resting in bed.  Chronically ill-appearing  Eyes: EOM normal no conjunctival drainage  HEENT:  No JVD. Thyroid not visibly enlarged. No mucosal pallor or cyanosis  Respiratory: Respirations regular and unlabored at rest. BBS with good air entry in all fields. No crackles, rubs or wheezes auscultated  Cardiovascular: S1S2 Regular rate and rhythm. No murmur, rub or gallop. No carotid bruits. DP/PT pulses 2+   . No pretibial pitting edema  Gastrointestinal: Abdomen soft, flat, non tender. Bowel sounds present. No hepatosplenomegaly. No ascites  Skin:   Skin warm and dry to touch. No rashes    Neuro: AAO x3 CN II-XII grossly intact  Psych: Mood and affect normal, pleasant and cooperative          I reviewed the patient's new clinical results, and personally reviewed and interpreted the patient's ECG and telemetry data from the last 24 hours        Cardiographics            Comparison 3/22/22       Telemetry:        Echocardiogram:   Interpretation Summary     · Estimated right ventricular systolic pressure from tricuspid regurgitation is mildly elevated (35-45 mmHg).  · Mild aortic valve stenosis is present.  · Calculated left ventricular EF = 78.5% Estimated left ventricular EF was in agreement with the calculated left ventricular EF.  · Left ventricular diastolic function is consistent with (grade I) impaired relaxation.  · The right atrial cavity is borderline dilated.  · There is no evidence of pericardial effusion. .        Imaging        CONCLUSION: Negative esophagram. 103 images were obtained and the  fluoroscopy time measures 32 seconds.     This report was finalized on 4/2/2022 4:14 PM by Dr. Ran Villalta M.D.         Lab Review   Results from last 7 days   Lab Units 04/07/22  0604 04/06/22  1233 04/06/22  0557   TROPONIN T ng/mL 0.253* 0.470* 0.335*         Results from last 7 days   Lab Units 04/08/22  0524   SODIUM mmol/L 140    POTASSIUM mmol/L 3.8   BUN mg/dL 23   CREATININE mg/dL 1.00   CALCIUM mg/dL 7.7*        Results from last 7 days   Lab Units 04/08/22  0524 04/07/22  0604 04/06/22  1233   WBC 10*3/mm3 26.80* 18.77* 20.00*   HEMOGLOBIN g/dL 10.6* 11.2* 11.6*   HEMATOCRIT % 33.2* 35.2 35.9   PLATELETS 10*3/mm3 438 437 469*     Results from last 7 days   Lab Units 04/08/22  1124 04/08/22  0524 04/07/22  2321 04/06/22  2257 04/06/22  1102   INR   --   --   --   --  1.29*   APTT seconds 116.9* >200.0* >200.0*   < > 30.3    < > = values in this interval not displayed.     The following medical decision was discussed in detail with Dr. Martinez      Assessment:    Oropharyngeal dysphagia  Acute UTI  Diabetes mellitus  Hypertension: Stable  Hyperlipidemia  Elevated troponin  ACS: QRS and T wave morphology similar to previous EKG on 3/22/2022.  Nitropaste resolved back discomfort.      Blood pressure and heart rate are stable.  Liver enzymes slightly elevated.  May need to decrease statin.  Most recent echo revealed EF 78%, mild AV stenosis and grade 1 LV function.  She is not a candidate for any further work-up.  Discussed with Dr. Martinez and she is cleared with higher than normal risk for any procedure.  If she is a candidate for cardiac rehab then cardiac rehab can be addressed in the outpatient setting.  She will be as needed over the weekend.  Please call with any concerns.       Gisela Gandara has been seen and examined with no clinical signs of angina or CHF patient is cleared for   surgery with non-modifiable risk factors. Patient has been advised to take cardiac meds with sip of water on the day of surgery.    Please use beta blocker for tachycardia preoperatively. Anticoagulation  To be managed appropriately.      Watch for chest pain, shortness of breath, palpitations, arrhythmias, and significant change in the blood pressure preoperatively. Please check EKG pre-op and postop if any questions notify us if any change  "in patient's cardiovascular conditions.        abs/tests ordered for am: Discontinue heparin, and Nitropaste.  Add Imdur with parameters.    )4/8/2022  ROHIT Allen      EMR Dragon/Transcription:   \"Dictated utilizing Dragon dictation\".     "

## 2022-04-08 NOTE — PROGRESS NOTES
Arterial blood gas was obtained during rapid response.  Values were read as CNC or could not calculate indicating values out of the normal limits the machine can register.  I asked if a repeat gas was warranted but the patient was actively passing with BP extremely low and agonal breathing.  Rapid team agreed that it was not necessary.  Joseph Shepherd RRT

## 2022-04-08 NOTE — NURSING NOTE
"Patient Name:  Gisela Gandara  YOB: 1932  MRN:  [id number]  Admit Date:  4/1/2022    Visit Diagnoses:     ICD-10-CM ICD-9-CM   1. Acute UTI  N39.0 599.0   2. Dysphagia, unspecified type  R13.10 787.20   3. Generalized weakness  R53.1 780.79       Reason For Rapid: Decreased level of consciousness, hypoglycemia, hypotension    RN Communicated With: Susan GALINDO RN, Destiny BROCK RN, Mary WORTHINGTON, Diann KEVIN RN, Dr. Law.       Rapid Outcome: Remain on Unit    Communication From Rapid Team:     Upon arrival to the rapid response patient was hypoglycemic with a decreased level of consciousness. This was treated per protocol and blood glucose returned to within normal limits. Pt was also hypotensive. A fluid bolus was administered with no improvement in blood pressure. At this time Dr. Law was contacted and updated on patient status. No new orders received. Patient's daughter (Pam) was contacted and updated on patient status. DNR/DNI status confirmed. She stated that she would inform her siblings and that she was on her way to the hospital now. Patient's blood pressure and respiratory drive continued to decline and patient daughter Pam was contacted again and informed. She states that she is very close by. Arterial Blood Gas obtained by Joseph BEVERLY RRT. Values were read as \"could not calculate\". Repeat testing was not attempted given patients status. Mary COTTRELL RN  remains at bedside with patient until daughter arrives.       Most Recent Vital Signs  Temp:  [97.8 °F (36.6 °C)-98.2 °F (36.8 °C)] 98.2 °F (36.8 °C)  Heart Rate:  [58-92] 58  Resp:  [17-18] 18  BP: (130-150)/(65-84) 130/84  SpO2:  [94 %-99 %] 95 %  on   ;   Device (Oxygen Therapy): room air    Labs:      Glucose   Date/Time Value Ref Range Status   04/08/2022 1559 239 (H) 70 - 130 mg/dL Final     Comment:     Meter: [id number] : [id number] Jesse Billingsley RN   04/08/2022 1554 384 (H) 70 - 130 mg/dL Final     Comment:     Meter: " [id number] : [id number] Jesse Billingsley RN   04/08/2022 1549 37 (C) 70 - 130 mg/dL Final     Comment:     Treated Patient RN Notified R and V Repeat Test Meter: [id number] : [id number]   04/08/2022 1052 88 70 - 130 mg/dL Final     Comment:     Meter: [id number] : [id number] Lety Dejesus NA   04/08/2022 0625 106 70 - 130 mg/dL Final     Comment:     Meter: [id number] : [id number] Maribel Arias RN   04/07/2022 2218 187 (H) 70 - 130 mg/dL Final     Comment:     Meter: [id number] : [id number] Varun Viera NA   04/07/2022 1629 160 (H) 70 - 130 mg/dL Final     Comment:     Meter: [id number] : [id number] Oz COBB     No results found for: SITE, ALLENTEST, PHART, APU3OXT, PO2ART, DJD9UVB, BASEEXCESS, J2KZEWVG, HGBBG, HCTABG, OXYHEMOGLOBI, METHHGBN, CARBOXYHGB, CO2CT, BAROMETRIC, MODALITY, FIO2  Results from last 7 days   Lab Units 04/08/22  0524 04/07/22  0604 04/06/22  1233 04/06/22  0557   WBC 10*3/mm3 26.80* 18.77* 20.00* 20.41*   HEMOGLOBIN g/dL 10.6* 11.2* 11.6* 11.2*   PLATELETS 10*3/mm3 438 437 469* 443     Results from last 7 days   Lab Units 04/08/22  0524 04/07/22  0604 04/06/22  0557   SODIUM mmol/L 140 140 137   POTASSIUM mmol/L 3.8 4.4 5.0   CHLORIDE mmol/L 110* 105 104   CO2 mmol/L 16.0* 14.3* 17.2*   BUN mg/dL 23 18 12   CREATININE mg/dL 1.00 0.88 0.92   GLUCOSE mg/dL 110* 162* 256*   ALBUMIN g/dL 2.20* 2.20*  --    BILIRUBIN mg/dL 0.4 0.3  --    ALK PHOS U/L 206* 240*  --    AST (SGOT) U/L 48* 57*  --    ALT (SGPT) U/L 35* 39*  --    Estimated Creatinine Clearance: 34.1 mL/min (by C-G formula based on SCr of 1 mg/dL).  Results from last 7 days   Lab Units 04/07/22  0604 04/06/22  1233 04/06/22  0557 04/05/22 2009   TROPONIN T ng/mL 0.253* 0.470* 0.335* 0.057*         No results found for: STREPPNEUAG, LEGANTIGENUR  Results from last 7 days   Lab Units 04/06/22  1637   BLOODCX  No growth at 24 hours                                                                Please refer  to full rapid documentation on summary page under Index / Code Timeline

## 2022-04-08 NOTE — NURSING NOTE
Confirmed time of death at this time. 2 family members at bedside. Mary Gonzalez RN at bedside to confirm time of death. All doctors notified.

## 2022-04-08 NOTE — PROGRESS NOTES
"  Infectious Diseases Progress Note    Micheal Thompson MD     [facility]  [place]: 2 days  Patient Identification:  Name: Gisela Gandara  Age: 89 y.o.  Sex: female  :  1932  MRN: [id number]         Primary Care Physician: Rigoberto Law MD            Subjective: Continues to feel poorly.  Interval History: See consultation note.    Objective:    Scheduled Meds:aluminum sulfate-calcium acetate, 1 packet, Topical, Q8H  amLODIPine, 10 mg, Oral, Q24H  atorvastatin, 40 mg, Oral, Nightly  cloNIDine, 0.3 mg, Oral, Q8H  clopidogrel, 75 mg, Oral, Daily  fluconazole, 100 mg, Oral, Q24H  FLUoxetine, 20 mg, Oral, Daily  gabapentin, 100 mg, Oral, Nightly  hydrALAZINE, 100 mg, Oral, Q8H  insulin lispro, 0-7 Units, Subcutaneous, 4x Daily With Meals & Nightly  metoprolol tartrate, 12.5 mg, Oral, Q12H  nitroglycerin, 1 inch, Topical, Q6H  pantoprazole, 40 mg, Oral, BID AC  piperacillin-tazobactam, 3.375 g, Intravenous, Q8H  sodium bicarbonate, 1,300 mg, Oral, TID  valsartan, 320 mg, Oral, Q24H  vancomycin, 750 mg, Intravenous, Q24H      Continuous Infusions:heparin, 12 Units/kg/hr, Last Rate: Stopped (22 0651)  Pharmacy to dose vancomycin,   sodium chloride, 75 mL/hr, Last Rate: 75 mL/hr (22 1440)        Vital signs in last 24 hours:  Temp:  [97.8 °F (36.6 °C)-98.2 °F (36.8 °C)] 98.2 °F (36.8 °C)  Heart Rate:  [80-92] 80  Resp:  [17-18] 18  BP: (127-150)/(57-84) 130/84    Intake/Output:    Intake/Output Summary (Last 24 hours) at 2022 1250  Last data filed at 2022 1200  Gross per 24 hour   Intake 890 ml   Output 0 ml   Net 890 ml       Exam:  /84 (BP Location: Left arm, Patient Position: Sitting)   Pulse 80   Temp 98.2 °F (36.8 °C) (Oral)   Resp 18   Ht 157.5 cm (62.01\")   Wt 69.3 kg (152 lb 12.5 oz)   SpO2 95%   BMI 27.94 kg/m²   Patient is examined using the personal protective equipment as per guidelines from infection control for this particular patient as enacted.  Hand washing " was performed before and after patient interaction.  General Appearance:  Ill-appearing female                          Head:    Normocephalic, without obvious abnormality, atraumatic                           Eyes:    PERRL, conjunctivae/corneas clear, EOM's intact, both eyes                         Throat:   Lips, tongue, gums normal; oral mucosa pink and moist                           Neck:   Supple, symmetrical, trachea midline, no JVD                         Lungs:    Clear to auscultation bilaterally, respirations unlabored                 Chest Wall:    No tenderness or deformity                          Heart:    Regular rate and rhythm, S1 and S2 normal, no murmur, no rub                                         or gallop                  Abdomen:   Mild generalized tenderness                 Extremities: Right AKA                        Pulses:   Pulses palpable in all extremities                            Skin:   Skin is warm and dry,  no rashes or palpable lesions                  Neurologic: Grossly nonfocal       Data Review:    I reviewed the patient's new clinical results.  Results from last 7 days   Lab Units 04/08/22  0524 04/07/22  0604 04/06/22  1233 04/06/22  0557 04/05/22  0642 04/04/22  0821 04/03/22  0722   WBC 10*3/mm3 26.80* 18.77* 20.00* 20.41* 13.22* 9.67 10.96*   HEMOGLOBIN g/dL 10.6* 11.2* 11.6* 11.2* 10.9* 9.8* 10.0*   PLATELETS 10*3/mm3 438 437 469* 443 461* 404 415     Results from last 7 days   Lab Units 04/08/22  0524 04/07/22  0604 04/06/22  0557 04/05/22  0641 04/04/22  0821 04/03/22  0722 04/02/22  0559   SODIUM mmol/L 140 140 137 136 135* 136 138   POTASSIUM mmol/L 3.8 4.4 5.0 5.2 5.1 4.6 3.8   CHLORIDE mmol/L 110* 105 104 106 106 107 108*   CO2 mmol/L 16.0* 14.3* 17.2* 16.2* 19.1* 19.0* 20.0*   BUN mg/dL 23 18 12 11 14 12 10   CREATININE mg/dL 1.00 0.88 0.92 0.83 1.04* 1.06* 0.78   CALCIUM mg/dL 7.7* 8.1* 8.6 8.4 7.8* 7.9* 7.8*   GLUCOSE mg/dL 110* 162* 256* 193* 147* 155*  163*     Microbiology Results (last 10 days)     Procedure Component Value - Date/Time    Blood Culture - Blood, Arm, Left [[id number]]  (Normal) Collected: 04/06/22 1637    Lab Status: Preliminary result Specimen: Blood from Arm, Left Updated: 04/07/22 1703     Blood Culture No growth at 24 hours    Blood Culture - Blood, Arm, Right [[id number]]  (Normal) Collected: 04/06/22 1633    Lab Status: Preliminary result Specimen: Blood from Arm, Right Updated: 04/07/22 1703     Blood Culture No growth at 24 hours    CANDIDA AURIS SCREEN - Swab, Axilla Right, Axilla Left and Groin [[id number]]  (Normal) Collected: 04/03/22 1407    Lab Status: Preliminary result Specimen: Swab from Axilla Right, Axilla Left and Groin Updated: 04/07/22 1433     Candida Auris Screen Culture No Candida auris isolated at 4 days    COVID PRE-OP / PRE-PROCEDURE SCREENING ORDER (NO ISOLATION) - Swab, Nasopharynx [[id number]]  (Normal) Collected: 04/01/22 1618    Lab Status: Final result Specimen: Swab from Nasopharynx Updated: 04/01/22 1704    Narrative:      The following orders were created for panel order COVID PRE-OP / PRE-PROCEDURE SCREENING ORDER (NO ISOLATION) - Swab, Nasopharynx.  Procedure                               Abnormality         Status                     ---------                               -----------         ------                     COVID-19,[facility]...[[id number]]  Normal              Final result                 Please view results for these tests on the individual orders.    COVID-19,[facility] CEPHEID/ELBERT NP SWAB IN TRANSPORT MEDIA 8-12 HR TAT - Swab, Nasopharynx [[id number]]  (Normal) Collected: 04/01/22 1618    Lab Status: Final result Specimen: Swab from Nasopharynx Updated: 04/01/22 1704     COVID19 Not Detected    Narrative:      Fact sheet for providers: https://www.fda.gov/media/774055/download    Fact sheet for patients: https://www.fda.gov/media/937637/download    Test performed by PCR.    Urine  Culture - Urine, Urine, Catheter [[id number]]  (Abnormal) Collected: 04/01/22 1526    Lab Status: Final result Specimen: Urine, Catheter Updated: 04/02/22 1515     Urine Culture Yeast isolated    Narrative:      No additional tests pending.            Assessment:    Acute UTI    NSTEMI (non-ST elevated myocardial infarction) ([facility])    A-this presentation of progressive leukocytosis after initial improvement in the setting of episode of choking that occurred earlier and now rising troponin level and ongoing back pain and associated sense of ill health despite appropriate treatment of identified infectious processes concerning for:  1-recurrent healthcare associated pneumonia  2-reactive leukocytosis due to acute coronary syndrome  3-possibility of infected lumbar spine with discitis and osteomyelitis  4-evolving C. difficile infection given the fact the patient has had multiple courses of antibiotics in the recent few weeks.  5-monitor closely for possibility of obstructive  process.  B-possible acute non-ST segment elevation MI  c-peripheral vascular disease with history of right AKA  D-other diagnosis per primary team        Recommendations/Discussions:   Continue present care  Follow-up on culture results  Management of underlying coronary artery disease and non-ST segment elevation MI per primary team.  Micheal Thompson MD  4/8/2022  12:50 EDT    Much of this encounter note is an electronic transcription/translation of spoken language to printed text. The electronic translation of spoken language may permit erroneous, or at times, nonsensical words or phrases to be inadvertently transcribed; Although I have reviewed the note for such errors, some may still exist

## 2022-04-08 NOTE — PROGRESS NOTES
"Daily progress note    Chief complaint  Not doing too well  Awake and alert  Denies nausea vomiting  Denies chest pain shortness of breath  Following all commands    History of present illness  89-year-old -American female who is well-known to our service with history of peripheral artery disease status post right AKA who also have diabetes mellitus hypertension hyperlipidemia gastroesophageal reflux disease who has been immobilized and was recently treated for UTI pneumonia and discharged back to nursing home in stable condition sent to the ER that she has had a choking episode and unable to eat with generalized weakness.  Patient evaluated in ER admit for further work-up.  Patient fully alert oriented denies any fever cough chest pain increase shortness of breath abdominal pain nausea vomiting diarrhea.     REVIEW OF SYSTEMS  Unremarkable except generalized weakness     PHYSICAL EXAM  Blood pressure 130/84, pulse 58, temperature 98.2 °F (36.8 °C), temperature source Oral, resp. rate 18, height 157.5 cm (62.01\"), weight 69.3 kg (152 lb 12.5 oz), SpO2 95 %.    GENERAL: Awake and alert, no acute distress  HENT: nares patent, oropharynx clear, dentures present  EYES: no scleral icterus, EOMI  CV: regular rhythm, normal rate, no murmur appreciated  RESPIRATORY: normal effort, lungs clear auscultation bilaterally  ABDOMEN: soft, nondistended, nontender throughout  MUSCULOSKELETAL: Status post right AKA, left lower extremity is normal to inspection  NEURO: alert, moves all extremities, follows commands  PSYCH:  calm, cooperative  SKIN: warm, dry     LAB RESULTS   Lab Results (last 24 hours)     Procedure Component Value Units Date/Time    POC Glucose Once [[id number]]  (Abnormal) Collected: 04/08/22 1559    Specimen: Blood Updated: 04/08/22 1611     Glucose 239 mg/dL      Comment: Meter: [id number] : [id number] Jesse Billingsley RN       POC Glucose Once [[id number]]  (Abnormal) Collected: 04/08/22 1554    " Specimen: Blood Updated: 04/08/22 1611     Glucose 384 mg/dL      Comment: Meter: [id number] : [id number] Jesse Billingsley RN       POC Glucose Once [[id number]]  (Abnormal) Collected: 04/08/22 1549    Specimen: Blood Updated: 04/08/22 1611     Glucose 37 mg/dL      Comment: Treated Patient RN Notified R and V Repeat Test Meter: [id number] : [id number]       MARLENY AURIS SCREEN - Swab, Axilla Right, Axilla Left and Groin [[id number]]  (Normal) Collected: 04/03/22 1407    Specimen: Swab from Axilla Right, Axilla Left and Groin Updated: 04/08/22 1433     Candida Auris Screen Culture No Candida auris isolated at 5 days    aPTT [[id number]]  (Abnormal) Collected: 04/08/22 1124    Specimen: Blood from Arm, Right Updated: 04/08/22 1221     .9 seconds     POC Glucose Once [[id number]]  (Normal) Collected: 04/08/22 1052    Specimen: Blood Updated: 04/08/22 1055     Glucose 88 mg/dL      Comment: Meter: [id number] : [id number] Lety CA       Manual Differential [[id number]]  (Abnormal) Collected: 04/08/22 0524    Specimen: Blood Updated: 04/08/22 0641     Neutrophil % 74.7 %      Lymphocyte % 11.1 %      Monocyte % 11.1 %      Metamyelocyte % 2.0 %      Myelocyte % 1.0 %      Neutrophils Absolute 20.02 10*3/mm3      Lymphocytes Absolute 2.97 10*3/mm3      Monocytes Absolute 2.97 10*3/mm3      Polychromasia Mod/2+     WBC Morphology Normal     Platelet Morphology Normal    aPTT [[id number]]  (Abnormal) Collected: 04/08/22 0524    Specimen: Blood from Hand, Right Updated: 04/08/22 0637     PTT >200.0 seconds     POC Glucose Once [[id number]]  (Normal) Collected: 04/08/22 0625    Specimen: Blood Updated: 04/08/22 0626     Glucose 106 mg/dL      Comment: Meter: [id number] : [id number] Maribel Arias RN       Comprehensive Metabolic Panel [[id number]]  (Abnormal) Collected: 04/08/22 0524    Specimen: Blood Updated: 04/08/22 0616     Glucose 110 mg/dL      BUN 23 mg/dL      Creatinine 1.00 mg/dL      Sodium 140  mmol/L      Potassium 3.8 mmol/L      Chloride 110 mmol/L      CO2 16.0 mmol/L      Calcium 7.7 mg/dL      Total Protein 5.5 g/dL      Albumin 2.20 g/dL      ALT (SGPT) 35 U/L      AST (SGOT) 48 U/L      Alkaline Phosphatase 206 U/L      Total Bilirubin 0.4 mg/dL      Globulin 3.3 gm/dL      A/G Ratio 0.7 g/dL      BUN/Creatinine Ratio 23.0     Anion Gap 14.0 mmol/L      eGFR 53.6 mL/min/1.73      Comment: National Kidney Foundation and American Society of Nephrology (ASN) Task Force recommended calculation based on the Chronic Kidney Disease Epidemiology Collaboration (CKD-EPI) equation refit without adjustment for race.       Narrative:      GFR Normal >60  Chronic Kidney Disease <60  Kidney Failure <15      CBC & Differential [[id number]]  (Abnormal) Collected: 04/08/22 0524    Specimen: Blood Updated: 04/08/22 0550    Narrative:      The following orders were created for panel order CBC & Differential.  Procedure                               Abnormality         Status                     ---------                               -----------         ------                     CBC Auto Differential[[id number]]        Abnormal            Final result                 Please view results for these tests on the individual orders.    CBC Auto Differential [[id number]]  (Abnormal) Collected: 04/08/22 0524    Specimen: Blood Updated: 04/08/22 0550     WBC 26.80 10*3/mm3      RBC 3.68 10*6/mm3      Hemoglobin 10.6 g/dL      Hematocrit 33.2 %      MCV 90.2 fL      MCH 28.8 pg      MCHC 31.9 g/dL      RDW 16.7 %      RDW-SD 55.4 fl      MPV 10.1 fL      Platelets 438 10*3/mm3      nRBC 1.0 /100 WBC     aPTT [[id number]]  (Abnormal) Collected: 04/07/22 2321    Specimen: Blood from Arm, Right Updated: 04/08/22 0014     PTT >200.0 seconds     POC Glucose Once [[id number]]  (Abnormal) Collected: 04/07/22 2218    Specimen: Blood Updated: 04/07/22 2219     Glucose 187 mg/dL      Comment: Meter: [id number] : [id number] Bond  Aylin LANNY       Blood Culture - Blood, Arm, Right [[id number]]  (Normal) Collected: 04/06/22 1633    Specimen: Blood from Arm, Right Updated: 04/07/22 1703     Blood Culture No growth at 24 hours    Blood Culture - Blood, Arm, Left [[id number]]  (Normal) Collected: 04/06/22 1637    Specimen: Blood from Arm, Left Updated: 04/07/22 1703     Blood Culture No growth at 24 hours    POC Glucose Once [[id number]]  (Abnormal) Collected: 04/07/22 1629    Specimen: Blood Updated: 04/07/22 1647     Glucose 160 mg/dL      Comment: Meter: [id number] : [id number] Oz Barrett JORDYN           Imaging Results (Last 24 Hours)     ** No results found for the last 24 hours. **          Current Facility-Administered Medications:   •  acetaminophen (TYLENOL) tablet 650 mg, 650 mg, Oral, Q6H PRN, Rigoberto Law MD, 650 mg at 04/07/22 1014  •  aluminum sulfate-calcium acetate (DOMEBORO) topical packet 1 packet, 1 packet, Topical, Q8H, Rigoberto Law MD, 1 packet at 04/08/22 1456  •  amLODIPine (NORVASC) tablet 10 mg, 10 mg, Oral, Q24H, Rigoberto Law MD, 10 mg at 04/08/22 0915  •  atorvastatin (LIPITOR) tablet 40 mg, 40 mg, Oral, Nightly, Marleny Bermudez APRN, 40 mg at 04/07/22 2048  •  cloNIDine (CATAPRES) tablet 0.3 mg, 0.3 mg, Oral, Q8H, Rigoberto Law MD, 0.3 mg at 04/08/22 0916  •  clopidogrel (PLAVIX) tablet 75 mg, 75 mg, Oral, Daily, Marleny Bermudez APRN, 75 mg at 04/08/22 0916  •  dextrose (D50W) (25 g/50 mL) IV injection 50 mL, 50 mL, Intravenous, Q1H PRN, Rigoberto Law MD, 50 mL at 04/08/22 1553  •  fluconazole (DIFLUCAN) tablet 100 mg, 100 mg, Oral, Q24H, Micheal Thompson MD, 100 mg at 04/07/22 1548  •  FLUoxetine (PROzac) capsule 20 mg, 20 mg, Oral, Daily, Rigoberto Law MD, 20 mg at 04/08/22 0916  •  gabapentin (NEURONTIN) capsule 100 mg, 100 mg, Oral, Nightly, Rigoberto Law MD, 100 mg at 04/07/22 2048  •  heparin (porcine) 5000 UNIT/ML injection 2,100-4,000 Units, 30-57.7 Units/kg, Intravenous, Q6H PRN, Marleny Bermudez,  APRN, 4,000 Units at 04/07/22 1029  •  hydrALAZINE (APRESOLINE) tablet 100 mg, 100 mg, Oral, Q8H, Rigoberto Law MD, 100 mg at 04/08/22 0916  •  HYDROcodone-acetaminophen (NORCO) 5-325 MG per tablet 1 tablet, 1 tablet, Oral, Q6H PRN, Rigoberto Law MD, 1 tablet at 04/07/22 1620  •  hydrocortisone-bacitracin-zinc oxide-nystatin (MAGIC BARRIER) ointment 1 application, 1 application, Topical, PRN, Rigoberto Law MD, 1 application at 04/06/22 0634  •  HYDROmorphone (DILAUDID) injection 0.5 mg, 0.5 mg, Intravenous, Q4H PRN, Rigoberto Law MD, 0.5 mg at 04/08/22 1115  •  insulin lispro (ADMELOG) injection 0-7 Units, 0-7 Units, Subcutaneous, 4x Daily With Meals & Nightly, Rigoberto Law MD, 2 Units at 04/07/22 2247  •  [START ON 4/9/2022] isosorbide mononitrate (IMDUR) 24 hr tablet 30 mg, 30 mg, Oral, Q24H, Yennifer Brandt APRN  •  melatonin tablet 3 mg, 3 mg, Oral, Nightly PRN, Rigoberto Law MD  •  metoprolol tartrate (LOPRESSOR) injection 5 mg, 5 mg, Intravenous, Q6H PRN, Rigoberto Law MD, 5 mg at 04/05/22 1857  •  metoprolol tartrate (LOPRESSOR) tablet 12.5 mg, 12.5 mg, Oral, Q12H, Rigoberto Law MD, 12.5 mg at 04/08/22 0914  •  naloxone (NARCAN) injection 0.2 mg, 0.2 mg, Intravenous, PRN, Rigoberto Law MD, 0.2 mg at 04/08/22 1605  •  ondansetron (ZOFRAN) injection 4 mg, 4 mg, Intravenous, Q6H PRN, Rigoberto Law MD, 4 mg at 04/08/22 1115  •  pantoprazole (PROTONIX) EC tablet 40 mg, 40 mg, Oral, BID AC, Rigoberto Law MD, 40 mg at 04/08/22 0702  •  Pharmacy to dose vancomycin, , Does not apply, Continuous PRN, Micheal Thompson MD  •  piperacillin-tazobactam (ZOSYN) 3.375 g in iso-osmotic dextrose 50 ml (premix), 3.375 g, Intravenous, Q8H, Micheal Thompson MD, 3.375 g at 04/08/22 0914  •  prochlorperazine (COMPAZINE) injection 10 mg, 10 mg, Intravenous, Q6H PRN, Rigoberto Law MD, 10 mg at 04/07/22 0741  •  sodium bicarbonate tablet 1,300 mg, 1,300 mg, Oral, TID, Rigoberto Law MD, 1,300 mg at 04/08/22 0916  •  [COMPLETED] Insert  peripheral IV, , , Once **AND** sodium chloride 0.9 % flush 10 mL, 10 mL, Intravenous, PRN, Waldo Rodrigez MD, 10 mL at 04/06/22 2009  •  sodium chloride 0.9 % infusion, 75 mL/hr, Intravenous, Continuous, Vance Law MD, Last Rate: 75 mL/hr at 04/06/22 1440, 75 mL/hr at 04/06/22 1440  •  valsartan (DIOVAN) tablet 320 mg, 320 mg, Oral, Q24H, Vance Law MD, 320 mg at 04/08/22 0916  •  vancomycin 750 mg/250 mL 0.9% NS IVPB ([facility]), 750 mg, Intravenous, Q24H, Micheal Thompson MD, 750 mg at 04/07/22 2149     ASSESSMENT  Elevated troponin consistent with non-ST elevation MI  SVT  Recurrent healthcare associated pneumonia  Acute yeast UTI  Diabetes mellitus  Hypertension  Hyperlipidemia  Leukocytosis questionable source probably reactive  Peripheral artery disease status post right AKA  Immobilization syndrome  Gastroesophageal reflux disease    PLAN  CPM  IV antibiotics per infectious disease  Controlled heart rate  Heparin   GI and cardiology to follow patient  Continue nursing home medications  Stress ulcer DVT prophylaxis  Supportive care  DNR and poor prognosis  PT/OT  Discussed with family staff and gastroenterology  Follow closely and further recommendation according to hospital course    VANCE LAW MD

## 2022-04-08 NOTE — PROGRESS NOTES
Gastroenterology   Inpatient Progress Note    Reason for Follow Up:  Choking, unable to eat    Subjective  Interval History:   Very difficult to obtain history from patient as she is difficult to understand.  She is able to shake her head yes and no to questions.    Current Facility-Administered Medications:   •  acetaminophen (TYLENOL) tablet 650 mg, 650 mg, Oral, Q6H PRN, Rigoberto Law MD, 650 mg at 04/07/22 1014  •  aluminum sulfate-calcium acetate (DOMEBORO) topical packet 1 packet, 1 packet, Topical, Q8H, Rigoberto Law MD, 1 packet at 04/08/22 1456  •  amLODIPine (NORVASC) tablet 10 mg, 10 mg, Oral, Q24H, Rigoberto Law MD, 10 mg at 04/08/22 0915  •  atorvastatin (LIPITOR) tablet 40 mg, 40 mg, Oral, Nightly, Marleny Bermudez APRN, 40 mg at 04/07/22 2048  •  cloNIDine (CATAPRES) tablet 0.3 mg, 0.3 mg, Oral, Q8H, Rigoberto Law MD, 0.3 mg at 04/08/22 0916  •  clopidogrel (PLAVIX) tablet 75 mg, 75 mg, Oral, Daily, Marleny Bermudez APRN, 75 mg at 04/08/22 0916  •  fluconazole (DIFLUCAN) tablet 100 mg, 100 mg, Oral, Q24H, Micheal Thompson MD, 100 mg at 04/07/22 1548  •  FLUoxetine (PROzac) capsule 20 mg, 20 mg, Oral, Daily, Rigoberto Law MD, 20 mg at 04/08/22 0916  •  gabapentin (NEURONTIN) capsule 100 mg, 100 mg, Oral, Nightly, Rigoberto Law MD, 100 mg at 04/07/22 2048  •  heparin (porcine) 5000 UNIT/ML injection 2,100-4,000 Units, 30-57.7 Units/kg, Intravenous, Q6H PRN, Marleny Bermudez APRN, 4,000 Units at 04/07/22 1029  •  hydrALAZINE (APRESOLINE) tablet 100 mg, 100 mg, Oral, Q8H, Rigoberto Law MD, 100 mg at 04/08/22 0916  •  HYDROcodone-acetaminophen (NORCO) 5-325 MG per tablet 1 tablet, 1 tablet, Oral, Q6H PRN, Rigoberto Law MD, 1 tablet at 04/07/22 1620  •  hydrocortisone-bacitracin-zinc oxide-nystatin (MAGIC BARRIER) ointment 1 application, 1 application, Topical, PRN, Rigoberto Law MD, 1 application at 04/06/22 0634  •  HYDROmorphone (DILAUDID) injection 0.5 mg, 0.5 mg, Intravenous, Q4H PRN, Rigoberto Law MD,  0.5 mg at 04/08/22 1115  •  insulin lispro (ADMELOG) injection 0-7 Units, 0-7 Units, Subcutaneous, 4x Daily With Meals & Nightly, Rigoberto Law MD, 2 Units at 04/07/22 2247  •  [START ON 4/9/2022] isosorbide mononitrate (IMDUR) 24 hr tablet 30 mg, 30 mg, Oral, Q24H, Yennifer Brandt APRN  •  melatonin tablet 3 mg, 3 mg, Oral, Nightly PRN, Rigoberto Law MD  •  metoprolol tartrate (LOPRESSOR) injection 5 mg, 5 mg, Intravenous, Q6H PRN, Rigoberto Law MD, 5 mg at 04/05/22 1857  •  metoprolol tartrate (LOPRESSOR) tablet 12.5 mg, 12.5 mg, Oral, Q12H, Rigoberto Law MD, 12.5 mg at 04/08/22 0914  •  ondansetron (ZOFRAN) injection 4 mg, 4 mg, Intravenous, Q6H PRN, Rigoberto Law MD, 4 mg at 04/08/22 1115  •  pantoprazole (PROTONIX) EC tablet 40 mg, 40 mg, Oral, BID AC, Rigoberto Law MD, 40 mg at 04/08/22 0702  •  Pharmacy to dose vancomycin, , Does not apply, Continuous PRN, Micheal Thompson MD  •  piperacillin-tazobactam (ZOSYN) 3.375 g in iso-osmotic dextrose 50 ml (premix), 3.375 g, Intravenous, Q8H, Micheal Thompson MD, 3.375 g at 04/08/22 0914  •  prochlorperazine (COMPAZINE) injection 10 mg, 10 mg, Intravenous, Q6H PRN, Rigoberto Law MD, 10 mg at 04/07/22 0741  •  sodium bicarbonate tablet 1,300 mg, 1,300 mg, Oral, TID, Rigoberto Law MD, 1,300 mg at 04/08/22 0916  •  [COMPLETED] Insert peripheral IV, , , Once **AND** sodium chloride 0.9 % flush 10 mL, 10 mL, Intravenous, PRN, Waldo Rodrgiez MD, 10 mL at 04/06/22 2009  •  sodium chloride 0.9 % infusion, 75 mL/hr, Intravenous, Continuous, Rigoberto Law MD, Last Rate: 75 mL/hr at 04/06/22 1440, 75 mL/hr at 04/06/22 1440  •  valsartan (DIOVAN) tablet 320 mg, 320 mg, Oral, Q24H, Rigoberto Law MD, 320 mg at 04/08/22 0916  •  vancomycin 750 mg/250 mL 0.9% NS IVPB ([facility]), 750 mg, Intravenous, Q24H, Micheal Thompson MD, 750 mg at 04/07/22 1143  Review of Systems:               Review of systems could not be obtained due to  patient difficult to understand in regards to  speech    Objective     Vital Signs  Temp:  [97.8 °F (36.6 °C)-98.2 °F (36.8 °C)] 98.2 °F (36.8 °C)  Heart Rate:  [80-92] 80  Resp:  [17-18] 18  BP: (127-150)/(57-84) 130/84  Body mass index is 27.94 kg/m².                  Physical Exam:              General: patient awake, alert and cooperative              Eyes: no scleral icterus              Skin: warm and dry, not jaundiced              Abdomen: soft, nontender, nondistended; normal bowel sounds              Psychiatric: Appropriate affect and behavior                Results Review:                I reviewed the patient's new clinical results.  I reviewed the patient's new imaging results and agree with the interpretation.  I reviewed the patient's other test results and agree with the interpretation    Results from last 7 days   Lab Units 04/08/22  0524 04/07/22  0604 04/06/22  1233   WBC 10*3/mm3 26.80* 18.77* 20.00*   HEMOGLOBIN g/dL 10.6* 11.2* 11.6*   HEMATOCRIT % 33.2* 35.2 35.9   PLATELETS 10*3/mm3 438 437 469*     Results from last 7 days   Lab Units 04/08/22  0524 04/07/22  0604 04/06/22  0557 04/02/22  0559 04/01/22  1503   SODIUM mmol/L 140 140 137   < > 138   POTASSIUM mmol/L 3.8 4.4 5.0   < > 4.1   CHLORIDE mmol/L 110* 105 104   < > 105   CO2 mmol/L 16.0* 14.3* 17.2*   < > 17.0*   BUN mg/dL 23 18 12   < > 11   CREATININE mg/dL 1.00 0.88 0.92   < > 0.93   CALCIUM mg/dL 7.7* 8.1* 8.6   < > 8.4   BILIRUBIN mg/dL 0.4 0.3  --   --  0.4   ALK PHOS U/L 206* 240*  --   --  218*   ALT (SGPT) U/L 35* 39*  --   --  30   AST (SGOT) U/L 48* 57*  --   --  28   GLUCOSE mg/dL 110* 162* 256*   < > 214*    < > = values in this interval not displayed.     Results from last 7 days   Lab Units 04/06/22  1102   INR  1.29*     Lab Results   Lab Value Date/Time    LIPASE 82 (H) 04/07/2022 0604    LIPASE 54 04/01/2022 1503    LIPASE 36 03/16/2022 1536       Radiology:  CT Chest Without Contrast Diagnostic   Final Result       1.  Near complete resolution of left lower  lobe airspace disease with   residual groundglass nodular opacities suggest acute on chronic   pneumonitis. Recommend follow-up to resolution after treatment.   2.  The proximal esophagus is fluid-filled, risk for aspiration.   3.  Redemonstration of mildly prominent but not abnormally dilated   fluid-filled loops of small bowel incompletely imaged suggestive of   enteritis with ileus. Recommend close follow-up and dedicated imaging of   the abdomen if there is concern for developing partial obstruction.   4.  Additional findings as above.       This report was finalized on 4/7/2022 3:21 PM by Dr. Gisela Banda M.D.          XR Chest 1 View   Final Result   1. No definite active disease is seen in the chest. There are low lung   volumes with horizontal linear atelectasis at the lung bases. Recent   chest CT 03/16/2022 demonstrate multifocal small patchy airspace   opacities in the superior segment of the left lower lobe in the   periphery of the left lower lung and left lung base that are not   discernible on this current chest x-ray but may be difficult to   appreciate on standard chest x-ray.   2. 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       This report was finalized on 4/6/2022 1:10 PM by Dr. Robert Talbot M.D.          FL Video Swallow With Speech Single Contrast   Final Result   Fluoroscopy was provided for the speech pathologist during a   video swallow study. For full details please see the speech pathology   report.       This report was finalized on 4/4/2022 3:25 PM by Dr. Marissa Rondon M.D.          FL Esophagram Complete Single Contrast   Final Result          Assessment/Plan     Patient Active Problem List   Diagnosis   • Diabetic ketoacidosis without coma associated with type 2 diabetes mellitus (HCC)   • Pneumonia of left lower lobe due to infectious organism   • Acute UTI   • NSTEMI (non-ST elevated myocardial infarction) ([facility])       Assessment:  1. Dysphagia,  unclear etiology with symptoms of choking and gagging  2. History of pneumonia  3. Intermittent nausea and vomiting  4. Elevated WBC  5. Anticoagulated on Heparin  6. Elevated troponin, cardiology following  7. History of UTI  8. CT chest without contrast showed proximal esophagus is fluid-filled, risk for aspiration.  Redemonstration of mildly prominent but not abnormally dilated fluid-filled loops of small bowel suggestive of enteritis with ileus.  Radiologist made recommendation for close follow-up and dedicated imaging of the abdomen if there is concern for the potential of partial obstruction  9. Negative esophagram on 4/2/2022    These problems are new to me.    Plan:  · Continue to monitor CBC  · She has been evaluated by speech therapy patient with mild to moderate oral dysphagia with slow mastication and diffuse oral residue, no penetration or aspiration observed.  Esophageal scan completed with soft solids which showed slow clearance of lower esophagus requiring liquid wash.    Prior to EGD, patient would need clearance from primary care provider and cardiology and Plavix would need to be held.  Patient is at very high risk for EGD, and given the likely low yield of the procedure, we will defer EGD at this time.  May need to consider PEG placement of feeding tube for nutritional support due to risk of aspiration.  As long as patient has assistance with eating, she is able to do so in a slow fashion.  We will likely to be a conversation held with the family to discuss plan of care moving forward regarding nutritional status and maintenance.    We will continue to follow.    I discussed the patients findings and my recommendations with patient and nursing staff.    Documentation was completed by ROHIT Maradiaga acting as a scribe in the following sections (Assessment, Plan) on behalf of the billing provider. Roosevelt BEE  [facility] [place]  [address]  [place] [address]

## 2022-04-08 NOTE — NURSING NOTE
Upon entering pt room to give 1600 medications there was a noticeable change in patient's status. Pt was not responding to painful stimuli.  Pt's blood sugar checked and manual blood pressure obtained. Pt was hypoglycemic and hypotensive. Rapid Response was called. (See MAR for medications given) Dr. Law paged and notified of pt's status.

## 2022-04-11 LAB
BACTERIA SPEC AEROBE CULT: NORMAL
BACTERIA SPEC AEROBE CULT: NORMAL

## 2022-04-11 NOTE — DISCHARGE SUMMARY
Discharge summary    Date of admission 2022  Date of death 2022    Discussion  89-year-old -American female with multiple medical problem and well-known to our service admitted through emergency room with choking episode unable to eat generalized weakness.  Patient admitted and work extensively with speech pathology gastroenterology and found to have yeast UTI treated with Diflucan.  Patient continued to have choking episode and then she found to have worsening leukocytosis developing sepsis and also increased troponin consistent with acute MI.  Patient deteriorated quickly and .  Patient was DNR and her body released with family.    Cause of death cardiopulmonary failure    VANCE VILLEDA MD

## 2022-04-18 LAB
QT INTERVAL: 364 MS
QT INTERVAL: 405 MS
QT INTERVAL: 405 MS